# Patient Record
Sex: MALE | Race: WHITE | Employment: OTHER | URBAN - METROPOLITAN AREA
[De-identification: names, ages, dates, MRNs, and addresses within clinical notes are randomized per-mention and may not be internally consistent; named-entity substitution may affect disease eponyms.]

---

## 2017-12-29 ENCOUNTER — LAB ENCOUNTER (OUTPATIENT)
Dept: LAB | Age: 62
End: 2017-12-29
Attending: INTERNAL MEDICINE
Payer: COMMERCIAL

## 2017-12-29 DIAGNOSIS — I25.10 CVD (CARDIOVASCULAR DISEASE): Primary | ICD-10-CM

## 2017-12-29 PROCEDURE — 80061 LIPID PANEL: CPT

## 2017-12-29 PROCEDURE — 36415 COLL VENOUS BLD VENIPUNCTURE: CPT

## 2018-01-05 ENCOUNTER — CHARTING TRANS (OUTPATIENT)
Dept: OTHER | Age: 63
End: 2018-01-05

## 2018-03-16 ENCOUNTER — HOSPITAL ENCOUNTER (OUTPATIENT)
Dept: GENERAL RADIOLOGY | Facility: HOSPITAL | Age: 63
Discharge: HOME | End: 2018-03-16
Attending: FAMILY MEDICINE
Payer: COMMERCIAL

## 2018-03-16 DIAGNOSIS — J20.9 ACUTE BRONCHITIS, UNSPECIFIED: ICD-10-CM

## 2018-03-16 DIAGNOSIS — J20.9 ACUTE BRONCHITIS: ICD-10-CM

## 2018-03-16 PROCEDURE — 71046 X-RAY EXAM CHEST 2 VIEWS: CPT | Performed by: FAMILY MEDICINE

## 2018-04-30 ENCOUNTER — LAB ENCOUNTER (OUTPATIENT)
Dept: LAB | Age: 63
End: 2018-04-30
Attending: INTERNAL MEDICINE
Payer: COMMERCIAL

## 2018-04-30 DIAGNOSIS — I25.10 CORONARY ATHEROSCLEROSIS OF NATIVE CORONARY ARTERY: Primary | ICD-10-CM

## 2018-04-30 PROCEDURE — 80061 LIPID PANEL: CPT

## 2018-04-30 PROCEDURE — 36415 COLL VENOUS BLD VENIPUNCTURE: CPT

## 2018-10-13 ENCOUNTER — HOSPITAL ENCOUNTER (EMERGENCY)
Facility: HOSPITAL | Age: 63
Discharge: HOME | End: 2018-10-13
Attending: EMERGENCY MEDICINE
Payer: COMMERCIAL

## 2018-10-13 VITALS
HEIGHT: 71 IN | DIASTOLIC BLOOD PRESSURE: 80 MMHG | TEMPERATURE: 97 F | HEART RATE: 73 BPM | RESPIRATION RATE: 15 BRPM | BODY MASS INDEX: 29.12 KG/M2 | SYSTOLIC BLOOD PRESSURE: 134 MMHG | OXYGEN SATURATION: 94 % | WEIGHT: 208 LBS

## 2018-10-13 DIAGNOSIS — T78.40XA ALLERGIC REACTION, INITIAL ENCOUNTER: Primary | ICD-10-CM

## 2018-10-13 PROCEDURE — 96372 THER/PROPH/DIAG INJ SC/IM: CPT

## 2018-10-13 PROCEDURE — 99284 EMERGENCY DEPT VISIT MOD MDM: CPT

## 2018-10-13 RX ORDER — EPINEPHRINE 0.3 MG/.3ML
0.3 INJECTION SUBCUTANEOUS
Qty: 1 EACH | Refills: 0 | Status: SHIPPED | OUTPATIENT
Start: 2018-10-13 | End: 2018-11-12

## 2018-10-13 RX ORDER — DEXAMETHASONE SODIUM PHOSPHATE 4 MG/ML
10 VIAL (ML) INJECTION ONCE
Status: COMPLETED | OUTPATIENT
Start: 2018-10-13 | End: 2018-10-13

## 2018-10-13 RX ORDER — DIPHENHYDRAMINE HYDROCHLORIDE 50 MG/ML
25 INJECTION INTRAMUSCULAR; INTRAVENOUS ONCE
Status: COMPLETED | OUTPATIENT
Start: 2018-10-13 | End: 2018-10-13

## 2018-10-13 RX ORDER — ONDANSETRON 4 MG/1
4 TABLET, ORALLY DISINTEGRATING ORAL ONCE
Status: COMPLETED | OUTPATIENT
Start: 2018-10-13 | End: 2018-10-13

## 2018-10-13 RX ORDER — DIPHENHYDRAMINE HCL 25 MG
25 TABLET ORAL EVERY 6 HOURS PRN
Qty: 20 TABLET | Refills: 0 | Status: SHIPPED | OUTPATIENT
Start: 2018-10-13

## 2018-10-13 RX ORDER — PREDNISONE 50 MG/1
50 TABLET ORAL DAILY
Qty: 3 TABLET | Refills: 0 | Status: SHIPPED | OUTPATIENT
Start: 2018-10-13 | End: 2018-10-16

## 2018-10-14 NOTE — ED INITIAL ASSESSMENT (HPI)
Pt allergic to shrimp, had ceviche tonight. Wife tried to give benedryl but patient vomited it up. No wheezing, no SOB, no lip or tongue swelling. Pt states he feels dizzy.

## 2018-10-14 NOTE — ED PROVIDER NOTES
Patient Seen in: [facility] Emergency Department    History   Patient presents with:   Allergic Rxn Allergies (immune)    Stated Complaint:     HPI    66year old male with a history of shrimp allergy complains that he accidentally ingested shrimp th Current:/84   Pulse 76   Temp 97.2 °F (36.2 °C) (Temporal)   Resp 16   Ht 180.3 cm (5' 11\")   Wt 94.3 kg   SpO2 96%   BMI 29.01 kg/m²         Physical Exam   Constitutional: He is oriented to person, place, and time. He is cooperative.   Non-to disintegrating tab 4 mg (4 mg Oral Given 10/13/18 2221)         Procedures: None    Re-Evaluation: 1150p-symptoms resolved, no anaphylaxis developed     10/13/18  2137 10/13/18  2223   BP: 144/73 138/84   Pulse: 82 76   Resp: 18 16   Temp: 97.2 °F (36.2 °C precautions, return instructions, and follow up information were provided. Condition stable upon discharge.  We also recommended that the patient schedule follow up care with a primary care provider as soon as possible to obtain basic health screening inclu

## 2019-09-07 ENCOUNTER — HOSPITAL ENCOUNTER (OUTPATIENT)
Dept: GENERAL RADIOLOGY | Facility: HOSPITAL | Age: 64
Discharge: HOME | End: 2019-09-07
Attending: NURSE PRACTITIONER
Payer: COMMERCIAL

## 2019-09-07 DIAGNOSIS — M54.12 BRACHIAL NEURITIS: ICD-10-CM

## 2019-09-07 PROCEDURE — 72052 X-RAY EXAM NECK SPINE 6/>VWS: CPT | Performed by: NURSE PRACTITIONER

## 2019-09-11 ENCOUNTER — HOSPITAL ENCOUNTER (OUTPATIENT)
Dept: CT IMAGING | Facility: HOSPITAL | Age: 64
Discharge: HOME | End: 2019-09-11
Attending: NURSE PRACTITIONER
Payer: COMMERCIAL

## 2019-09-11 DIAGNOSIS — R51.9 CHRONIC HEADACHES: ICD-10-CM

## 2019-09-11 DIAGNOSIS — G89.29 CHRONIC HEADACHES: ICD-10-CM

## 2019-09-11 PROCEDURE — 70450 CT HEAD/BRAIN W/O DYE: CPT | Performed by: NURSE PRACTITIONER

## 2020-02-22 ENCOUNTER — LAB ENCOUNTER (OUTPATIENT)
Dept: LAB | Facility: HOSPITAL | Age: 65
End: 2020-02-22
Attending: INTERNAL MEDICINE
Payer: COMMERCIAL

## 2020-02-22 DIAGNOSIS — I25.10 CORONARY ATHEROSCLEROSIS OF NATIVE CORONARY ARTERY: Primary | ICD-10-CM

## 2020-02-22 LAB
ANION GAP SERPL CALC-SCNC: 4 MMOL/L (ref 0–18)
BUN BLD-MCNC: 24 MG/DL (ref 7–18)
BUN/CREAT SERPL: 19.4 (ref 10–20)
CALCIUM BLD-MCNC: 9.4 MG/DL (ref 8.5–10.1)
CHLORIDE SERPL-SCNC: 109 MMOL/L (ref 98–112)
CHOLEST SMN-MCNC: 156 MG/DL (ref ?–200)
CO2 SERPL-SCNC: 27 MMOL/L (ref 21–32)
CREAT BLD-MCNC: 1.24 MG/DL (ref 0.7–1.3)
GLUCOSE BLD-MCNC: 299 MG/DL (ref 70–99)
HDLC SERPL-MCNC: 45 MG/DL (ref 40–59)
LDLC SERPL CALC-MCNC: 98 MG/DL (ref ?–100)
NONHDLC SERPL-MCNC: 111 MG/DL (ref ?–130)
OSMOLALITY SERPL CALC.SUM OF ELEC: 305 MOSM/KG (ref 275–295)
PATIENT FASTING Y/N/NP: YES
PATIENT FASTING Y/N/NP: YES
POTASSIUM SERPL-SCNC: 4.3 MMOL/L (ref 3.5–5.1)
SODIUM SERPL-SCNC: 140 MMOL/L (ref 136–145)
TRIGL SERPL-MCNC: 64 MG/DL (ref 30–149)
VLDLC SERPL CALC-MCNC: 13 MG/DL (ref 0–30)

## 2020-02-22 PROCEDURE — 80048 BASIC METABOLIC PNL TOTAL CA: CPT

## 2020-02-22 PROCEDURE — 36415 COLL VENOUS BLD VENIPUNCTURE: CPT

## 2020-02-22 PROCEDURE — 80061 LIPID PANEL: CPT

## 2020-03-16 ENCOUNTER — APPOINTMENT (OUTPATIENT)
Dept: GENERAL RADIOLOGY | Facility: HOSPITAL | Age: 65
End: 2020-03-16
Attending: PHYSICIAN ASSISTANT
Payer: COMMERCIAL

## 2020-03-16 ENCOUNTER — HOSPITAL ENCOUNTER (EMERGENCY)
Facility: HOSPITAL | Age: 65
Discharge: HOME | End: 2020-03-16
Attending: PHYSICIAN ASSISTANT
Payer: COMMERCIAL

## 2020-03-16 VITALS
OXYGEN SATURATION: 97 % | HEART RATE: 57 BPM | BODY MASS INDEX: 29.4 KG/M2 | DIASTOLIC BLOOD PRESSURE: 65 MMHG | TEMPERATURE: 98 F | WEIGHT: 210 LBS | HEIGHT: 71 IN | RESPIRATION RATE: 18 BRPM | SYSTOLIC BLOOD PRESSURE: 140 MMHG

## 2020-03-16 DIAGNOSIS — S51.811A FOREARM LACERATION, RIGHT, INITIAL ENCOUNTER: Primary | ICD-10-CM

## 2020-03-16 PROCEDURE — 99284 EMERGENCY DEPT VISIT MOD MDM: CPT

## 2020-03-16 PROCEDURE — 90471 IMMUNIZATION ADMIN: CPT

## 2020-03-16 PROCEDURE — 12001 RPR S/N/AX/GEN/TRNK 2.5CM/<: CPT

## 2020-03-16 PROCEDURE — 73090 X-RAY EXAM OF FOREARM: CPT | Performed by: PHYSICIAN ASSISTANT

## 2020-03-16 RX ORDER — CLINDAMYCIN HYDROCHLORIDE 300 MG/1
300 CAPSULE ORAL 3 TIMES DAILY
Qty: 15 CAPSULE | Refills: 0 | Status: SHIPPED | OUTPATIENT
Start: 2020-03-16 | End: 2020-03-21

## 2020-03-16 NOTE — ED NOTES
Pt states he was working with ceramic tile when he sustained a laceration to the R forearm. Bleeding controlled at this time. Not UTD with Tetanus.

## 2020-03-16 NOTE — ED INITIAL ASSESSMENT (HPI)
Patient presents to ED with c/o of laceration to right forearm. Patient states that a box of tiles fell on his arm. Bleeding controlled. Tetanus not up to date. Denies other injury.

## 2020-03-16 NOTE — ED PROVIDER NOTES
Patient Seen in: [facility] AND [facility] Emergency Department    History   Patient presents with:  Laceration Abrasion    Stated Complaint:     HPI    HPI:     60year old male who presents with chief complaint of right forearm laceration.   Onset 1 hour prior (5' 11\")   Wt 95.3 kg   SpO2 97%   BMI 29.29 kg/m²         Physical Exam      Constitutional: The patient is cooperative. Appears well-developed and well-nourished. No acute distress. Head: Normocephalic/atraumatic. Neck: The neck is supple.   No mening without epinephrine. 5, 4-0 Nylon sutures placed. Skin well-approximated. Bleeding controlled. Laceration repair was simple. Sterile dressing applied. Patient tolerated procedure well without complication.   MDM     Radiology findings: Xr Forearm (2 V

## 2020-07-28 ENCOUNTER — HOSPITAL ENCOUNTER (EMERGENCY)
Facility: HOSPITAL | Age: 65
Discharge: HOME | End: 2020-07-29
Attending: EMERGENCY MEDICINE
Payer: COMMERCIAL

## 2020-07-28 ENCOUNTER — HOSPITAL ENCOUNTER (OUTPATIENT)
Dept: ULTRASOUND IMAGING | Facility: HOSPITAL | Age: 65
Discharge: HOME | End: 2020-07-28
Attending: FAMILY MEDICINE
Payer: COMMERCIAL

## 2020-07-28 DIAGNOSIS — R10.11 ABDOMINAL PAIN, RUQ: ICD-10-CM

## 2020-07-28 DIAGNOSIS — R10.11 ABDOMINAL PAIN, RIGHT UPPER QUADRANT: Primary | ICD-10-CM

## 2020-07-28 LAB
BASOPHILS # BLD AUTO: 0.07 X10(3) UL (ref 0–0.2)
BASOPHILS NFR BLD AUTO: 0.9 %
DEPRECATED RDW RBC AUTO: 39.1 FL (ref 35.1–46.3)
EOSINOPHIL # BLD AUTO: 0.23 X10(3) UL (ref 0–0.7)
EOSINOPHIL NFR BLD AUTO: 3.1 %
ERYTHROCYTE [DISTWIDTH] IN BLOOD BY AUTOMATED COUNT: 13 % (ref 11–15)
HCT VFR BLD AUTO: 37.8 % (ref 39–53)
HGB BLD-MCNC: 13 G/DL (ref 13–17.5)
IMM GRANULOCYTES # BLD AUTO: 0.02 X10(3) UL (ref 0–1)
IMM GRANULOCYTES NFR BLD: 0.3 %
LYMPHOCYTES # BLD AUTO: 1.87 X10(3) UL (ref 1–4)
LYMPHOCYTES NFR BLD AUTO: 24.8 %
MCH RBC QN AUTO: 28.3 PG (ref 26–34)
MCHC RBC AUTO-ENTMCNC: 34.4 G/DL (ref 31–37)
MCV RBC AUTO: 82.4 FL (ref 80–100)
MONOCYTES # BLD AUTO: 0.63 X10(3) UL (ref 0.1–1)
MONOCYTES NFR BLD AUTO: 8.4 %
NEUTROPHILS # BLD AUTO: 4.71 X10 (3) UL (ref 1.5–7.7)
NEUTROPHILS # BLD AUTO: 4.71 X10(3) UL (ref 1.5–7.7)
NEUTROPHILS NFR BLD AUTO: 62.5 %
PLATELET # BLD AUTO: 198 10(3)UL (ref 150–450)
RBC # BLD AUTO: 4.59 X10(6)UL (ref 4.3–5.7)
WBC # BLD AUTO: 7.5 X10(3) UL (ref 4–11)

## 2020-07-28 PROCEDURE — 96374 THER/PROPH/DIAG INJ IV PUSH: CPT

## 2020-07-28 PROCEDURE — 96375 TX/PRO/DX INJ NEW DRUG ADDON: CPT

## 2020-07-28 PROCEDURE — 99284 EMERGENCY DEPT VISIT MOD MDM: CPT

## 2020-07-28 PROCEDURE — 80076 HEPATIC FUNCTION PANEL: CPT | Performed by: EMERGENCY MEDICINE

## 2020-07-28 PROCEDURE — 76705 ECHO EXAM OF ABDOMEN: CPT | Performed by: FAMILY MEDICINE

## 2020-07-28 PROCEDURE — S0028 INJECTION, FAMOTIDINE, 20 MG: HCPCS | Performed by: EMERGENCY MEDICINE

## 2020-07-28 PROCEDURE — 85025 COMPLETE CBC W/AUTO DIFF WBC: CPT | Performed by: EMERGENCY MEDICINE

## 2020-07-28 PROCEDURE — 83690 ASSAY OF LIPASE: CPT | Performed by: EMERGENCY MEDICINE

## 2020-07-28 PROCEDURE — 80048 BASIC METABOLIC PNL TOTAL CA: CPT | Performed by: EMERGENCY MEDICINE

## 2020-07-28 RX ORDER — FAMOTIDINE 10 MG/ML
20 INJECTION, SOLUTION INTRAVENOUS ONCE
Status: COMPLETED | OUTPATIENT
Start: 2020-07-28 | End: 2020-07-28

## 2020-07-28 RX ORDER — MORPHINE SULFATE 4 MG/ML
4 INJECTION, SOLUTION INTRAMUSCULAR; INTRAVENOUS ONCE
Status: COMPLETED | OUTPATIENT
Start: 2020-07-28 | End: 2020-07-28

## 2020-07-29 ENCOUNTER — APPOINTMENT (OUTPATIENT)
Dept: CT IMAGING | Facility: HOSPITAL | Age: 65
End: 2020-07-29
Attending: EMERGENCY MEDICINE
Payer: COMMERCIAL

## 2020-07-29 VITALS
BODY MASS INDEX: 29.12 KG/M2 | DIASTOLIC BLOOD PRESSURE: 82 MMHG | HEART RATE: 51 BPM | HEIGHT: 71 IN | OXYGEN SATURATION: 98 % | SYSTOLIC BLOOD PRESSURE: 174 MMHG | RESPIRATION RATE: 20 BRPM | WEIGHT: 208 LBS | TEMPERATURE: 99 F

## 2020-07-29 LAB
ALBUMIN SERPL-MCNC: 3.5 G/DL (ref 3.4–5)
ALP LIVER SERPL-CCNC: 47 U/L (ref 45–117)
ALT SERPL-CCNC: 28 U/L (ref 16–61)
ANION GAP SERPL CALC-SCNC: 6 MMOL/L (ref 0–18)
AST SERPL-CCNC: 11 U/L (ref 15–37)
BILIRUB DIRECT SERPL-MCNC: <0.1 MG/DL (ref 0–0.2)
BILIRUB SERPL-MCNC: 0.3 MG/DL (ref 0.1–2)
BUN BLD-MCNC: 26 MG/DL (ref 7–18)
BUN/CREAT SERPL: 17.8 (ref 10–20)
CALCIUM BLD-MCNC: 9.6 MG/DL (ref 8.5–10.1)
CHLORIDE SERPL-SCNC: 105 MMOL/L (ref 98–112)
CO2 SERPL-SCNC: 27 MMOL/L (ref 21–32)
CREAT BLD-MCNC: 1.46 MG/DL (ref 0.7–1.3)
GLUCOSE BLD-MCNC: 353 MG/DL (ref 70–99)
LIPASE SERPL-CCNC: 217 U/L (ref 73–393)
M PROTEIN MFR SERPL ELPH: 7 G/DL (ref 6.4–8.2)
OSMOLALITY SERPL CALC.SUM OF ELEC: 305 MOSM/KG (ref 275–295)
POTASSIUM SERPL-SCNC: 4.3 MMOL/L (ref 3.5–5.1)
SODIUM SERPL-SCNC: 138 MMOL/L (ref 136–145)

## 2020-07-29 PROCEDURE — 74177 CT ABD & PELVIS W/CONTRAST: CPT | Performed by: EMERGENCY MEDICINE

## 2020-07-29 RX ORDER — HYDROCODONE BITARTRATE AND ACETAMINOPHEN 5; 325 MG/1; MG/1
1 TABLET ORAL EVERY 6 HOURS PRN
Qty: 10 TABLET | Refills: 0 | Status: SHIPPED | OUTPATIENT
Start: 2020-07-29

## 2020-07-29 RX ORDER — FAMOTIDINE 20 MG
20 TABLET ORAL 2 TIMES DAILY
Qty: 60 TABLET | Refills: 0 | Status: SHIPPED | OUTPATIENT
Start: 2020-07-29 | End: 2020-08-03

## 2020-07-29 NOTE — ED INITIAL ASSESSMENT (HPI)
Pt c/o RUQ abdominal pain since last Thursday. US of gallbladder done today. Pt unaware of the results. Pt states pain medications are not helping and was instructed PCP to come to ED.

## 2020-07-29 NOTE — ED NOTES
Pt states has been having RUQ abd pain, rt shoulder blade pain and belching since last week. States had an US yesterday. States was having diarrhea on Sunday but no BM since then. Denies n/v, fevers, chest pain or SOB. No tenderness on palpation.  States it

## 2020-07-29 NOTE — ED PROVIDER NOTES
Patient Seen in: [facility] Emergency Department    History   Patient presents with:  Abdomen/Flank Pain      HPI    Patient presents to the ED complaining of right upper quadrant abdominal pain starting 5 days ago.   He states that pain was initia duration of the exam.  Handwashing was performed prior to and after the exam.  Stethoscope and any equipment used during my examination was cleaned with super sani-cloth germicidal wipes following the exam.     Physical Exam   Constitutional: He is oriente -----------         ------                     CBC W/ DIFFERENTIAL[[id number]]          Abnormal            Final result                 Please view results for these tests on the individual orders.          Imaging Results Available and 94.3 kg    Height: 180.3 cm (5' 11\")      *I personally reviewed and interpreted all ED vitals.     Pulse Ox: 98%, Room air, Normal     Monitor Interpretation:   normal sinus rhythm    Differential Diagnosis/ Diagnostic Considerations: Nonspecific abdomina

## 2021-03-04 ENCOUNTER — HOSPITAL ENCOUNTER (OUTPATIENT)
Dept: CT IMAGING | Age: 66
Discharge: HOME | End: 2021-03-04
Attending: OTOLARYNGOLOGY

## 2021-03-04 DIAGNOSIS — J32.9 SINUSITIS: ICD-10-CM

## 2021-03-04 PROCEDURE — 70480 CT ORBIT/EAR/FOSSA W/O DYE: CPT

## 2021-04-16 RX ORDER — IRBESARTAN AND HYDROCHLOROTHIAZIDE 150; 12.5 MG/1; MG/1
1 TABLET, FILM COATED ORAL DAILY
COMMUNITY

## 2021-04-16 RX ORDER — INSULIN ASPART 100 [IU]/ML
INJECTION, SUSPENSION SUBCUTANEOUS 3 TIMES DAILY
COMMUNITY
Start: 2013-05-07

## 2021-04-16 RX ORDER — INSULIN GLARGINE 100 [IU]/ML
INJECTION, SOLUTION SUBCUTANEOUS 2 TIMES DAILY
COMMUNITY

## 2021-04-16 RX ORDER — MELOXICAM 15 MG/1
15 TABLET ORAL DAILY
COMMUNITY

## 2021-04-16 RX ORDER — ESCITALOPRAM OXALATE 10 MG/1
10 TABLET ORAL DAILY
COMMUNITY

## 2021-04-16 RX ORDER — LEVOTHYROXINE SODIUM 0.12 MG/1
125 TABLET ORAL DAILY
COMMUNITY

## 2021-04-16 RX ORDER — OMEPRAZOLE 20 MG/1
20 CAPSULE, DELAYED RELEASE ORAL PRN
COMMUNITY

## 2021-04-16 RX ORDER — FENOFIBRATE 145 MG/1
145 TABLET, COATED ORAL DAILY
COMMUNITY
Start: 2021-02-03

## 2021-04-16 RX ORDER — AMITRIPTYLINE HYDROCHLORIDE 10 MG/1
10 TABLET, FILM COATED ORAL NIGHTLY
COMMUNITY

## 2021-04-16 RX ORDER — CLOTRIMAZOLE AND BETAMETHASONE DIPROPIONATE 10; .64 MG/G; MG/G
1 CREAM TOPICAL PRN
COMMUNITY

## 2021-04-16 RX ORDER — ATORVASTATIN CALCIUM 80 MG/1
80 TABLET, FILM COATED ORAL DAILY
COMMUNITY

## 2021-04-16 ASSESSMENT — ACTIVITIES OF DAILY LIVING (ADL)
HISTORY OF FALLING IN THE LAST YEAR (PRIOR TO ADMISSION): NO
ADL_SCORE: 12
SENSORY_SUPPORT_DEVICES: HEARING AIDS;EYEGLASSES
RECENT_DECLINE_ADL: NO
ADL_SHORT_OF_BREATH: NO
ADL_BEFORE_ADMISSION: INDEPENDENT
NEEDS_ASSIST: NO

## 2021-04-16 ASSESSMENT — COGNITIVE AND FUNCTIONAL STATUS - GENERAL: ARE YOU DEAF OR DO YOU HAVE SERIOUS DIFFICULTY  HEARING: YES

## 2021-04-17 ENCOUNTER — LAB SERVICES (OUTPATIENT)
Dept: LAB | Age: 66
End: 2021-04-17

## 2021-04-17 DIAGNOSIS — Z01.812 PRE-PROCEDURAL LABORATORY EXAMINATION: Primary | ICD-10-CM

## 2021-04-17 LAB
SARS-COV-2 RNA RESP QL NAA+PROBE: NOT DETECTED
SERVICE CMNT-IMP: NORMAL
SERVICE CMNT-IMP: NORMAL

## 2021-04-17 PROCEDURE — U0005 INFEC AGEN DETEC AMPLI PROBE: HCPCS | Performed by: OTOLARYNGOLOGY

## 2021-04-17 PROCEDURE — U0003 INFECTIOUS AGENT DETECTION BY NUCLEIC ACID (DNA OR RNA); SEVERE ACUTE RESPIRATORY SYNDROME CORONAVIRUS 2 (SARS-COV-2) (CORONAVIRUS DISEASE [COVID-19]), AMPLIFIED PROBE TECHNIQUE, MAKING USE OF HIGH THROUGHPUT TECHNOLOGIES AS DESCRIBED BY CMS-2020-01-R: HCPCS | Performed by: OTOLARYNGOLOGY

## 2021-04-19 ENCOUNTER — ANESTHESIA EVENT (OUTPATIENT)
Dept: SURGERY | Age: 66
End: 2021-04-19

## 2021-04-19 ENCOUNTER — HOSPITAL ENCOUNTER (OUTPATIENT)
Age: 66
Discharge: HOME | End: 2021-04-19
Attending: OTOLARYNGOLOGY | Admitting: OTOLARYNGOLOGY

## 2021-04-19 ENCOUNTER — ANESTHESIA (OUTPATIENT)
Dept: SURGERY | Age: 66
End: 2021-04-19

## 2021-04-19 LAB
GLUCOSE BLDC GLUCOMTR-MCNC: 162 MG/DL (ref 70–99)
GLUCOSE BLDC GLUCOMTR-MCNC: 184 MG/DL (ref 70–99)
GLUCOSE BLDC GLUCOMTR-MCNC: 239 MG/DL (ref 70–99)

## 2021-04-19 PROCEDURE — L8690 AUD OSSEO DEV, INT/EXT COMP: HCPCS | Performed by: OTOLARYNGOLOGY

## 2021-04-19 PROCEDURE — 10002800 HB RX 250 W HCPCS: Performed by: ANESTHESIOLOGY

## 2021-04-19 PROCEDURE — 10002805 HB CONTRAST AGENT: Performed by: OTOLARYNGOLOGY

## 2021-04-19 PROCEDURE — 10002801 HB RX 250 W/O HCPCS: Performed by: ANESTHESIOLOGY

## 2021-04-19 PROCEDURE — 13000002 HB ANESTHESIA  GENERAL  S/U + 1ST 15 MIN: Performed by: OTOLARYNGOLOGY

## 2021-04-19 PROCEDURE — 10006027 HB SUPPLY 278: Performed by: OTOLARYNGOLOGY

## 2021-04-19 PROCEDURE — 10002801 HB RX 250 W/O HCPCS: Performed by: OTOLARYNGOLOGY

## 2021-04-19 PROCEDURE — 10002807 HB RX 258: Performed by: ANESTHESIOLOGY

## 2021-04-19 PROCEDURE — 10004452 HB PACU ADDL 30 MINUTES: Performed by: OTOLARYNGOLOGY

## 2021-04-19 PROCEDURE — 13000001 HB PHASE II RECOVERY EA 30 MINUTES: Performed by: OTOLARYNGOLOGY

## 2021-04-19 PROCEDURE — 82962 GLUCOSE BLOOD TEST: CPT

## 2021-04-19 PROCEDURE — 13000003 HB ANESTHESIA  GENERAL EA ADD MINUTE: Performed by: OTOLARYNGOLOGY

## 2021-04-19 PROCEDURE — 13000036 HB COMPLEX  CASE S/U + 1ST 15 MIN: Performed by: OTOLARYNGOLOGY

## 2021-04-19 PROCEDURE — 13000037 HB COMPLEX CASE EACH ADD MINUTE: Performed by: OTOLARYNGOLOGY

## 2021-04-19 PROCEDURE — 10006023 HB SUPPLY 272: Performed by: OTOLARYNGOLOGY

## 2021-04-19 PROCEDURE — 10004451 HB PACU RECOVERY 1ST 30 MINUTES: Performed by: OTOLARYNGOLOGY

## 2021-04-19 PROCEDURE — 10002803 HB RX 637: Performed by: OTOLARYNGOLOGY

## 2021-04-19 DEVICE — IMPLANTABLE DEVICE: Type: IMPLANTABLE DEVICE | Site: HEAD | Status: FUNCTIONAL

## 2021-04-19 DEVICE — IMPLANTABLE DEVICE: Type: IMPLANTABLE DEVICE | Site: EAR | Status: FUNCTIONAL

## 2021-04-19 RX ORDER — SODIUM CHLORIDE, SODIUM LACTATE, POTASSIUM CHLORIDE, CALCIUM CHLORIDE 600; 310; 30; 20 MG/100ML; MG/100ML; MG/100ML; MG/100ML
INJECTION, SOLUTION INTRAVENOUS CONTINUOUS PRN
Status: DISCONTINUED | OUTPATIENT
Start: 2021-04-19 | End: 2021-04-20 | Stop reason: HOSPADM

## 2021-04-19 RX ORDER — LIDOCAINE HYDROCHLORIDE 10 MG/ML
INJECTION, SOLUTION INFILTRATION; PERINEURAL PRN
Status: DISCONTINUED | OUTPATIENT
Start: 2021-04-19 | End: 2021-04-19

## 2021-04-19 RX ORDER — LIDOCAINE HYDROCHLORIDE AND EPINEPHRINE 10; 10 MG/ML; UG/ML
INJECTION, SOLUTION INFILTRATION; PERINEURAL PRN
Status: DISCONTINUED | OUTPATIENT
Start: 2021-04-19 | End: 2021-04-19 | Stop reason: HOSPADM

## 2021-04-19 RX ORDER — EPHEDRINE SULFATE/0.9% NACL/PF 50 MG/10ML
SYRINGE (ML) INTRAVENOUS PRN
Status: DISCONTINUED | OUTPATIENT
Start: 2021-04-19 | End: 2021-04-19

## 2021-04-19 RX ORDER — MAGNESIUM HYDROXIDE 1200 MG/15ML
LIQUID ORAL PRN
Status: DISCONTINUED | OUTPATIENT
Start: 2021-04-19 | End: 2021-04-19 | Stop reason: HOSPADM

## 2021-04-19 RX ORDER — SODIUM CHLORIDE, SODIUM LACTATE, POTASSIUM CHLORIDE, CALCIUM CHLORIDE 600; 310; 30; 20 MG/100ML; MG/100ML; MG/100ML; MG/100ML
INJECTION, SOLUTION INTRAVENOUS CONTINUOUS PRN
Status: DISCONTINUED | OUTPATIENT
Start: 2021-04-19 | End: 2021-04-19

## 2021-04-19 RX ORDER — CLINDAMYCIN PHOSPHATE 900 MG/50ML
INJECTION INTRAVENOUS PRN
Status: DISCONTINUED | OUTPATIENT
Start: 2021-04-19 | End: 2021-04-19

## 2021-04-19 RX ORDER — DEXTROSE AND SODIUM CHLORIDE 5; .45 G/100ML; G/100ML
INJECTION, SOLUTION INTRAVENOUS CONTINUOUS PRN
Status: DISCONTINUED | OUTPATIENT
Start: 2021-04-19 | End: 2021-04-20 | Stop reason: HOSPADM

## 2021-04-19 RX ORDER — ONDANSETRON 2 MG/ML
INJECTION INTRAMUSCULAR; INTRAVENOUS PRN
Status: DISCONTINUED | OUTPATIENT
Start: 2021-04-19 | End: 2021-04-19

## 2021-04-19 RX ORDER — SODIUM CHLORIDE, SODIUM LACTATE, POTASSIUM CHLORIDE, CALCIUM CHLORIDE 600; 310; 30; 20 MG/100ML; MG/100ML; MG/100ML; MG/100ML
INJECTION, SOLUTION INTRAVENOUS CONTINUOUS
Status: DISCONTINUED | OUTPATIENT
Start: 2021-04-19 | End: 2021-04-20 | Stop reason: HOSPADM

## 2021-04-19 RX ORDER — HYDROCODONE BITARTRATE AND ACETAMINOPHEN 5; 325 MG/1; MG/1
1 TABLET ORAL EVERY 4 HOURS PRN
Status: DISCONTINUED | OUTPATIENT
Start: 2021-04-19 | End: 2021-04-20 | Stop reason: HOSPADM

## 2021-04-19 RX ORDER — MIDAZOLAM HYDROCHLORIDE 1 MG/ML
2 INJECTION, SOLUTION INTRAMUSCULAR; INTRAVENOUS
Status: COMPLETED | OUTPATIENT
Start: 2021-04-19 | End: 2021-04-19

## 2021-04-19 RX ORDER — PROPOFOL 10 MG/ML
INJECTION, EMULSION INTRAVENOUS PRN
Status: DISCONTINUED | OUTPATIENT
Start: 2021-04-19 | End: 2021-04-19

## 2021-04-19 RX ORDER — ONDANSETRON 2 MG/ML
4 INJECTION INTRAMUSCULAR; INTRAVENOUS ONCE
Status: DISCONTINUED | OUTPATIENT
Start: 2021-04-19 | End: 2021-04-20 | Stop reason: HOSPADM

## 2021-04-19 RX ORDER — DEXTROSE MONOHYDRATE 25 G/50ML
25 INJECTION, SOLUTION INTRAVENOUS PRN
Status: DISCONTINUED | OUTPATIENT
Start: 2021-04-19 | End: 2021-04-20 | Stop reason: HOSPADM

## 2021-04-19 RX ADMIN — PROPOFOL 160 MG: 10 INJECTION, EMULSION INTRAVENOUS at 12:30

## 2021-04-19 RX ADMIN — MIDAZOLAM HYDROCHLORIDE 2 MG: 1 INJECTION, SOLUTION INTRAMUSCULAR; INTRAVENOUS at 12:21

## 2021-04-19 RX ADMIN — CLINDAMYCIN IN 5 PERCENT DEXTROSE 900 MG: 18 INJECTION, SOLUTION INTRAVENOUS at 12:36

## 2021-04-19 RX ADMIN — LIDOCAINE HYDROCHLORIDE 50 MG: 10 INJECTION, SOLUTION INFILTRATION; PERINEURAL at 12:30

## 2021-04-19 RX ADMIN — FENTANYL CITRATE 25 MCG: 50 INJECTION INTRAMUSCULAR; INTRAVENOUS at 14:08

## 2021-04-19 RX ADMIN — SODIUM CHLORIDE, POTASSIUM CHLORIDE, SODIUM LACTATE AND CALCIUM CHLORIDE: 600; 310; 30; 20 INJECTION, SOLUTION INTRAVENOUS at 12:25

## 2021-04-19 RX ADMIN — INSULIN HUMAN 5 UNITS: 100 INJECTION, SOLUTION PARENTERAL at 11:33

## 2021-04-19 RX ADMIN — HYDROCODONE BITARTRATE AND ACETAMINOPHEN 1 TABLET: 5; 325 TABLET ORAL at 15:31

## 2021-04-19 RX ADMIN — SODIUM CHLORIDE, POTASSIUM CHLORIDE, SODIUM LACTATE AND CALCIUM CHLORIDE: 600; 310; 30; 20 INJECTION, SOLUTION INTRAVENOUS at 11:34

## 2021-04-19 RX ADMIN — ONDANSETRON 4 MG: 2 INJECTION INTRAMUSCULAR; INTRAVENOUS at 13:12

## 2021-04-19 RX ADMIN — EPHEDRINE SULFATE 10 MG: 5 INJECTION INTRAVENOUS at 12:52

## 2021-04-19 SDOH — SOCIAL STABILITY: SOCIAL INSECURITY: RISK FACTORS: BMI> 30 (OBESITY)

## 2021-04-19 ASSESSMENT — PAIN SCALES - GENERAL
PAINLEVEL_OUTOF10: 3
PAINLEVEL_OUTOF10: 0
PAINLEVEL_OUTOF10: 0
PAINLEVEL_OUTOF10: 4
PAINLEVEL_OUTOF10: 3
PAINLEVEL_OUTOF10: 3
PAINLEVEL_OUTOF10: 5
PAINLEVEL_OUTOF10: 4
PAINLEVEL_OUTOF10: 0
PAINLEVEL_OUTOF10: 6

## 2021-04-19 ASSESSMENT — PAIN SCALES - WONG BAKER
WONGBAKER_NUMERICALRESPONSE: 0

## 2021-05-05 ENCOUNTER — HOSPITAL ENCOUNTER (EMERGENCY)
Facility: HOSPITAL | Age: 66
Discharge: HOME | End: 2021-05-05
Attending: EMERGENCY MEDICINE
Payer: COMMERCIAL

## 2021-05-05 VITALS
WEIGHT: 216 LBS | RESPIRATION RATE: 16 BRPM | SYSTOLIC BLOOD PRESSURE: 143 MMHG | BODY MASS INDEX: 30 KG/M2 | DIASTOLIC BLOOD PRESSURE: 72 MMHG | TEMPERATURE: 97 F | HEART RATE: 64 BPM | OXYGEN SATURATION: 97 %

## 2021-05-05 DIAGNOSIS — U07.1 COVID-19: Primary | ICD-10-CM

## 2021-05-05 PROCEDURE — 99284 EMERGENCY DEPT VISIT MOD MDM: CPT

## 2021-05-05 NOTE — ED PROVIDER NOTES
Patient Seen in: [facility] AND [facility] Emergency Department      History   Patient presents with:  Covid    Stated Complaint: covid+    HPI/Subjective:   HPI    80-year-old male presents for evaluation of fatigue.   Patient reports he tested positive for Covi distress. Breath sounds: Normal breath sounds. Abdominal:      General: Bowel sounds are normal.      Palpations: Abdomen is soft. Tenderness: There is no abdominal tenderness. Musculoskeletal:         General: Normal range of motion.       Ce personal items, clean and disinfect “high touch” surfaces, and frequent handwashing) according to CDC guidelines.                  Disposition and Plan     Clinical Impression:  [id number]  (primary encounter diagnosis)     Disposition:  Discharge  5/5/2021

## 2021-05-05 NOTE — ED QUICK NOTES
Pt c/o fatigue, \"sleeping all day\", difficulty eating d/t altered taste, nausea w/o vomiting while eating, and numbness/tingling to bilat hands, worse to R per pt. Denies any s/s to BLE. No facial droop, no slurring of speech, no localized weakness.    Pt

## 2021-05-25 VITALS
WEIGHT: 218.03 LBS | OXYGEN SATURATION: 99 % | RESPIRATION RATE: 17 BRPM | BODY MASS INDEX: 30.52 KG/M2 | SYSTOLIC BLOOD PRESSURE: 142 MMHG | TEMPERATURE: 97.2 F | HEART RATE: 62 BPM | DIASTOLIC BLOOD PRESSURE: 70 MMHG | HEIGHT: 71 IN

## 2021-07-22 ENCOUNTER — LAB REQUISITION (OUTPATIENT)
Dept: LAB | Age: 66
End: 2021-07-22

## 2021-07-22 DIAGNOSIS — L02.11 CUTANEOUS ABSCESS OF NECK: ICD-10-CM

## 2021-07-22 PROCEDURE — 87205 SMEAR GRAM STAIN: CPT | Performed by: CLINICAL MEDICAL LABORATORY

## 2021-07-22 PROCEDURE — 87186 SC STD MICRODIL/AGAR DIL: CPT | Performed by: CLINICAL MEDICAL LABORATORY

## 2021-07-22 PROCEDURE — 87077 CULTURE AEROBIC IDENTIFY: CPT | Performed by: CLINICAL MEDICAL LABORATORY

## 2021-07-22 PROCEDURE — 87070 CULTURE OTHR SPECIMN AEROBIC: CPT | Performed by: CLINICAL MEDICAL LABORATORY

## 2021-07-28 LAB
BACTERIA SPEC AEROBE CULT: ABNORMAL
BACTERIA SPEC AEROBE CULT: ABNORMAL
GRAM STN SPEC: ABNORMAL

## 2022-04-01 ENCOUNTER — DOCUMENTATION (OUTPATIENT)
Dept: SURGERY | Age: 67
End: 2022-04-01

## 2022-07-07 NOTE — ED AVS SNAPSHOT
----- Message from Zania Bush sent at 7/7/2022  2:43 PM CDT -----  Contact: pt [phone]  SB  is requesting a hosp follow up within 7 days from today, pls call patient to schedule.     Jen Connor   MRN: [id number]    Department:  [facility] Emergency Department   Date of Visit:  3/16/2020           Disclosure     Insurance plans vary and the physician(s) referred by the ER may not be covered by your plan.  Please contact you CARE PHYSICIAN AT ONCE OR RETURN IMMEDIATELY TO THE EMERGENCY DEPARTMENT. If you have been prescribed any medication(s), please fill your prescription right away and begin taking the medication(s) as directed.   If you believe that any of the medications
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
## 2022-12-20 ENCOUNTER — CLINICAL DOCUMENTATION (OUTPATIENT)
Dept: SURGERY | Age: 67
End: 2022-12-20

## 2023-04-18 ENCOUNTER — OFFICE VISIT (OUTPATIENT)
Dept: INTERNAL MEDICINE CLINIC | Facility: CLINIC | Age: 68
End: 2023-04-18

## 2023-04-18 VITALS
HEIGHT: 71 IN | HEART RATE: 75 BPM | RESPIRATION RATE: 18 BRPM | WEIGHT: 214 LBS | BODY MASS INDEX: 29.96 KG/M2 | DIASTOLIC BLOOD PRESSURE: 68 MMHG | TEMPERATURE: 98 F | SYSTOLIC BLOOD PRESSURE: 132 MMHG | OXYGEN SATURATION: 98 %

## 2023-04-18 DIAGNOSIS — E11.9 TYPE 2 DIABETES MELLITUS WITHOUT COMPLICATION, WITH LONG-TERM CURRENT USE OF INSULIN (HCC): ICD-10-CM

## 2023-04-18 DIAGNOSIS — Z00.00 PREVENTATIVE HEALTH CARE: ICD-10-CM

## 2023-04-18 DIAGNOSIS — G89.29 CHRONIC NONINTRACTABLE HEADACHE, UNSPECIFIED HEADACHE TYPE: ICD-10-CM

## 2023-04-18 DIAGNOSIS — I25.10 CORONARY ARTERY DISEASE INVOLVING NATIVE CORONARY ARTERY OF NATIVE HEART WITHOUT ANGINA PECTORIS: ICD-10-CM

## 2023-04-18 DIAGNOSIS — Z79.4 TYPE 2 DIABETES MELLITUS WITHOUT COMPLICATION, WITH LONG-TERM CURRENT USE OF INSULIN (HCC): ICD-10-CM

## 2023-04-18 DIAGNOSIS — R00.2 PALPITATIONS: ICD-10-CM

## 2023-04-18 DIAGNOSIS — R51.9 CHRONIC NONINTRACTABLE HEADACHE, UNSPECIFIED HEADACHE TYPE: ICD-10-CM

## 2023-04-18 DIAGNOSIS — F41.9 ANXIETY: ICD-10-CM

## 2023-04-18 DIAGNOSIS — Z76.89 ENCOUNTER TO ESTABLISH CARE: Primary | ICD-10-CM

## 2023-04-18 DIAGNOSIS — R42 DIZZY SPELLS: ICD-10-CM

## 2023-04-18 LAB
ALBUMIN SERPL-MCNC: 3.4 G/DL (ref 3.4–5)
ALBUMIN/GLOB SERPL: 1.1 {RATIO} (ref 1–2)
ALP LIVER SERPL-CCNC: 46 U/L
ALT SERPL-CCNC: 53 U/L
ANION GAP SERPL CALC-SCNC: 8 MMOL/L (ref 0–18)
AST SERPL-CCNC: 30 U/L (ref 15–37)
BASOPHILS # BLD AUTO: 0.06 X10(3) UL (ref 0–0.2)
BASOPHILS NFR BLD AUTO: 0.7 %
BILIRUB SERPL-MCNC: 0.2 MG/DL (ref 0.1–2)
BUN BLD-MCNC: 30 MG/DL (ref 7–18)
BUN/CREAT SERPL: 24.4 (ref 10–20)
CALCIUM BLD-MCNC: 9 MG/DL (ref 8.5–10.1)
CHLORIDE SERPL-SCNC: 107 MMOL/L (ref 98–112)
CO2 SERPL-SCNC: 23 MMOL/L (ref 21–32)
CREAT BLD-MCNC: 1.23 MG/DL
DEPRECATED RDW RBC AUTO: 40.2 FL (ref 35.1–46.3)
EOSINOPHIL # BLD AUTO: 0.28 X10(3) UL (ref 0–0.7)
EOSINOPHIL NFR BLD AUTO: 3.3 %
ERYTHROCYTE [DISTWIDTH] IN BLOOD BY AUTOMATED COUNT: 13 % (ref 11–15)
FASTING STATUS PATIENT QL REPORTED: YES
GFR SERPLBLD BASED ON 1.73 SQ M-ARVRAT: 65 ML/MIN/1.73M2 (ref 60–?)
GLOBULIN PLAS-MCNC: 3 G/DL (ref 2.8–4.4)
GLUCOSE BLD-MCNC: 347 MG/DL (ref 70–99)
HCT VFR BLD AUTO: 36.7 %
HGB BLD-MCNC: 12.2 G/DL
IMM GRANULOCYTES # BLD AUTO: 0.03 X10(3) UL (ref 0–1)
IMM GRANULOCYTES NFR BLD: 0.4 %
LYMPHOCYTES # BLD AUTO: 1.71 X10(3) UL (ref 1–4)
LYMPHOCYTES NFR BLD AUTO: 20 %
MCH RBC QN AUTO: 28.6 PG (ref 26–34)
MCHC RBC AUTO-ENTMCNC: 33.2 G/DL (ref 31–37)
MCV RBC AUTO: 85.9 FL
MONOCYTES # BLD AUTO: 0.65 X10(3) UL (ref 0.1–1)
MONOCYTES NFR BLD AUTO: 7.6 %
NEUTROPHILS # BLD AUTO: 5.82 X10 (3) UL (ref 1.5–7.7)
NEUTROPHILS # BLD AUTO: 5.82 X10(3) UL (ref 1.5–7.7)
NEUTROPHILS NFR BLD AUTO: 68 %
OSMOLALITY SERPL CALC.SUM OF ELEC: 306 MOSM/KG (ref 275–295)
PLATELET # BLD AUTO: 182 10(3)UL (ref 150–450)
POTASSIUM SERPL-SCNC: 5.3 MMOL/L (ref 3.5–5.1)
PROT SERPL-MCNC: 6.4 G/DL (ref 6.4–8.2)
RBC # BLD AUTO: 4.27 X10(6)UL
SODIUM SERPL-SCNC: 138 MMOL/L (ref 136–145)
WBC # BLD AUTO: 8.6 X10(3) UL (ref 4–11)

## 2023-04-18 PROCEDURE — 99204 OFFICE O/P NEW MOD 45 MIN: CPT | Performed by: INTERNAL MEDICINE

## 2023-04-18 PROCEDURE — 90677 PCV20 VACCINE IM: CPT | Performed by: INTERNAL MEDICINE

## 2023-04-18 PROCEDURE — G0009 ADMIN PNEUMOCOCCAL VACCINE: HCPCS | Performed by: INTERNAL MEDICINE

## 2023-04-18 PROCEDURE — 36415 COLL VENOUS BLD VENIPUNCTURE: CPT | Performed by: INTERNAL MEDICINE

## 2023-04-18 RX ORDER — FUROSEMIDE 40 MG/1
40 TABLET ORAL 2 TIMES DAILY
COMMUNITY

## 2023-04-18 RX ORDER — LOSARTAN POTASSIUM 25 MG/1
1 TABLET ORAL DAILY
COMMUNITY

## 2023-04-18 RX ORDER — ROSUVASTATIN CALCIUM 20 MG/1
20 TABLET, COATED ORAL NIGHTLY
COMMUNITY

## 2023-04-18 RX ORDER — LIRAGLUTIDE 6 MG/ML
1.2 INJECTION SUBCUTANEOUS DAILY
COMMUNITY

## 2023-04-18 RX ORDER — ISOSORBIDE MONONITRATE 30 MG/1
30 TABLET, EXTENDED RELEASE ORAL DAILY
COMMUNITY

## 2023-04-18 RX ORDER — ASPIRIN 81 MG/1
81 TABLET ORAL DAILY
COMMUNITY

## 2023-04-18 RX ORDER — CHOLECALCIFEROL (VITAMIN D3) 1250 MCG
1 CAPSULE ORAL WEEKLY
COMMUNITY

## 2023-04-18 RX ORDER — LEVOTHYROXINE SODIUM 0.12 MG/1
125 TABLET ORAL
COMMUNITY

## 2023-04-18 RX ORDER — AMLODIPINE BESYLATE 10 MG/1
10 TABLET ORAL DAILY
COMMUNITY

## 2023-04-18 RX ORDER — INSULIN GLARGINE 100 [IU]/ML
60 INJECTION, SOLUTION SUBCUTANEOUS NIGHTLY
COMMUNITY

## 2023-04-18 RX ORDER — EZETIMIBE 10 MG/1
10 TABLET ORAL NIGHTLY
COMMUNITY

## 2023-04-19 LAB
EST. AVERAGE GLUCOSE BLD GHB EST-MCNC: 232 MG/DL (ref 68–126)
HBA1C MFR BLD: 9.7 % (ref ?–5.7)

## 2023-04-20 ENCOUNTER — HOSPITAL ENCOUNTER (OUTPATIENT)
Dept: CV DIAGNOSTICS | Facility: HOSPITAL | Age: 68
Discharge: HOME | End: 2023-04-20
Attending: INTERNAL MEDICINE
Payer: MEDICARE

## 2023-04-20 DIAGNOSIS — R42 DIZZY SPELLS: ICD-10-CM

## 2023-04-20 DIAGNOSIS — R00.2 PALPITATIONS: ICD-10-CM

## 2023-04-20 PROCEDURE — 93225 XTRNL ECG REC<48 HRS REC: CPT | Performed by: INTERNAL MEDICINE

## 2023-04-28 ENCOUNTER — HOSPITAL ENCOUNTER (OUTPATIENT)
Dept: CT IMAGING | Facility: HOSPITAL | Age: 68
Discharge: HOME | End: 2023-04-28
Attending: INTERNAL MEDICINE
Payer: MEDICARE

## 2023-04-28 DIAGNOSIS — R51.9 CHRONIC NONINTRACTABLE HEADACHE, UNSPECIFIED HEADACHE TYPE: ICD-10-CM

## 2023-04-28 DIAGNOSIS — G89.29 CHRONIC NONINTRACTABLE HEADACHE, UNSPECIFIED HEADACHE TYPE: ICD-10-CM

## 2023-04-28 DIAGNOSIS — R42 DIZZY SPELLS: ICD-10-CM

## 2023-04-28 PROCEDURE — 70450 CT HEAD/BRAIN W/O DYE: CPT | Performed by: INTERNAL MEDICINE

## 2023-05-04 ENCOUNTER — OFFICE VISIT (OUTPATIENT)
Dept: INTERNAL MEDICINE CLINIC | Facility: CLINIC | Age: 68
End: 2023-05-04

## 2023-05-04 VITALS
HEART RATE: 76 BPM | BODY MASS INDEX: 29.96 KG/M2 | TEMPERATURE: 98 F | DIASTOLIC BLOOD PRESSURE: 78 MMHG | HEIGHT: 71 IN | RESPIRATION RATE: 18 BRPM | WEIGHT: 214 LBS | OXYGEN SATURATION: 98 % | SYSTOLIC BLOOD PRESSURE: 138 MMHG

## 2023-05-04 DIAGNOSIS — E11.65 TYPE 2 DIABETES MELLITUS WITH HYPERGLYCEMIA, WITH LONG-TERM CURRENT USE OF INSULIN (HCC): Primary | ICD-10-CM

## 2023-05-04 DIAGNOSIS — E87.5 HYPERKALEMIA: ICD-10-CM

## 2023-05-04 DIAGNOSIS — Z79.4 TYPE 2 DIABETES MELLITUS WITH HYPERGLYCEMIA, WITH LONG-TERM CURRENT USE OF INSULIN (HCC): Primary | ICD-10-CM

## 2023-05-04 LAB — POTASSIUM SERPL-SCNC: 4.4 MMOL/L (ref 3.5–5.1)

## 2023-05-04 PROCEDURE — 36415 COLL VENOUS BLD VENIPUNCTURE: CPT | Performed by: INTERNAL MEDICINE

## 2023-05-04 PROCEDURE — 99214 OFFICE O/P EST MOD 30 MIN: CPT | Performed by: INTERNAL MEDICINE

## 2023-05-09 ENCOUNTER — HOSPITAL ENCOUNTER (OUTPATIENT)
Dept: ULTRASOUND IMAGING | Facility: HOSPITAL | Age: 68
Discharge: HOME | End: 2023-05-09
Attending: INTERNAL MEDICINE
Payer: MEDICARE

## 2023-05-09 DIAGNOSIS — R42 DIZZY SPELLS: ICD-10-CM

## 2023-05-09 PROCEDURE — 93880 EXTRACRANIAL BILAT STUDY: CPT | Performed by: INTERNAL MEDICINE

## 2023-05-11 RX ORDER — ISOSORBIDE MONONITRATE 30 MG/1
30 TABLET, EXTENDED RELEASE ORAL DAILY
Qty: 90 TABLET | Refills: 3 | Status: SHIPPED | OUTPATIENT
Start: 2023-05-11

## 2023-05-11 RX ORDER — LEVOTHYROXINE SODIUM 0.12 MG/1
125 TABLET ORAL
Qty: 90 TABLET | Refills: 3 | Status: SHIPPED | OUTPATIENT
Start: 2023-05-11

## 2023-05-11 RX ORDER — AMLODIPINE BESYLATE 10 MG/1
10 TABLET ORAL DAILY
Qty: 90 TABLET | Refills: 3 | Status: SHIPPED | OUTPATIENT
Start: 2023-05-11

## 2023-05-11 RX ORDER — LOSARTAN POTASSIUM 25 MG/1
25 TABLET ORAL DAILY
Qty: 90 TABLET | Refills: 3 | Status: SHIPPED | OUTPATIENT
Start: 2023-05-11

## 2023-05-11 RX ORDER — ROSUVASTATIN CALCIUM 20 MG/1
20 TABLET, COATED ORAL NIGHTLY
Qty: 90 TABLET | Refills: 3 | Status: SHIPPED | OUTPATIENT
Start: 2023-05-11

## 2023-05-11 RX ORDER — ASPIRIN 81 MG/1
81 TABLET ORAL DAILY
Qty: 90 TABLET | Refills: 3 | Status: SHIPPED | OUTPATIENT
Start: 2023-05-11

## 2023-05-11 RX ORDER — INSULIN GLARGINE 100 [IU]/ML
60 INJECTION, SOLUTION SUBCUTANEOUS NIGHTLY
Qty: 10 ML | Refills: 5 | Status: SHIPPED | OUTPATIENT
Start: 2023-05-11

## 2023-05-11 RX ORDER — EZETIMIBE 10 MG/1
10 TABLET ORAL NIGHTLY
Qty: 90 TABLET | Refills: 3 | Status: SHIPPED | OUTPATIENT
Start: 2023-05-11

## 2023-05-11 RX ORDER — FUROSEMIDE 40 MG/1
40 TABLET ORAL 2 TIMES DAILY
Qty: 90 TABLET | Refills: 3 | Status: SHIPPED | OUTPATIENT
Start: 2023-05-11

## 2023-05-11 NOTE — TELEPHONE ENCOUNTER
Please review. Protocol Failed or has No Protocol. All requested medications listed as external. Please advise. Thanks. Requested Prescriptions   Pending Prescriptions Disp Refills    metFORMIN HCl 1000 MG Oral Tab 180 tablet 1     Sig: Take 1 tablet (1,000 mg total) by mouth 2 (two) times daily with meals. Diabetes Medication Protocol Failed - 5/11/2023 11:26 AM        Failed - Last A1C < 7.5 and within past 6 months     Lab Results   Component Value Date    A1C 9.7 (H) 04/18/2023               Passed - In person appointment or virtual visit in the past 6 mos or appointment in next 3 mos     Recent Outpatient Visits              1 week ago Type 2 diabetes mellitus with hyperglycemia, with long-term current use of insulin [facility]    Mitchell Pascal MD    Office Visit    3 weeks ago Encounter to establish care    Mitchell Pascal MD    Office Visit    2 years ago Other constipation    Gastroenterology - Katelyn Sutton MD    Office Visit          Future Appointments         Provider Department Appt Notes    In 3 weeks Kath Malhotra MD [address] [facility],[address], [address] [facility] never had px    In 3 months MILY [facility] [address], [place] CLN screen, wife made appt, policy informed               Passed - EGFRCR or GFRNAA > 50     GFR Evaluation  EGFRCR: 65 , resulted on 4/18/2023            Passed - GFR in the past 12 months          insulin glargine 100 UNIT/ML Subcutaneous Solution 10 mL 5     Sig: Inject 60 Units into the skin nightly.        Diabetes Medication Protocol Failed - 5/11/2023 11:26 AM        Failed - Last A1C < 7.5 and within past 6 months     Lab Results   Component Value Date    A1C 9.7 (H) 04/18/2023               Passed - In person appointment or virtual visit in the past 6 mos or appointment in next 3 mos Recent Outpatient Visits              1 week ago Type 2 diabetes mellitus with hyperglycemia, with long-term current use of insulin (HCC)    [address]    Office Visit    3 weeks ago Encounter to establish care    [address]    Office Visit    2 years ago Other constipation    Gastroenterology - Maicol Briones MD    Office Visit          Future Appointments         Provider Department Appt Notes    In 3 weeks Marissa Jenkins MD [address], [address] [facility] never had px    In 3 months [facility], [place] [facility] screen, wife made appt, policy informed               Passed - EGFRCR or GFRNAA > 50     GFR Evaluation  EGFRCR: 65 , resulted on 4/18/2023            Passed - GFR in the past 12 months          levothyroxine 125 MCG Oral Tab 90 tablet 1     Sig: Take 1 tablet (125 mcg total) by mouth before breakfast.       Thyroid Medication Protocol Failed - 5/11/2023 11:26 AM        Failed - TSH in past 12 months        Failed - Last TSH value is normal     No results found for: TSH, THYROIDFUNC, TSHT4              Passed - In person appointment or virtual visit in the past 12 mos or appointment in next 3 mos     Recent Outpatient Visits              1 week ago Type 2 diabetes mellitus with hyperglycemia, with long-term current use of insulin ([facility]    [address]    Office Visit    3 weeks ago Encounter to establish care    [address]    Office Visit    2 years ago Other constipation    Gastroenterology - Maicol Briones MD    Office Visit          Future Appointments         Provider Department Appt Notes    In 3 weeks MD [facility], [address] [facility] never had px    In 3 months MILY Ayoub, [address], [facility], wife made appt, policy informed                 rosuvastatin 20 MG Oral Tab 90 tablet 1     Sig: Take 1 tablet (20 mg total) by mouth nightly. Cholesterol Medication Protocol Failed - 5/11/2023 11:26 AM        Failed - LDL in past 12 months        Failed - Last LDL < 130     Lab Results   Component Value Date    LDL 98 02/22/2020               Passed - ALT in past 12 months        Passed - Last ALT < 80     Lab Results   Component Value Date    ALT 53 04/18/2023             Passed - In person appointment or virtual visit in the past 12 mos or appointment in next 3 mos     Recent Outpatient Visits              1 week ago Type 2 diabetes mellitus with hyperglycemia, with long-term current use of insulin ([address]    Talisha Larson MD    Office Visit    3 weeks ago Encounter to establish care    Talisha Larson MD    Office Visit    2 years ago Other constipation    [facility]    Office Visit          Future Appointments         Provider Department Appt Notes    In 3 weeks MD Tabatha Cedillo, [address] [facility] never had px    In 3 months MILY [facility] [place] [address], [place] CLN screen, wife made appt, policy informed                 aspirin 81 MG Oral Tab EC 90 tablet 1     Sig: Take 1 tablet (81 mg total) by mouth daily.        Aspirin Protocol Passed - 5/11/2023 11:26 AM        Passed - In person appointment or virtual visit in the past 6 mos or appointment in next 3 mos     Recent Outpatient Visits              1 week ago Type 2 diabetes mellitus with hyperglycemia, with long-term current use of insulin ([address]    Kel Meg Kasandra Garcia MD    Office Visit    3 weeks ago Encounter to establish care    Kasandra Baker MD    Office Visit    2 years ago Other constipation    Gastroenterology - Jalyn Blas MD    Office Visit          Future Appointments         Provider Department Appt Notes    In 3 weeks Trupti Childs MD Deyanira Love, [address] never had px    In 3 months MILY Campos [facility], [address], [place] CLN screen, wife made appt, policy informed                 isosorbide mononitrate ER 30 MG Oral Tablet 24 Hr 90 tablet 1     Sig: Take 1 tablet (30 mg total) by mouth daily. There is no refill protocol information for this order       ezetimibe 10 MG Oral Tab 90 tablet 1     Sig: Take 1 tablet (10 mg total) by mouth nightly.        Cholesterol Medication Protocol Failed - 5/11/2023 11:26 AM        Failed - LDL in past 12 months        Failed - Last LDL < 130     Lab Results   Component Value Date    LDL 98 02/22/2020               Passed - ALT in past 12 months        Passed - Last ALT < 80     Lab Results   Component Value Date    ALT 53 04/18/2023             Passed - In person appointment or virtual visit in the past 12 mos or appointment in next 3 mos     Recent Outpatient Visits              1 week ago Type 2 diabetes mellitus with hyperglycemia, with long-term current use of insulin ([address]    Kasandra Baker MD    Office Visit    3 weeks ago Encounter to establish care    Kasandra Baker MD    Office Visit    2 years ago Other constipation    Gastroenterology - Jalyn Blas MD    Office Visit          Future Appointments         Provider Department Appt Notes    In 3 weeks Trupti Childs MD [address] [facility] never had px    In 3 months Adalid Zabala, APRN Luz Hutchinson, [address], [place] CLN screen, wife made appt, policy informed                 amLODIPine 10 MG Oral Tab 90 tablet 1     Sig: Take 1 tablet (10 mg total) by mouth daily.        Hypertensive Medications Protocol Passed - 5/11/2023 11:26 AM        Passed - In person appointment in the past 12 or next 3 months     Recent Outpatient Visits              1 week ago Type 2 diabetes mellitus with hyperglycemia, with long-term current use of insulin ([facility]    Luz Hutchinson, [address], Keisha Alvarez MD    Office Visit    3 weeks ago Encounter to [address], Keisha Alvarez MD    Office Visit    2 years ago Other constipation    Gastroenterology - Jeanna Ashley, Levi Raymundo MD    Office Visit          Future Appointments         Provider Department Appt Notes    In 3 weeks MD Luz Wallace, [address], [facility] never had px    In 3 months MILY [facility] [facility] [address], [place] CLN screen, wife made appt, policy informed               Passed - Last BP reading less than 140/90     BP Readings from Last 1 Encounters:  05/04/23 : 138/78                Passed - CMP or BMP in past 6 months     Recent Results (from the past 4392 hour(s))   COMP METABOLIC PANEL (14)    Collection Time: 04/18/23  2:26 PM   Result Value Ref Range    Glucose 347 (H) 70 - 99 mg/dL    Sodium 138 136 - 145 mmol/L    Potassium 5.3 (H) 3.5 - 5.1 mmol/L    Chloride 107 98 - 112 mmol/L    CO2 23.0 21.0 - 32.0 mmol/L    Anion Gap 8 0 - 18 mmol/L    BUN 30 (H) 7 - 18 mg/dL    Creatinine 1.23 0.70 - 1.30 mg/dL    BUN/CREA Ratio 24.4 (H) 10.0 - 20.0    Calcium, Total 9.0 8.5 - 10.1 mg/dL    Calculated Osmolality 306 (H) 275 - 295 mOsm/kg    eGFR-Cr 65 >=60 mL/min/1.73m2    ALT 53 16 - 61 U/L    AST 30 15 - 37 U/L    Alkaline Phosphatase 46 45 - 117 U/L    Bilirubin, Total 0.2 0.1 - 2.0 mg/dL    Total Protein 6.4 6.4 - 8.2 g/dL    Albumin 3.4 3.4 - 5.0 g/dL    Globulin  3.0 2.8 - 4.4 g/dL    A/G Ratio 1.1 1.0 - 2.0    Patient Fasting for CMP? Yes      *Note: Due to a large number of results and/or encounters for the requested time period, some results have not been displayed. A complete set of results can be found in Results Review. Passed - In person appointment or virtual visit in the past 6 months     Recent Outpatient Visits              1 week ago Type 2 diabetes mellitus with hyperglycemia, with long-term current use of insulin ([facility])    Keri Azul MD    Office Visit    3 weeks ago Encounter to establish care    Keri Azul MD    Office Visit    2 years ago Other constipation    Gastroenterology - [address], Tavo Barrios MD    Office Visit          Future Appointments         Provider Department Appt Notes    In 3 weeks MD [facility], [address] never had px    In 3 months [facility] [address], [place] screen, wife made appt, policy informed               Passed - EGFRCR or GFRNAA > 50     GFR Evaluation  EGFRCR: 65 , resulted on 4/18/2023              losartan 25 MG Oral Tab 90 tablet 1     Sig: Take 1 tablet (25 mg total) by mouth daily.        Hypertensive Medications Protocol Passed - 5/11/2023 11:26 AM        Passed - In person appointment in the past 12 or next 3 months     Recent Outpatient Visits              1 week ago Type 2 diabetes mellitus with hyperglycemia, with long-term current use of insulin [facility])    Keri Azul MD    Office Visit    3 weeks ago Encounter to establish care    [address] [facility], David Pinto MD    Office Visit    2 years ago Other constipation    Gastroenterology - Vira Lui MD    Office Visit          Future Appointments         Provider Department Appt Notes    In 3 weeks MD [facility], [address], [address] never had px    In 3 months Alonna Flight, APRN [address], [address], [place] CLN screen, wife made appt, policy informed               Passed - Last BP reading less than 140/90     BP Readings from Last 1 Encounters:  05/04/23 : 138/78                Passed - CMP or BMP in past 6 months     Recent Results (from the past 4392 hour(s))   COMP METABOLIC PANEL (14)    Collection Time: 04/18/23  2:26 PM   Result Value Ref Range    Glucose 347 (H) 70 - 99 mg/dL    Sodium 138 136 - 145 mmol/L    Potassium 5.3 (H) 3.5 - 5.1 mmol/L    Chloride 107 98 - 112 mmol/L    CO2 23.0 21.0 - 32.0 mmol/L    Anion Gap 8 0 - 18 mmol/L    BUN 30 (H) 7 - 18 mg/dL    Creatinine 1.23 0.70 - 1.30 mg/dL    BUN/CREA Ratio 24.4 (H) 10.0 - 20.0    Calcium, Total 9.0 8.5 - 10.1 mg/dL    Calculated Osmolality 306 (H) 275 - 295 mOsm/kg    eGFR-Cr 65 >=60 mL/min/1.73m2    ALT 53 16 - 61 U/L    AST 30 15 - 37 U/L    Alkaline Phosphatase 46 45 - 117 U/L    Bilirubin, Total 0.2 0.1 - 2.0 mg/dL    Total Protein 6.4 6.4 - 8.2 g/dL    Albumin 3.4 3.4 - 5.0 g/dL    Globulin  3.0 2.8 - 4.4 g/dL    A/G Ratio 1.1 1.0 - 2.0    Patient Fasting for CMP? Yes      *Note: Due to a large number of results and/or encounters for the requested time period, some results have not been displayed. A complete set of results can be found in Results Review.                  Passed - In person appointment or virtual visit in the past 6 months     Recent Outpatient Visits              1 week ago Type 2 diabetes mellitus with hyperglycemia, with long-term current use of insulin ([facility]    [address]    Office Visit    3 weeks ago Encounter to establish care    [address]    Office Visit    2 years ago Other constipation    Gastroenterology - Edward Loop, Juan Baptiste MD    Office Visit          Future Appointments         Provider Department Appt Notes    In 3 weeks Maye Felipe MD [address], [address] never had px    In 3 months [facility], [address], [facility], wife made appt, policy informed               Passed - EGFRCR or GFRNAA > 50     GFR Evaluation  EGFRCR: 65 , resulted on 4/18/2023              furosemide 40 MG Oral Tab 90 tablet 1     Sig: Take 1 tablet (40 mg total) by mouth 2 (two) times daily.        Hypertensive Medications Protocol Passed - 5/11/2023 11:26 AM        Passed - In person appointment in the past 12 or next 3 months     Recent Outpatient Visits              1 week ago Type 2 diabetes mellitus with hyperglycemia, with long-term current use of insulin ([address]    [address] MD    Office Visit    3 weeks ago Encounter to establish care    [address] Makayla Dickinson MD    Office Visit    2 years ago Other constipation    Gastroenterology - Edward Rodriguez, Juan Baptiste MD    Office Visit          Future Appointments         Provider Department Appt Notes    In 3 weeks Maye Felipe MD [address], [address] never had px    In 3 months MILY Villatoro [address], [address], [place] CLN screen, wife made appt, policy informed               Passed - Last BP reading less than 140/90     BP Readings from Last 1 Encounters:  05/04/23 : 138/78                Passed - CMP or BMP in past 6 months     Recent Results (from the past 4392 hour(s))   COMP METABOLIC PANEL (14)    Collection Time: 04/18/23  2:26 PM   Result Value Ref Range    Glucose 347 (H) 70 - 99 mg/dL    Sodium 138 136 - 145 mmol/L    Potassium 5.3 (H) 3.5 - 5.1 mmol/L    Chloride 107 98 - 112 mmol/L    CO2 23.0 21.0 - 32.0 mmol/L    Anion Gap 8 0 - 18 mmol/L    BUN 30 (H) 7 - 18 mg/dL    Creatinine 1.23 0.70 - 1.30 mg/dL    BUN/CREA Ratio 24.4 (H) 10.0 - 20.0    Calcium, Total 9.0 8.5 - 10.1 mg/dL    Calculated Osmolality 306 (H) 275 - 295 mOsm/kg    eGFR-Cr 65 >=60 mL/min/1.73m2    ALT 53 16 - 61 U/L    AST 30 15 - 37 U/L    Alkaline Phosphatase 46 45 - 117 U/L    Bilirubin, Total 0.2 0.1 - 2.0 mg/dL    Total Protein 6.4 6.4 - 8.2 g/dL    Albumin 3.4 3.4 - 5.0 g/dL    Globulin  3.0 2.8 - 4.4 g/dL    A/G Ratio 1.1 1.0 - 2.0    Patient Fasting for CMP? Yes      *Note: Due to a large number of results and/or encounters for the requested time period, some results have not been displayed. A complete set of results can be found in Results Review.                  Passed - In person appointment or virtual visit in the past 6 months     Recent Outpatient Visits              1 week ago Type 2 diabetes mellitus with hyperglycemia, with long-term current use of insulin ([facility] [address]    Breanna Lott MD    Office Visit    3 weeks ago Encounter to establish care    Breanna Lott MD    Office Visit    2 years ago Other constipation    Gastroenterology - [address] Rob Stone MD    Office Visit          Future Appointments         Provider Department Appt Notes    In 3 weeks MD Alfonso Damon, [address] never had px    In 3 months MILY [place] [address], [place] CLN screen, wife made appt, policy informed               Passed - [facility] or GFRNAA > 50     GFR Evaluation  EGFRCR: 65 , resulted on 4/18/2023                 Recent Outpatient Visits              1 week ago Type 2 diabetes mellitus with hyperglycemia, with long-term current use of insulin ([facility])    [address], [address], Jovani Verdin MD    Office Visit    3 weeks ago Encounter to establish care    [address] [facility], Jovani Verdin MD    Office Visit    2 years ago Other constipation    Gastroenterology - Ashish Landrum MD    Office Visit            Future Appointments         Provider Department Appt Notes    In 3 weeks Uma Kennedy MD [address], [address] [facility] never had px    In 3 months Lisa Postal, APRN [address], [place] CLN screen, wife made appt, policy informed

## 2023-05-11 NOTE — TELEPHONE ENCOUNTER
Spouse stated that patient has new insurance and needs a refill on all his medications. States pharmacy stated that patient needs prior authorization for the humalog. Chucky transferred patient to phone room to update insurance information.

## 2023-08-23 ENCOUNTER — TELEPHONE (OUTPATIENT)
Dept: INTERNAL MEDICINE CLINIC | Facility: CLINIC | Age: 68
End: 2023-08-23

## 2023-12-26 ENCOUNTER — HOSPITAL ENCOUNTER (EMERGENCY)
Facility: HOSPITAL | Age: 68
Discharge: HOME | End: 2023-12-27
Attending: EMERGENCY MEDICINE
Payer: MEDICAID

## 2023-12-26 DIAGNOSIS — L03.811 CELLULITIS OF HEAD EXCEPT FACE: Primary | ICD-10-CM

## 2023-12-26 PROCEDURE — 10060 I&D ABSCESS SIMPLE/SINGLE: CPT

## 2023-12-26 PROCEDURE — 99284 EMERGENCY DEPT VISIT MOD MDM: CPT

## 2023-12-26 PROCEDURE — 36415 COLL VENOUS BLD VENIPUNCTURE: CPT

## 2023-12-26 RX ORDER — LIDOCAINE HYDROCHLORIDE 10 MG/ML
20 INJECTION, SOLUTION EPIDURAL; INFILTRATION; INTRACAUDAL; PERINEURAL ONCE
Status: COMPLETED | OUTPATIENT
Start: 2023-12-26 | End: 2023-12-26

## 2023-12-26 RX ORDER — CIPROFLOXACIN 500 MG/1
500 TABLET, FILM COATED ORAL 2 TIMES DAILY
Qty: 20 TABLET | Refills: 0 | Status: SHIPPED | OUTPATIENT
Start: 2023-12-26 | End: 2024-01-05

## 2023-12-26 RX ORDER — CLINDAMYCIN HYDROCHLORIDE 300 MG/1
300 CAPSULE ORAL 3 TIMES DAILY
Qty: 30 CAPSULE | Refills: 0 | Status: SHIPPED | OUTPATIENT
Start: 2023-12-26 | End: 2024-01-05

## 2023-12-26 RX ORDER — HYDROCODONE BITARTRATE AND ACETAMINOPHEN 5; 325 MG/1; MG/1
1 TABLET ORAL EVERY 6 HOURS PRN
Qty: 15 TABLET | Refills: 0 | Status: SHIPPED | OUTPATIENT
Start: 2023-12-26

## 2023-12-26 RX ORDER — HYDROCODONE BITARTRATE AND ACETAMINOPHEN 5; 325 MG/1; MG/1
1 TABLET ORAL ONCE
Status: COMPLETED | OUTPATIENT
Start: 2023-12-26 | End: 2023-12-26

## 2023-12-27 VITALS
DIASTOLIC BLOOD PRESSURE: 82 MMHG | SYSTOLIC BLOOD PRESSURE: 156 MMHG | WEIGHT: 212 LBS | OXYGEN SATURATION: 98 % | HEART RATE: 62 BPM | BODY MASS INDEX: 29.68 KG/M2 | HEIGHT: 71 IN | TEMPERATURE: 98 F | RESPIRATION RATE: 16 BRPM

## 2023-12-27 PROCEDURE — 87070 CULTURE OTHR SPECIMN AEROBIC: CPT | Performed by: EMERGENCY MEDICINE

## 2023-12-27 PROCEDURE — 87077 CULTURE AEROBIC IDENTIFY: CPT | Performed by: EMERGENCY MEDICINE

## 2023-12-27 PROCEDURE — 87205 SMEAR GRAM STAIN: CPT | Performed by: EMERGENCY MEDICINE

## 2023-12-30 ENCOUNTER — HOSPITAL ENCOUNTER (INPATIENT)
Facility: HOSPITAL | Age: 68
LOS: 6 days | Discharge: HOME | End: 2024-01-05
Attending: EMERGENCY MEDICINE | Admitting: INTERNAL MEDICINE

## 2023-12-30 ENCOUNTER — APPOINTMENT (OUTPATIENT)
Dept: CT IMAGING | Facility: HOSPITAL | Age: 68
End: 2023-12-30
Attending: EMERGENCY MEDICINE

## 2023-12-30 DIAGNOSIS — Z96.21 COCHLEAR IMPLANT IN PLACE: ICD-10-CM

## 2023-12-30 DIAGNOSIS — L03.811 CELLULITIS OF HEAD OR SCALP: Primary | ICD-10-CM

## 2023-12-30 PROBLEM — E87.1 HYPONATREMIA: Status: ACTIVE | Noted: 2023-12-30

## 2023-12-30 PROBLEM — E87.5 HYPERKALEMIA: Status: ACTIVE | Noted: 2023-12-30

## 2023-12-30 PROBLEM — R73.9 HYPERGLYCEMIA: Status: ACTIVE | Noted: 2023-12-30

## 2023-12-30 PROBLEM — R79.89 AZOTEMIA: Status: ACTIVE | Noted: 2023-12-30

## 2023-12-30 LAB
ANION GAP SERPL CALC-SCNC: 2 MMOL/L (ref 0–18)
BASOPHILS # BLD AUTO: 0.06 X10(3) UL (ref 0–0.2)
BASOPHILS NFR BLD AUTO: 0.6 %
BILIRUB UR QL: NEGATIVE
BUN BLD-MCNC: 32 MG/DL (ref 9–23)
BUN/CREAT SERPL: 21.8 (ref 10–20)
CALCIUM BLD-MCNC: 9.3 MG/DL (ref 8.7–10.4)
CHLORIDE SERPL-SCNC: 104 MMOL/L (ref 98–112)
CLARITY UR: CLEAR
CO2 SERPL-SCNC: 26 MMOL/L (ref 21–32)
COLOR UR: YELLOW
CREAT BLD-MCNC: 1.47 MG/DL
DEPRECATED RDW RBC AUTO: 39.9 FL (ref 35.1–46.3)
EGFRCR SERPLBLD CKD-EPI 2021: 52 ML/MIN/1.73M2 (ref 60–?)
EOSINOPHIL # BLD AUTO: 0.35 X10(3) UL (ref 0–0.7)
EOSINOPHIL NFR BLD AUTO: 3.5 %
ERYTHROCYTE [DISTWIDTH] IN BLOOD BY AUTOMATED COUNT: 13.4 % (ref 11–15)
GLUCOSE BLD-MCNC: 350 MG/DL (ref 70–99)
GLUCOSE BLDC GLUCOMTR-MCNC: 263 MG/DL (ref 70–99)
GLUCOSE BLDC GLUCOMTR-MCNC: 312 MG/DL (ref 70–99)
GLUCOSE UR-MCNC: 300 MG/DL
HCT VFR BLD AUTO: 40.7 %
HGB BLD-MCNC: 13.8 G/DL
HGB UR QL STRIP.AUTO: NEGATIVE
IMM GRANULOCYTES # BLD AUTO: 0.04 X10(3) UL (ref 0–1)
IMM GRANULOCYTES NFR BLD: 0.4 %
KETONES UR-MCNC: NEGATIVE MG/DL
LACTATE SERPL-SCNC: 1.9 MMOL/L (ref 0.5–2)
LEUKOCYTE ESTERASE UR QL STRIP.AUTO: NEGATIVE
LYMPHOCYTES # BLD AUTO: 1.67 X10(3) UL (ref 1–4)
LYMPHOCYTES NFR BLD AUTO: 16.9 %
MCH RBC QN AUTO: 28 PG (ref 26–34)
MCHC RBC AUTO-ENTMCNC: 33.9 G/DL (ref 31–37)
MCV RBC AUTO: 82.6 FL
MONOCYTES # BLD AUTO: 0.78 X10(3) UL (ref 0.1–1)
MONOCYTES NFR BLD AUTO: 7.9 %
NEUTROPHILS # BLD AUTO: 7 X10 (3) UL (ref 1.5–7.7)
NEUTROPHILS # BLD AUTO: 7 X10(3) UL (ref 1.5–7.7)
NEUTROPHILS NFR BLD AUTO: 70.7 %
NITRITE UR QL STRIP.AUTO: NEGATIVE
OSMOLALITY SERPL CALC.SUM OF ELEC: 295 MOSM/KG (ref 275–295)
PH UR: 6 [PH] (ref 5–8)
PLATELET # BLD AUTO: 191 10(3)UL (ref 150–450)
POTASSIUM SERPL-SCNC: 5.2 MMOL/L (ref 3.5–5.1)
PROT UR-MCNC: 300 MG/DL
RBC # BLD AUTO: 4.93 X10(6)UL
SODIUM SERPL-SCNC: 132 MMOL/L (ref 136–145)
SP GR UR STRIP: 1.02 (ref 1–1.03)
UROBILINOGEN UR STRIP-ACNC: NORMAL
WBC # BLD AUTO: 9.9 X10(3) UL (ref 4–11)

## 2023-12-30 PROCEDURE — 70450 CT HEAD/BRAIN W/O DYE: CPT | Performed by: EMERGENCY MEDICINE

## 2023-12-30 RX ORDER — NICOTINE POLACRILEX 4 MG
30 LOZENGE BUCCAL
Status: DISCONTINUED | OUTPATIENT
Start: 2023-12-30 | End: 2024-01-05

## 2023-12-30 RX ORDER — DEXTROSE MONOHYDRATE 25 G/50ML
50 INJECTION, SOLUTION INTRAVENOUS
Status: DISCONTINUED | OUTPATIENT
Start: 2023-12-30 | End: 2024-01-05

## 2023-12-30 RX ORDER — VANCOMYCIN HYDROCHLORIDE
15 EVERY 24 HOURS
Status: DISCONTINUED | OUTPATIENT
Start: 2023-12-31 | End: 2024-01-02

## 2023-12-30 RX ORDER — ISOSORBIDE MONONITRATE 30 MG/1
30 TABLET, EXTENDED RELEASE ORAL DAILY
Status: DISCONTINUED | OUTPATIENT
Start: 2023-12-30 | End: 2024-01-05

## 2023-12-30 RX ORDER — HEPARIN SODIUM 5000 [USP'U]/ML
5000 INJECTION, SOLUTION INTRAVENOUS; SUBCUTANEOUS EVERY 8 HOURS SCHEDULED
Status: DISCONTINUED | OUTPATIENT
Start: 2023-12-30 | End: 2024-01-05

## 2023-12-30 RX ORDER — LOSARTAN POTASSIUM 25 MG/1
25 TABLET ORAL DAILY
Status: DISCONTINUED | OUTPATIENT
Start: 2023-12-30 | End: 2024-01-05

## 2023-12-30 RX ORDER — ASPIRIN 81 MG/1
81 TABLET ORAL DAILY
Status: DISCONTINUED | OUTPATIENT
Start: 2023-12-30 | End: 2024-01-05

## 2023-12-30 RX ORDER — HYDROCODONE BITARTRATE AND ACETAMINOPHEN 5; 325 MG/1; MG/1
1 TABLET ORAL EVERY 6 HOURS PRN
Status: DISCONTINUED | OUTPATIENT
Start: 2023-12-30 | End: 2024-01-03

## 2023-12-30 RX ORDER — EZETIMIBE 10 MG/1
10 TABLET ORAL NIGHTLY
Status: DISCONTINUED | OUTPATIENT
Start: 2023-12-30 | End: 2024-01-05

## 2023-12-30 RX ORDER — NICOTINE POLACRILEX 4 MG
15 LOZENGE BUCCAL
Status: DISCONTINUED | OUTPATIENT
Start: 2023-12-30 | End: 2024-01-05

## 2023-12-30 RX ORDER — AMLODIPINE BESYLATE 10 MG/1
10 TABLET ORAL DAILY
Status: DISCONTINUED | OUTPATIENT
Start: 2023-12-30 | End: 2024-01-05

## 2023-12-30 RX ORDER — ROSUVASTATIN CALCIUM 20 MG/1
20 TABLET, COATED ORAL NIGHTLY
Status: DISCONTINUED | OUTPATIENT
Start: 2023-12-30 | End: 2024-01-05

## 2023-12-30 RX ORDER — FUROSEMIDE 40 MG/1
40 TABLET ORAL 2 TIMES DAILY
Status: DISCONTINUED | OUTPATIENT
Start: 2023-12-30 | End: 2024-01-05

## 2023-12-30 RX ORDER — VANCOMYCIN HYDROCHLORIDE
15 ONCE
Status: COMPLETED | OUTPATIENT
Start: 2023-12-30 | End: 2023-12-30

## 2023-12-30 RX ORDER — LEVOTHYROXINE SODIUM 0.12 MG/1
125 TABLET ORAL
Status: DISCONTINUED | OUTPATIENT
Start: 2023-12-31 | End: 2024-01-05

## 2023-12-30 NOTE — ED INITIAL ASSESSMENT (HPI)
Patient complains of pain, swelling, and drainage to site of left cochlear implant, associated with headache. Was seen here Thursday, was irrigated, and placed on abx. Here for worsening symptoms.

## 2023-12-31 LAB
BASOPHILS # BLD AUTO: 0.05 X10(3) UL (ref 0–0.2)
BASOPHILS NFR BLD AUTO: 0.7 %
DEPRECATED RDW RBC AUTO: 38.6 FL (ref 35.1–46.3)
EOSINOPHIL # BLD AUTO: 0.37 X10(3) UL (ref 0–0.7)
EOSINOPHIL NFR BLD AUTO: 4.8 %
ERYTHROCYTE [DISTWIDTH] IN BLOOD BY AUTOMATED COUNT: 13.1 % (ref 11–15)
EST. AVERAGE GLUCOSE BLD GHB EST-MCNC: 232 MG/DL (ref 68–126)
GLUCOSE BLDC GLUCOMTR-MCNC: 139 MG/DL (ref 70–99)
GLUCOSE BLDC GLUCOMTR-MCNC: 175 MG/DL (ref 70–99)
GLUCOSE BLDC GLUCOMTR-MCNC: 313 MG/DL (ref 70–99)
GLUCOSE BLDC GLUCOMTR-MCNC: 329 MG/DL (ref 70–99)
HBA1C MFR BLD: 9.7 % (ref ?–5.7)
HCT VFR BLD AUTO: 34.4 %
HGB BLD-MCNC: 11.8 G/DL
IMM GRANULOCYTES # BLD AUTO: 0.02 X10(3) UL (ref 0–1)
IMM GRANULOCYTES NFR BLD: 0.3 %
LYMPHOCYTES # BLD AUTO: 1.61 X10(3) UL (ref 1–4)
LYMPHOCYTES NFR BLD AUTO: 20.9 %
MCH RBC QN AUTO: 28 PG (ref 26–34)
MCHC RBC AUTO-ENTMCNC: 34.3 G/DL (ref 31–37)
MCV RBC AUTO: 81.5 FL
MONOCYTES # BLD AUTO: 0.76 X10(3) UL (ref 0.1–1)
MONOCYTES NFR BLD AUTO: 9.9 %
NEUTROPHILS # BLD AUTO: 4.88 X10 (3) UL (ref 1.5–7.7)
NEUTROPHILS # BLD AUTO: 4.88 X10(3) UL (ref 1.5–7.7)
NEUTROPHILS NFR BLD AUTO: 63.4 %
PLATELET # BLD AUTO: 176 10(3)UL (ref 150–450)
RBC # BLD AUTO: 4.22 X10(6)UL
WBC # BLD AUTO: 7.7 X10(3) UL (ref 4–11)

## 2023-12-31 PROCEDURE — 11042 DBRDMT SUBQ TIS 1ST 20SQCM/<: CPT | Performed by: STUDENT IN AN ORGANIZED HEALTH CARE EDUCATION/TRAINING PROGRAM

## 2023-12-31 PROCEDURE — 99223 1ST HOSP IP/OBS HIGH 75: CPT | Performed by: STUDENT IN AN ORGANIZED HEALTH CARE EDUCATION/TRAINING PROGRAM

## 2023-12-31 PROCEDURE — 99222 1ST HOSP IP/OBS MODERATE 55: CPT | Performed by: INTERNAL MEDICINE

## 2023-12-31 RX ORDER — LIDOCAINE HYDROCHLORIDE AND EPINEPHRINE 10; 10 MG/ML; UG/ML
10 INJECTION, SOLUTION INFILTRATION; PERINEURAL ONCE
Qty: 20 ML | Refills: 0 | Status: COMPLETED | OUTPATIENT
Start: 2023-12-31 | End: 2023-12-31

## 2023-12-31 RX ORDER — LIDOCAINE HYDROCHLORIDE AND EPINEPHRINE 10; 10 MG/ML; UG/ML
10 INJECTION, SOLUTION INFILTRATION; PERINEURAL ONCE
Status: DISCONTINUED | OUTPATIENT
Start: 2023-12-31 | End: 2023-12-31

## 2023-12-31 NOTE — PROGRESS NOTES
[facility] Note:  Renal Adjustment for cefepime (MAXIPIME)    Samy Hoskins is a 68 year old patient who has been prescribed cefepime (MAXIPIME) 1000 mg every 12 hrs.  The estimated creatinine clearance is 52.6 mL/min (A) (based on SCr of 1.47 mg/dL (H)). The dose has been adjusted to cefepime (MAXIPIME) 1000 mg every 8 hrs per hospital renal dose adjustment protocol for treatment of cellulitis.  Pharmacy will follow and adjust dose as warranted for additional renal function changes.    Thank you,    Zayra Sung, PharmD  12/30/2023  10:45 PM

## 2023-12-31 NOTE — ED PROVIDER NOTES
Patient Seen in: [facility] Emergency Department      History     Chief Complaint   Patient presents with    Abscess     Stated Complaint: Abscess    Subjective:   HPI    Patient presents emergency department with increasing pain, drainage and swelling around the site of a cochlear implant on his left scalp.  He was seen 2 days ago, had some debridement performed and he was started on 2 different antibiotics but he states that he feels worse today than he did then with increasing pain, some mild dizziness and nausea.  There is no neck pain or neck stiffness.  There is no other aggravating relieving factors.  He has history of diabetes and is unsure of how his sugars been running.    Objective:   Past Medical History:   Diagnosis Date    Atherosclerosis of coronary artery     Deaf, right     Diabetes (HCC)     Essential hypertension     Hyperlipidemia     Thyroid disease               Past Surgical History:   Procedure Laterality Date    CORONARY STENT PLACEMENT      ROTATOR CUFF REPAIR Left                 Social History     Socioeconomic History    Marital status:    Tobacco Use    Smoking status: Former     Passive exposure: Never    Smokeless tobacco: Never   Vaping Use    Vaping Use: Never used   Substance and Sexual Activity    Alcohol use: Not Currently    Drug use: Never     Social Determinants of Health     Food Insecurity: No Food Insecurity (12/30/2023)    Food Insecurity     Food Insecurity: Never true   Transportation Needs: No Transportation Needs (12/30/2023)    Transportation Needs     Lack of Transportation: No   Housing Stability: Low Risk  (12/30/2023)    Housing Stability     Housing Instability: No              Review of Systems    Positive for stated complaint: Abscess  Other systems are as noted in HPI.  Constitutional and vital signs reviewed.      All other systems reviewed and negative except as noted above.    Physical Exam     ED Triage Vitals [12/30/23 1313]   /71    Pulse 63   Resp 18   Temp 97.8 °F (36.6 °C)   Temp src Temporal   SpO2 98 %   O2 Device None (Room air)       Current:/74   Pulse 58   Temp 97.8 °F (36.6 °C) (Temporal)   Resp 18   Wt 96.2 kg   SpO2 97%   BMI 29.58 kg/m²         Physical Exam  Vitals and nursing note reviewed.   Constitutional:       General: He is not in acute distress.     Appearance: He is well-developed.   HENT:      Head: Normocephalic.      Nose: Nose normal.      Mouth/Throat:      Mouth: Mucous membranes are moist.   Eyes:      Conjunctiva/sclera: Conjunctivae normal.   Cardiovascular:      Rate and Rhythm: Normal rate and regular rhythm.      Heart sounds: No murmur heard.  Pulmonary:      Effort: Pulmonary effort is normal. No respiratory distress.      Breath sounds: Normal breath sounds.   Abdominal:      General: There is no distension.      Palpations: Abdomen is soft.      Tenderness: There is no abdominal tenderness.   Musculoskeletal:         General: No tenderness. Normal range of motion.      Cervical back: Normal range of motion and neck supple.   Skin:     Comments: On the left parietal scalp there is granulation tissue as well as cellulitic changes which are tender to the touch surrounding the cochlear implant post.  There is no fluctuance.  There is mild drainage.   Neurological:      Mental Status: He is alert and oriented to person, place, and time.               ED Course     Labs Reviewed   BASIC METABOLIC PANEL (8) - Abnormal; Notable for the following components:       Result Value    Glucose 350 (*)     Sodium 132 (*)     Potassium 5.2 (*)     BUN 32 (*)     Creatinine 1.47 (*)     BUN/CREA Ratio 21.8 (*)     eGFR-Cr 52 (*)     All other components within normal limits   URINALYSIS, ROUTINE - Abnormal; Notable for the following components:    Glucose Urine 300 (*)     Protein Urine 300 (*)     All other components within normal limits   POCT GLUCOSE - Abnormal; Notable for the following components:    POC  Glucose  312 (*)     All other components within normal limits   POCT GLUCOSE - Abnormal; Notable for the following components:    POC Glucose  263 (*)     All other components within normal limits   LACTIC ACID, PLASMA - Normal   CBC WITH DIFFERENTIAL WITH PLATELET    Narrative:     The following orders were created for panel order CBC With Differential With Platelet.  Procedure                               Abnormality         Status                     ---------                               -----------         ------                     CBC W/ DIFFERENTIAL[[id number]]                              Final result                 Please view results for these tests on the individual orders.   AEROBIC BACTERIAL CULTURE   BLOOD CULTURE   BLOOD CULTURE   CBC W/ DIFFERENTIAL                      [facility]        Admission disposition: 12/30/2023  7:07 PM                       Medical Decision Making  Differential diagnosis considered for abscess, cellulitis, foreign body reaction.    Problems Addressed:  Cellulitis of head or scalp: acute illness or injury  Cochlear implant in place: acute illness or injury    Amount and/or Complexity of Data Reviewed  Labs: ordered. Decision-making details documented in ED Course.     Details: Laboratory studies show elevated blood sugar.  Radiology: ordered and independent interpretation performed. Decision-making details documented in ED Course.     Details: CT scan shows cellulitis but no obvious abscess  Discussion of management or test interpretation with external provider(s): This case was discussed with the covering physician for the patient and requiring admission for IV antibiotics as well as tighter blood sugar control.  I spoke with infectious disease who recommended Vanco and cefepime.  I also spoke with ENT, Dr. Nguyen who reviewed the CT scan and stated that this is not actually a true cochlear implant that is more of a bolt screwed into the skull to assist with hearing.  He  states that eventually this will likely need to be removed but he would see the patient in consultation.    Risk  Prescription drug management.  Drug therapy requiring intensive monitoring for toxicity.  Decision regarding hospitalization.        Disposition and Plan     Clinical Impression:  1. Cellulitis of head or scalp    2. Cochlear implant in place         Disposition:  Admit  12/30/2023  7:07 pm    Follow-up:  No follow-up provider specified.  We recommend that you schedule follow up care with a primary care provider within the next three months to obtain basic health screening including reassessment of your blood pressure.      Medications Prescribed:  Current Discharge Medication List                            Hospital Problems       Present on Admission  Date Reviewed: 5/4/2023            ICD-10-CM Noted POA    * (Principal) Cellulitis of head or scalp L03.811 12/30/2023 Unknown    Azotemia R79.89 12/30/2023 Yes    Cochlear implant in place Z96.21 12/30/2023 Unknown    Hyperglycemia R73.9 12/30/2023 Yes    Hyperkalemia E87.5 12/30/2023 Yes    Hyponatremia E87.1 12/30/2023 Yes

## 2023-12-31 NOTE — ED QUICK NOTES
Transport at bedside. Patient aware of plan of care, verbalizes understanding. Brought to floor with belongings. Family at bedside.

## 2023-12-31 NOTE — CONSULTS
[facility]    Report of Consultation    Samy Hoskins Patient Status:  Inpatient    1955 MRN [id number]   Location [facility] 5SW/SE Attending Leatha Marie MD   Hosp Day # 1 PCP Kaleb Marie MD     Date of Admission:  2023  Date of Consult:  2023  Reason for Consultation:   Scalp infection    History of Present Illness:   Patient is a 68 year old male who was admitted to the hospital for    A left sided scalp infection.  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  He had it placed about 2 to 3 years ago by Dr. Chris at [facility].  He has been dealing with this infection for 3 weeks now.  His glucose is not controlled it is in the 300s.  He was seen several days ago in the emergency room where an I&D was performed of this wound site cultures were taken but they did not grow anything.  He failed outpatient antibiotics and is now admitted for IV therapy.  He reports exquisite tenderness around the implant site of his left scalp.  CT scan did not demonstrate any abscess just soft tissue thickening.    Past Medical History  Past Medical History:   Diagnosis Date    Atherosclerosis of coronary artery     Deaf, right     Diabetes (HCC)     Essential hypertension     Hyperlipidemia     Thyroid disease        Past Surgical History  Past Surgical History:   Procedure Laterality Date    CORONARY STENT PLACEMENT      ROTATOR CUFF REPAIR Left        Family History  History reviewed. No pertinent family history.    Social History  Pediatric History   Patient Parents    Not on file     Other Topics Concern    Not on file   Social History Narrative    Not on file           Current Medications:  Current Facility-Administered Medications   Medication Dose Route Frequency    ceFEPIme (Maxipime) 1 g in sodium chloride 0.9% 100 mL IVPB-MBP  1 g Intravenous Q8H    insulin aspart (NovoLOG) 100 Units/mL FlexPen 18 Units  18 Units Subcutaneous TID CC    insulin detemir  (Levemir) 100 UNIT/ML FlexPen/FlexTouch 55 Units  55 Units Subcutaneous Nightly    vancomycin (Vancocin) 1.5 g in sodium chloride 0.9% 250mL IVPB premix  15 mg/kg Intravenous Q24H    glucose (Dex4) 15 GM/59ML oral liquid 15 g  15 g Oral Q15 Min PRN    Or    glucose (Glutose) 40% oral gel 15 g  15 g Oral Q15 Min PRN    Or    glucose-vitamin C (Dex-4) chewable tab 4 tablet  4 tablet Oral Q15 Min PRN    Or    dextrose 50% injection 50 mL  50 mL Intravenous Q15 Min PRN    Or    glucose (Dex4) 15 GM/59ML oral liquid 30 g  30 g Oral Q15 Min PRN    Or    glucose (Glutose) 40% oral gel 30 g  30 g Oral Q15 Min PRN    Or    glucose-vitamin C (Dex-4) chewable tab 8 tablet  8 tablet Oral Q15 Min PRN    insulin aspart (NovoLOG) 100 Units/mL FlexPen 1-5 Units  1-5 Units Subcutaneous TID CC    amLODIPine (Norvasc) tab 10 mg  10 mg Oral Daily    aspirin DR tab 81 mg  81 mg Oral Daily    ezetimibe (Zetia) tab 10 mg  10 mg Oral Nightly    furosemide (Lasix) tab 40 mg  40 mg Oral BID    HYDROcodone-acetaminophen (Norco) 5-325 MG per tab 1 tablet  1 tablet Oral Q6H PRN    isosorbide mononitrate ER (Imdur) 24 hr tab 30 mg  30 mg Oral Daily    levothyroxine (Synthroid) tab 125 mcg  125 mcg Oral Before breakfast    losartan (Cozaar) tab 25 mg  25 mg Oral Daily    rosuvastatin (Crestor) tab 20 mg  20 mg Oral Nightly    heparin (Porcine) 5000 UNIT/ML injection 5,000 Units  5,000 Units Subcutaneous Q8H REJI    influenza vaccine high dose quad (Fluzone QIV HD) 0.7 mL IM injection (ages >/= 65 years) 0.7 mL  0.7 mL Intramuscular Prior to discharge     Medications Prior to Admission   Medication Sig    ciprofloxacin 500 MG Oral Tab Take 1 tablet (500 mg total) by mouth 2 (two) times daily for 10 days.    clindamycin 300 MG Oral Cap Take 1 capsule (300 mg total) by mouth 3 (three) times daily for 10 days.    HYDROcodone-acetaminophen 5-325 MG Oral Tab Take 1 tablet by mouth every 6 (six) hours as needed for Pain.    metFORMIN HCl 1000 MG Oral Tab  Take 1 tablet (1,000 mg total) by mouth 2 (two) times daily with meals.    levothyroxine 125 MCG Oral Tab Take 1 tablet (125 mcg total) by mouth before breakfast.    rosuvastatin 20 MG Oral Tab Take 1 tablet (20 mg total) by mouth nightly.    aspirin 81 MG Oral Tab EC Take 1 tablet (81 mg total) by mouth daily.    isosorbide mononitrate ER 30 MG Oral Tablet 24 Hr Take 1 tablet (30 mg total) by mouth daily.    ezetimibe 10 MG Oral Tab Take 1 tablet (10 mg total) by mouth nightly.    amLODIPine 10 MG Oral Tab Take 1 tablet (10 mg total) by mouth daily.    losartan 25 MG Oral Tab Take 1 tablet (25 mg total) by mouth daily.    furosemide 40 MG Oral Tab Take 1 tablet (40 mg total) by mouth 2 (two) times daily.    Cholecalciferol (VITAMIN D3) 1.25 MG (14030 UT) Oral Cap Take 1 capsule by mouth once a week.    insulin glargine 100 UNIT/ML Subcutaneous Solution Inject 60 Units into the skin nightly.    insulin lispro (HUMALOG) 100 UNIT/ML Subcutaneous Solution Inject 20 Units into the skin in the morning, at noon, and at bedtime.       Allergies  Allergies   Allergen Reactions    Penicillins SWELLING    Shrimp OTHER (SEE COMMENTS)     Throat tightness       Review of Systems:   Pertinent items are noted in HPI.    Physical Exam:   Blood pressure 123/70, pulse 57, temperature 98.1 °F (36.7 °C), temperature source Oral, resp. rate 18, weight 212 lb 1.3 oz (96.2 kg), SpO2 96%.         Constitutional Normal Overall appearance -resting comfortably in bed   Psychiatric Normal Orientation - Oriented to time, place, person & situation. Appropriate mood and affect.   Neck Exam Normal Inspection - Normal. Palpation - Normal. Parotid gland - Normal. Thyroid gland - Normal.   Eyes Normal Conjunctiva - Right: Normal, Left: Normal. Pupil - Right: Normal, Left: Normal.     Neurological Normal Memory - Normal. Cranial nerves - Cranial nerves II through XII grossly intact.   Head/Face Normal Facial features - Normal. Eyebrows - Normal.    Posterior to the left ear is the bone-anchored hearing aid bolt.  Surrounding this is indurated and erythematous and tender skin.  At the superior anterior margin there was crusting and a small amount of mucoid purulence with an opening under the skin possibly from the previous incision site.  Mild overlying cellulitis.  Small rim of granulation tissue around the Bolz.  I debrided this crusting and mucus and sent this for culture.  I placed Betadine soaked iodoform gauze under the skin circumferentially around the bolt.  There is no abscess or fluctuance of the area.        Nose/Nasopharynx mouth throat Normal External nose - Normal. Lips/teeth/gums - Normal. Tonsils - Normal. Oropharynx - Normal.   Ears Normal Inspection - Right: Normal, Left: Normal. Canal - Right: Normal, Left: Normal. TM - Right: Normal, Left: Normal.   Skin Normal Inspection - Normal.        Lymph Detail Normal Submental. Submandibular. Anterior cervical. Posterior cervical. Supraclavicular.     Procedure  Debridement of left scalp wound  Consent obtained.  Local anesthetic infiltrated around the bolt.  Wound debrided with forceps and scissors.  Sent for culture.  Quarter inch iodoform gauze soaked in Betadine placed circumferentially around the both under the skin.  Patient tolerated this well.    Results:     Laboratory Data:  Lab Results   Component Value Date    WBC 7.7 12/31/2023    HGB 11.8 (L) 12/31/2023    HCT 34.4 (L) 12/31/2023    .0 12/31/2023    CREATSERUM 1.47 (H) 12/30/2023    BUN 32 (H) 12/30/2023     (L) 12/30/2023    K 5.2 (H) 12/30/2023     12/30/2023    CO2 26.0 12/30/2023     (H) 12/30/2023    CA 9.3 12/30/2023    ALB 3.4 04/18/2023    ALKPHO 46 04/18/2023    TP 6.4 04/18/2023    AST 30 04/18/2023    ALT 53 04/18/2023     07/28/2020         Imaging:  CT BRAIN OR HEAD ([id number])    Result Date: 12/30/2023  PROCEDURE: CT BRAIN OR HEAD (CPT=70450)  COMPARISON: [facility], CT BRAIN  OR HEAD (CPT=70450), 4/28/2023, 2:19 PM.  [facility], CT BRAIN OR HEAD (CPT=70450), 9/11/2019, 4:32 PM.  INDICATIONS: Abscess adjacent to the left cochlear implant.  TECHNIQUE: CT images were obtained without contrast material. Automated exposure control for dose reduction was used. Dose information was transmitted to the ACR (American College of Radiology) NRDR (National Radiology Data Registry), which includes the Dose Index Registry.   FINDINGS:  CSF SPACES: The ventricles, cisterns, and sulci are dilated, but remain commensurate in caliber, consistent with parenchymal volume loss of a degree that is appropriate for age. No hydrocephalus, subarachnoid hemorrhage, or effacement of the basal cisterns is appreciated. There is no extra-axial fluid collection. CEREBRUM: No acute intraparenchymal hemorrhage, edema, or cortical sulcal effacement is apparent. There is no space-occupying lesion, mass effect, or shift of midline structures. The gray-white matter junction is preserved and bilaterally symmetric in appearance.  Minimal periventricular and subcortical white matter hypodensities are present, consistent with chronic microvascular ischemic disease. CEREBELLUM: No edema, hemorrhage, mass, or acute infarction is seen. There is cerebellar folial expansion consistent with atrophy.  BRAINSTEM: No edema, hemorrhage, mass, or acute infarction is seen. There is commensurate atrophy.  CALVARIUM: Postoperative changes of bilateral mastoidectomy are noted.  A metallic structure is imbedded in the left parietal region, compatible with reported history of implant. There is no apparent depressed fracture, mass, or other significant visible  lesion.  SINUSES: Limited views demonstrate scattered mucosal thickening of the ethmoid air cells and involving the sphenoethmoidal recesses.  ORBITS: Limited views are grossly unremarkable.   OTHER: Scalp soft tissue swelling and scalp infiltration are seen  adjacent to the left parietal metallic implant. However, within the parameters of this noncontrast exam, no discrete loculated, drainable fluid collection is seen.   Atherosclerotic vascular calcifications are perceived in the cavernous carotid and distal vertebral arteries. Fluid is seen surrounding the left-sided ossicles. Probable cerumen is present in the right external auditory canal. A left temporalis intramuscular lipoma measures up to 0.7 x 1.1 x 1.8 cm. Extensive dental amalgam is seen on the  view.           CONCLUSION:  1. No acute intracranial process by noncontrast CT technique.  2. Infiltration of the left parieto-occipital scalp soft tissues without clear evidence of loculated, drainable fluid collection.  3. Senescent changes of parenchymal volume loss with sequela of chronic microvascular ischemic disease.  4. There is large vessel atherosclerosis of the anterior and posterior circulations.  5. Postoperative changes of mastoidectomy are demonstrated. There may be effusion of the left middle ear, and correlation for potential otitis is recommended.  6. Lesser incidental findings as above.      Dictated by (CST): Gee Marcano MD on 12/30/2023 at 5:51 PM     Finalized by (CST): Gee Marcano MD on 12/30/2023 at 5:56 PM              Impression/Recommendations:   68-year-old with an infection of a left bone-anchored hearing aid in the setting of elevated glucose.  Has been ongoing for the last 3 weeks and has failed outpatient treatment.    CT scan did not demonstrate any abscess just soft tissue thickening.    Posterior to the left ear is the bone-anchored hearing aid bolt.  Surrounding this is indurated and erythematous and tender skin.  At the superior anterior margin there was crusting and a small amount of mucoid purulence with an opening under the skin possibly from the previous incision site.  Mild overlying cellulitis.  Small rim of granulation tissue around the Bolz.  I debrided this  crusting and mucus and sent this for culture.  I placed Betadine soaked iodoform gauze under the skin circumferentially around the bolt.  There is no abscess or fluctuance of the area.    Recommendations  -This is a bone-anchored hearing aid which is a bolt in the skull that sends vibrations to the inner ear and not a cochlear implant.  It is currently infected in the setting of elevated glucose.  He cannot hear from his right ear and so he depends on this device to hear so we will do our best to salvage this although it may need to eventually be removed by the implanting surgeon which is Dr Novak of [facility].  I debrided the area and sent the material for aerobic and anaerobic cultures and sensitivities.  I placed a Betadine soaked iodoform gauze circumferentially around the bolts under the skin.  Will change this once or twice a day.  Should continue IV antibiotics and tight glucose control.  Will continue to follow.  -Please call with any questions    All questions and concerns were addressed. We appreciate the opportunity to participate in the care of this patient. Please do not hesitate to call our office ([phone]) with any issues.  A total of 75 minutes were spent in the care of this patient More than 50% of this time was spent face to face    This note was partially prepared using Dragon Medical voice recognition dictation software. As a result, errors may occur. When identified, these errors have been corrected. While every attempt is made to correct errors during dictation, discrepancies may still exist   Julio Cesar Nguyen MD  12/31/2023

## 2023-12-31 NOTE — PLAN OF CARE
Samy complained of pain during shift, medications given as charted. Call light placed within reach at all times with bed locked in lowest position and bed alarm set. Patient called appropriately and was rounded on frequently.    Problem: Patient Centered Care  Goal: Patient preferences are identified and integrated in the patient's plan of care  Description: Interventions:  - What would you like us to know as we care for you? From home with wife. 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  - Provide timely, complete, and accurate information to patient/family  - Incorporate patient and family knowledge, values, beliefs, and cultural backgrounds into the planning and delivery of care  - Encourage patient/family to participate in care and decision-making at the level they choose  - Honor patient and family perspectives and choices  Outcome: Progressing     Problem: Diabetes/Glucose Control  Goal: Glucose maintained within prescribed range  Description: INTERVENTIONS:  - Monitor Blood Glucose as ordered  - Assess for signs and symptoms of hyperglycemia and hypoglycemia  - Administer ordered medications to maintain glucose within target range  - Assess barriers to adequate nutritional intake and initiate nutrition consult as needed  - Instruct patient on self management of diabetes  Outcome: Progressing     Problem: Patient/Family Goals  Goal: Patient/Family Long Term Goal  Description: Patient's Long Term Goal: To go home    Interventions:  - Medications as scheduled  - Tests per MD  - Frequent rounding  - See additional Care Plan goals for specific interventions  Outcome: Progressing  Goal: Patient/Family Short Term Goal  Description: Patient's Short Term Goal: Pain control    Interventions:   - PRN pain medications  - ENT consult  - See additional Care Plan goals for specific interventions  Outcome: Progressing     Problem: SKIN/TISSUE INTEGRITY - ADULT  Goal: Skin integrity remains intact  Description: INTERVENTIONS  - Assess and  document risk factors for pressure ulcer development  - Assess and document skin integrity  - Monitor for areas of redness and/or skin breakdown  - Initiate interventions, skin care algorithm/standards of care as needed  Outcome: Progressing  Goal: Incision(s), wounds(s) or drain site(s) healing without S/S of infection  Description: INTERVENTIONS:  - Assess and document risk factors for pressure ulcer development  - Assess and document skin integrity  - Assess and document dressing/incision, wound bed, drain sites and surrounding tissue  - Implement wound care per orders  - Initiate isolation precautions as appropriate  - Initiate Pressure Ulcer prevention bundle as indicated  Outcome: Progressing

## 2023-12-31 NOTE — CONSULTS
[facility] ID CONSULT NOTE    Samy Hoskins Patient Status:  Inpatient    1955 MRN [id number]   Location [facility] 5SW/SE Attending Leatha Marie MD   Hosp Day # 1 PCP Kaleb Marie MD       Reason for Consultation:  Scalp infection in the setting of hardware    Antibiotics: IV vanc, cefepime    ASSESSMENT:    # L scalp wound infection in the setting of embedded hardware  - has bone-anchored hearing aid placed few years ago  - CT neg for abscess  - ENT following. S/p I&D, wound cx pending    PLAN:    -  continue IV vanc anc cefepime for now.   -  F/u OR cx results  -  appreciate ENT recs.  -  Follow fever curve, wbc  -  Reviewed labs, micro, imaging reports, available old records    D/w pt and family that wound healing will be challenging given hardware in place. If infection does not resolve, will need to have hearing aid removed.    Thank you for allowing us to participate in the care of this patient. Please do not hesitate to call if you have any questions.   We will continue to follow with you and will make further recommendations based on his progress.    History of Present Illness:  Samy Hoskins is a a(n) 68 year old male with HTN, HLD, DMII, deafness s/p L ear hearing aid implant 2-3years ago who presented to ED on 23 with L sided scalp pain, headache, dizziness and nausea. Symptoms have been ongoing for several months but has gotten worse. Was prescribed Bactrim by his PCP and was recently seen in ED on . Was prescribed cipro and clinda but symptoms did not improve. Denies any fever at home.     History:  Past Medical History:   Diagnosis Date    Atherosclerosis of coronary artery     Deaf, right     Diabetes (HCC)     Essential hypertension     Hyperlipidemia     Thyroid disease      Past Surgical History:   Procedure Laterality Date    CORONARY STENT PLACEMENT      ROTATOR CUFF REPAIR Left      History reviewed. No pertinent family history.   reports  that he has quit smoking. He has never been exposed to tobacco smoke. He has never used smokeless tobacco. He reports that he does not currently use alcohol. He reports that he does not use drugs.    Allergies:  Allergies   Allergen Reactions    Penicillins SWELLING    Shrimp OTHER (SEE COMMENTS)     Throat tightness       Medications:    Current Facility-Administered Medications:     ceFEPIme (Maxipime) 1 g in sodium chloride 0.9% 100 mL IVPB-MBP, 1 g, Intravenous, Q8H    insulin aspart (NovoLOG) 100 Units/mL FlexPen 18 Units, 18 Units, Subcutaneous, TID CC    insulin detemir (Levemir) 100 UNIT/ML FlexPen/FlexTouch 55 Units, 55 Units, Subcutaneous, Nightly    vancomycin (Vancocin) 1.5 g in sodium chloride 0.9% 250mL IVPB premix, 15 mg/kg, Intravenous, Q24H    glucose (Dex4) 15 GM/59ML oral liquid 15 g, 15 g, Oral, Q15 Min PRN **OR** glucose (Glutose) 40% oral gel 15 g, 15 g, Oral, Q15 Min PRN **OR** glucose-vitamin C (Dex-4) chewable tab 4 tablet, 4 tablet, Oral, Q15 Min PRN **OR** dextrose 50% injection 50 mL, 50 mL, Intravenous, Q15 Min PRN **OR** glucose (Dex4) 15 GM/59ML oral liquid 30 g, 30 g, Oral, Q15 Min PRN **OR** glucose (Glutose) 40% oral gel 30 g, 30 g, Oral, Q15 Min PRN **OR** glucose-vitamin C (Dex-4) chewable tab 8 tablet, 8 tablet, Oral, Q15 Min PRN    insulin aspart (NovoLOG) 100 Units/mL FlexPen 1-5 Units, 1-5 Units, Subcutaneous, TID CC    amLODIPine (Norvasc) tab 10 mg, 10 mg, Oral, Daily    aspirin DR tab 81 mg, 81 mg, Oral, Daily    ezetimibe (Zetia) tab 10 mg, 10 mg, Oral, Nightly    furosemide (Lasix) tab 40 mg, 40 mg, Oral, BID    HYDROcodone-acetaminophen (Norco) 5-325 MG per tab 1 tablet, 1 tablet, Oral, Q6H PRN    isosorbide mononitrate ER (Imdur) 24 hr tab 30 mg, 30 mg, Oral, Daily    levothyroxine (Synthroid) tab 125 mcg, 125 mcg, Oral, Before breakfast    losartan (Cozaar) tab 25 mg, 25 mg, Oral, Daily    rosuvastatin (Crestor) tab 20 mg, 20 mg, Oral, Nightly    heparin (Porcine) 5000  UNIT/ML injection 5,000 Units, 5,000 Units, Subcutaneous, Q8H REJI    influenza vaccine high dose quad (Fluzone QIV HD) 0.7 mL IM injection (ages >/= 65 years) 0.7 mL, 0.7 mL, Intramuscular, Prior to discharge    Review of Systems:  CONSTITUTIONAL:  No weight loss, weakness or fatigue.  HEENT:  Eyes:  No visual loss, blurred vision, double vision or yellow sclerae. Ears, Nose, Throat:  L scalp area pain around hearing aid site.   SKIN:  No rash or itching.  CARDIOVASCULAR:  No chest pain, chest pressure or chest discomfort  RESPIRATORY:  No shortness of breath, cough or sputum.  GASTROINTESTINAL:  No anorexia, nausea, vomiting or diarrhea. No abdominal pain or blood.  GENITOURINARY:  No Burning on urination.   NEUROLOGICAL: +headache, dizziness  MUSCULOSKELETAL:  No muscle, back pain, joint pain or stiffness.    Physical Exam:  Vital signs: Blood pressure 126/72, pulse 58, temperature 98 °F (36.7 °C), temperature source Oral, resp. rate 18, weight 212 lb 1.3 oz (96.2 kg), SpO2 97%.    General: Alert, oriented, NAD  HEENT: Moist mucous membranes. EOMI. L scalp area with hearing aid in place. Area is tender on palpation.  Neck: No lymphadenopathy.  Supple.  Cardiovascular: RRR  Respiratory: Clear to auscultation bilaterally.  No wheezes. No rhonchi.  Abdomen: Soft, nontender, nondistended.   Musculoskeletal: No edema noted  Integument: No lesions. No erythema.    Laboratory Data:  Recent Labs   Lab 12/31/23  0557   RBC 4.22   HGB 11.8*   HCT 34.4*   MCV 81.5   MCH 28.0   MCHC 34.3   RDW 13.1   NEPRELIM 4.88   WBC 7.7   .0     Recent Labs   Lab 12/30/23  1623   *   BUN 32*   CREATSERUM 1.47*   CA 9.3   *   K 5.2*      CO2 26.0       Microbiology: Reviewed in EMR    Radiology: Reviewed      MD Shashank [facility]  [phone]  12/31/2023

## 2023-12-31 NOTE — H&P
[facility]    History and Physical    Samy Hoskins Patient Status:  Inpatient    1955 MRN [id number]   Location [facility] 5SW/SE Attending Leatha Marie MD   Hosp Day # 1 PCP Kaleb Marie MD     Date:  2023  Date of Admission:  2023    History provided by:patient  HPI:     Chief Complaint   [facility]    Abs[facility]     [facility], drainage and swelling around the site of a cochlear implant on his left scalp.  He was seen 2 days ago, had some debridement performed and he was started on 2 different antibiotics but he states that he feels worse today than he did then with increasing pain, some mild dizziness and nausea.  Went to the patient this is ongoing for the last 2 to 3 weeks  History     Past Medical History:   Diagnosis Date    Atherosclerosis of coronary artery     Deaf, right     Diabetes (HCC)     Essential hypertension     Hyperlipidemia     Thyroid disease      Past Surgical History:   Procedure Laterality Date    CORONARY STENT PLACEMENT      ROTATOR CUFF REPAIR Left      History reviewed. No pertinent family history.  Social History:  Social History     Socioeconomic History    Marital status:    Tobacco Use    Smoking status: Former     Passive exposure: Never    Smokeless tobacco: Never   Vaping Use    Vaping Use: Never used   Substance and Sexual Activity    Alcohol use: Not Currently    Drug use: Never     Social Determinants of Health     Food Insecurity: No Food Insecurity (2023)    Food Insecurity     Food Insecurity: Never true   Transportation Needs: No Transportation Needs (2023)    Transportation Needs     Lack of Transportation: No   Housing Stability: Low Risk  (2023)    Housing Stability     Housing Instability: No     Allergies/Medications:   Allergies:   Allergies   Allergen Reactions    Penicillins SWELLING    Shrimp OTHER (SEE COMMENTS)     Throat  tightness     Medications Prior to Admission   Medication Sig    ciprofloxacin 500 MG Oral Tab Take 1 tablet (500 mg total) by mouth 2 (two) times daily for 10 days.    clindamycin 300 MG Oral Cap Take 1 capsule (300 mg total) by mouth 3 (three) times daily for 10 days.    HYDROcodone-acetaminophen 5-325 MG Oral Tab Take 1 tablet by mouth every 6 (six) hours as needed for Pain.    metFORMIN HCl 1000 MG Oral Tab Take 1 tablet (1,000 mg total) by mouth 2 (two) times daily with meals.    levothyroxine 125 MCG Oral Tab Take 1 tablet (125 mcg total) by mouth before breakfast.    rosuvastatin 20 MG Oral Tab Take 1 tablet (20 mg total) by mouth nightly.    aspirin 81 MG Oral Tab EC Take 1 tablet (81 mg total) by mouth daily.    isosorbide mononitrate ER 30 MG Oral Tablet 24 Hr Take 1 tablet (30 mg total) by mouth daily.    ezetimibe 10 MG Oral Tab Take 1 tablet (10 mg total) by mouth nightly.    amLODIPine 10 MG Oral Tab Take 1 tablet (10 mg total) by mouth daily.    losartan 25 MG Oral Tab Take 1 tablet (25 mg total) by mouth daily.    furosemide 40 MG Oral Tab Take 1 tablet (40 mg total) by mouth 2 (two) times daily.    Cholecalciferol (VITAMIN D3) 1.25 MG (63239 UT) Oral Cap Take 1 capsule by mouth once a week.    insulin glargine 100 UNIT/ML Subcutaneous Solution Inject 60 Units into the skin nightly.    insulin lispro (HUMALOG) 100 UNIT/ML Subcutaneous Solution Inject 20 Units into the skin in the morning, at noon, and at bedtime.       Review of Systems:     Constitutional: Negative.    HENT: Negative.     Eyes: Negative.    Respiratory: Negative.     Cardiovascular: Negative.    Gastrointestinal: Negative.    Genitourinary: Negative.    Musculoskeletal: Negative.    Neurological:         Tenderness and pain on the left scalp   Hematological: Negative.    Psychiatric/Behavioral: Negative.         Physical Exam:   Vital Signs:  Blood pressure 123/70, pulse 57, temperature 98.1 °F (36.7 °C), temperature source Oral,  resp. rate 18, weight 212 lb 1.3 oz (96.2 kg), SpO2 96%.  Physical Exam  Vitals and nursing note reviewed.   Constitutional:       Appearance: Normal appearance.   HENT:      Head: Normocephalic and atraumatic.   Cardiovascular:      Rate and Rhythm: Normal rate and regular rhythm.      Pulses: Normal pulses.      Heart sounds: Normal heart sounds.   Pulmonary:      Effort: Pulmonary effort is normal.      Breath sounds: Normal breath sounds.   Abdominal:      Palpations: Abdomen is soft.   Musculoskeletal:         General: Normal range of motion.      Cervical back: Normal range of motion and neck supple.      Comments: Tenderness on the left side of the scalp , above the ear   Skin:     General: Skin is warm.   Neurological:      Mental Status: He is alert. Mental status is at baseline.   Psychiatric:         Mood and Affect: Mood normal.           Results:     Lab Results   Component Value Date    WBC 7.7 12/31/2023    HGB 11.8 (L) 12/31/2023    HCT 34.4 (L) 12/31/2023    .0 12/31/2023    CREATSERUM 1.47 (H) 12/30/2023    BUN 32 (H) 12/30/2023     (L) 12/30/2023    K 5.2 (H) 12/30/2023     12/30/2023    CO2 26.0 12/30/2023     (H) 12/30/2023    CA 9.3 12/30/2023    ALB 3.4 04/18/2023    ALKPHO 46 04/18/2023    BILT 0.2 04/18/2023    TP 6.4 04/18/2023    AST 30 04/18/2023    ALT 53 04/18/2023     07/28/2020     CT BRAIN OR HEAD (15939)    Result Date: 12/30/2023  CONCLUSION:  1. No acute intracranial process by noncontrast CT technique.  2. Infiltration of the left parieto-occipital scalp soft tissues without clear evidence of loculated, drainable fluid collection.  3. Senescent changes of parenchymal volume loss with sequela of chronic microvascular ischemic disease.  4. There is large vessel atherosclerosis of the anterior and posterior circulations.  5. Postoperative changes of mastoidectomy are demonstrated. There may be effusion of the left middle ear, and correlation for  potential otitis is recommended.  6. Lesser incidental findings as above.      Dictated by (CST): Gee Marcano MD on 12/30/2023 at 5:51 PM     Finalized by (CST): Gee Marcano MD on 12/30/2023 at 5:56 PM               Assessment/Plan:     Cellulitis of head or scalp  Seen by ENT, continue with IV antibiotic, pain medication as  Needed  Hypertension monitor blood pressure, continue with current medication    Diabetes type-on insulin continue with Accu-Chek sliding scale insulin, hypoglycemia protocol    Hypercholesterolemia continue with home medication    Cochlear implant in place he will need to follow-up with his ENT surgeon    GI and DVT prophylax    Patient will require inpatient services that will reasonably be expected to span two midnight's based on the clinical documentation in H+P.   Based on patients current state of illness, I anticipate that, after discharge, patient will require TBD.        LLOYD GARRETT MD  12/31/2023

## 2023-12-31 NOTE — PLAN OF CARE
Patient is a new admit from the ED, AxO x 4 currently on RA. IV antibiotic given overnight. PRN Norco given for pain around ear. No acute changes.    Problem: Patient Centered Care  Goal: Patient preferences are identified and integrated in the patient's plan of care  Description: Interventions:  - What would you like us to know as we care for you? From home with wife.  - Provide timely, complete, and accurate information to patient/family  - Incorporate patient and family knowledge, values, beliefs, and cultural backgrounds into the planning and delivery of care  - Encourage patient/family to participate in care and decision-making at the level they choose  - Honor patient and family perspectives and choices  Outcome: Progressing     Problem: Diabetes/Glucose Control  Goal: Glucose maintained within prescribed range  Description: INTERVENTIONS:  - Monitor Blood Glucose as ordered  - Assess for signs and symptoms of hyperglycemia and hypoglycemia  - Administer ordered medications to maintain glucose within target range  - Assess barriers to adequate nutritional intake and initiate nutrition consult as needed  - Instruct patient on self management of diabetes  Outcome: Progressing     Problem: Patient/Family Goals  Goal: Patient/Family Long Term Goal  Description: Patient's Long Term Goal: To go home    Interventions:  - Medications as scheduled  - Tests per MD  - Frequent rounding  - See additional Care Plan goals for specific interventions  Outcome: Progressing  Goal: Patient/Family Short Term Goal  Description: Patient's Short Term Goal: Pain control    Interventions:   - PRN pain medications  - ENT consult  - See additional Care Plan goals for specific interventions  Outcome: Progressing     Problem: SKIN/TISSUE INTEGRITY - ADULT  Goal: Skin integrity remains intact  Description: INTERVENTIONS  - Assess and document risk factors for pressure ulcer development  - Assess and document skin integrity  - Monitor for  areas of redness and/or skin breakdown  - Initiate interventions, skin care algorithm/standards of care as needed  Outcome: Progressing  Goal: Incision(s), wounds(s) or drain site(s) healing without S/S of infection  Description: INTERVENTIONS:  - Assess and document risk factors for pressure ulcer development  - Assess and document skin integrity  - Assess and document dressing/incision, wound bed, drain sites and surrounding tissue  - Implement wound care per orders  - Initiate isolation precautions as appropriate  - Initiate Pressure Ulcer prevention bundle as indicated  Outcome: Progressing

## 2023-12-31 NOTE — ED QUICK NOTES
Orders for admission, patient is aware of plan and ready to go upstairs. Any questions, please call ED RN Lucero at extension [phone].     Patient Covid vaccination status: Fully vaccinated     COVID Test Ordered in ED: None    COVID Suspicion at Admission: N/A    Running Infusions:  None    Mental Status/LOC at time of transport: AOx3    Other pertinent information:   CIWA score: N/A   NIH score:  N/A

## 2024-01-01 LAB
GLUCOSE BLDC GLUCOMTR-MCNC: 164 MG/DL (ref 70–99)
GLUCOSE BLDC GLUCOMTR-MCNC: 300 MG/DL (ref 70–99)
GLUCOSE BLDC GLUCOMTR-MCNC: 305 MG/DL (ref 70–99)

## 2024-01-01 PROCEDURE — 99232 SBSQ HOSP IP/OBS MODERATE 35: CPT | Performed by: STUDENT IN AN ORGANIZED HEALTH CARE EDUCATION/TRAINING PROGRAM

## 2024-01-01 PROCEDURE — 99232 SBSQ HOSP IP/OBS MODERATE 35: CPT | Performed by: INTERNAL MEDICINE

## 2024-01-01 RX ORDER — HYDROCODONE BITARTRATE AND ACETAMINOPHEN 5; 325 MG/1; MG/1
1 TABLET ORAL ONCE
Status: COMPLETED | OUTPATIENT
Start: 2024-01-01 | End: 2024-01-01

## 2024-01-01 NOTE — PROGRESS NOTES
[facility]    Progress Note    Samy Hoskins Patient Status:  Inpatient    1955 MRN [id number]   Location [facility] 5SW/SE Attending Leatha Marie MD   Hosp Day # 2 PCP Kaleb Marie MD     Chief Complaint: Scalp infection    Subjective:     All other systems reviewed and are negative.      Objective:   Blood pressure 117/56, pulse 59, temperature 97.7 °F (36.5 °C), temperature source Oral, resp. rate 18, weight 212 lb 1.3 oz (96.2 kg), SpO2 98%.  Physical Exam  HENT:      Head:      Comments: Left postauricular implant with surrounding tenderness, no skin changes or necrosis or fluctuance. Packing removed and replaced. Granulation tissue remains at posterior inferior aspect.         Results:   Lab Results   Component Value Date    WBC 7.7 2023    HGB 11.8 (L) 2023    HCT 34.4 (L) 2023    .0 2023    CREATSERUM 1.47 (H) 2023    BUN 32 (H) 2023     (L) 2023    K 5.2 (H) 2023     2023    CO2 26.0 2023     (H) 2023    CA 9.3 2023    ALB 3.4 2023    ALKPHO 46 2023    BILT 0.2 2023    TP 6.4 2023    AST 30 2023    ALT 53 2023     2020       CT BRAIN OR HEAD ([id number])    Result Date: 2023  CONCLUSION:  1. No acute intracranial process by noncontrast CT technique.  2. Infiltration of the left parieto-occipital scalp soft tissues without clear evidence of loculated, drainable fluid collection.  3. Senescent changes of parenchymal volume loss with sequela of chronic microvascular ischemic disease.  4. There is large vessel atherosclerosis of the anterior and posterior circulations.  5. Postoperative changes of mastoidectomy are demonstrated. There may be effusion of the left middle ear, and correlation for potential otitis is recommended.  6. Lesser incidental findings as above.      Dictated by (CST): Gee Marcano MD on 2023 at 5:51 PM      Finalized by (CST): Gee Marcano MD on 12/30/2023 at 5:56 PM               Assessment & Plan:     Cellulitis of head or scalp  68-year-old with an infection of a left bone-anchored hearing aid in the setting of elevated glucose.  Has been ongoing for the last 3 weeks and has failed outpatient treatment.     CT scan did not demonstrate any abscess just soft tissue thickening.     Improving though . Packing removed and replaced.      Recommendations  -This is a bone-anchored hearing aid which is a bolt in the skull that sends vibrations to the inner ear and not a cochlear implant.  It is currently infected in the setting of elevated glucose.  He cannot hear from his right ear and so he depends on this device to hear so we will do our best to salvage this although it may need to eventually be removed by the implanting surgeon which is Dr Novak of [facility].    -Is improving though . Continue IV abx and packing changes. Packing changed this AM.   -May ask wound care to be involved when they return tomorrow for assistance in packing changes  -Aggressive glucose control  -Please call with any questions              Julio Cesar Nguyen MD  1/1/2024

## 2024-01-01 NOTE — PROGRESS NOTES
ED Culture Callback Results Review    Pharmacist reviewed culture results from ED visit . Final wound culture positive for bacteroidies. Patient was prescribed Ciprofloxacin (Cipro) and Clindamycin (Cleocin) on discharge. Current therapy is appropriate based on reported susceptibilities as discussed with Dr. Cameron Prabhakar. No further intervention required at this time.         Leana Massey, PharmD  Emergency Medicine Pharmacist Specialist  01/01/24; 2:05 PM

## 2024-01-01 NOTE — PLAN OF CARE
Patient vital signs stable. PRN Norco given for pain. IV antibiotic given overnight. No acute changes.    Problem: Patient Centered Care  Goal: Patient preferences are identified and integrated in the patient's plan of care  Description: Interventions:  - What would you like us to know as we care for you? From home with wife. 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  - Provide timely, complete, and accurate information to patient/family  - Incorporate patient and family knowledge, values, beliefs, and cultural backgrounds into the planning and delivery of care  - Encourage patient/family to participate in care and decision-making at the level they choose  - Honor patient and family perspectives and choices  Outcome: Progressing     Problem: Diabetes/Glucose Control  Goal: Glucose maintained within prescribed range  Description: INTERVENTIONS:  - Monitor Blood Glucose as ordered  - Assess for signs and symptoms of hyperglycemia and hypoglycemia  - Administer ordered medications to maintain glucose within target range  - Assess barriers to adequate nutritional intake and initiate nutrition consult as needed  - Instruct patient on self management of diabetes  Outcome: Progressing     Problem: Patient/Family Goals  Goal: Patient/Family Long Term Goal  Description: Patient's Long Term Goal: To go home    Interventions:  - Medications as scheduled  - Tests per MD  - Frequent rounding  - See additional Care Plan goals for specific interventions  Outcome: Progressing  Goal: Patient/Family Short Term Goal  Description: Patient's Short Term Goal: Pain control    Interventions:   - PRN pain medications  - ENT consult  - See additional Care Plan goals for specific interventions  Outcome: Progressing     Problem: SKIN/TISSUE INTEGRITY - ADULT  Goal: Skin integrity remains intact  Description: INTERVENTIONS  - Assess and document risk factors for pressure ulcer development  - Assess and document skin integrity  - Monitor for areas of redness  and/or skin breakdown  - Initiate interventions, skin care algorithm/standards of care as needed  Outcome: Progressing  Goal: Incision(s), wounds(s) or drain site(s) healing without S/S of infection  Description: INTERVENTIONS:  - Assess and document risk factors for pressure ulcer development  - Assess and document skin integrity  - Assess and document dressing/incision, wound bed, drain sites and surrounding tissue  - Implement wound care per orders  - Initiate isolation precautions as appropriate  - Initiate Pressure Ulcer prevention bundle as indicated  Outcome: Progressing

## 2024-01-01 NOTE — PROGRESS NOTES
[facility]    Progress Note    Samy Hoskins Patient Status:  Inpatient    1955 MRN [id number]   Location [facility] 5SW/SE Attending Leatha Marie MD   Hosp Day # 2 PCP Kaleb Marie MD     Chief Complaint: still has pain    Subjective:     Constitutional: Negative.    HENT: Negative.     Respiratory: Negative.     Cardiovascular: Negative.    Gastrointestinal: Negative.    Genitourinary: Negative.    Musculoskeletal: Negative.    Hematological: Negative.    Psychiatric/Behavioral: Negative.         Objective:   Blood pressure 117/56, pulse 59, temperature 97.7 °F (36.5 °C), temperature source Oral, resp. rate 18, weight 212 lb 1.3 oz (96.2 kg), SpO2 98%.  Physical Exam  Vitals and nursing note reviewed.   Constitutional:       Appearance: Normal appearance.   HENT:      Head: Normocephalic and atraumatic.   Cardiovascular:      Rate and Rhythm: Normal rate and regular rhythm.      Pulses: Normal pulses.      Heart sounds: Normal heart sounds.   Pulmonary:      Effort: Pulmonary effort is normal.      Breath sounds: Normal breath sounds.   Musculoskeletal:      Cervical back: Normal range of motion and neck supple.      Comments: Left cochlear implant   Skin:     General: Skin is warm.   Neurological:      Mental Status: He is alert.         Results:   Lab Results   Component Value Date    WBC 7.7 2023    HGB 11.8 (L) 2023    HCT 34.4 (L) 2023    .0 2023    CREATSERUM 1.47 (H) 2023    BUN 32 (H) 2023     (L) 2023    K 5.2 (H) 2023     2023    CO2 26.0 2023     (H) 2023    CA 9.3 2023    ALB 3.4 2023    ALKPHO 46 2023    BILT 0.2 2023    TP 6.4 2023    AST 30 2023    ALT 53 2023     2020       CT BRAIN OR HEAD (90269)    Result Date: 2023  CONCLUSION:  1. No acute intracranial process by noncontrast CT technique.  2. Infiltration of  the left parieto-occipital scalp soft tissues without clear evidence of loculated, drainable fluid collection.  3. Senescent changes of parenchymal volume loss with sequela of chronic microvascular ischemic disease.  4. There is large vessel atherosclerosis of the anterior and posterior circulations.  5. Postoperative changes of mastoidectomy are demonstrated. There may be effusion of the left middle ear, and correlation for potential otitis is recommended.  6. Lesser incidental findings as above.      Dictated by (CST): Gee Marcano MD on 12/30/2023 at 5:51 PM     Finalized by (CST): Gee Marcano MD on 12/30/2023 at 5:56 PM               Assessment & Plan:     Cellulitis of head or scalp  Seen by ENTs/p I @d, continue with IV antibiotic per id , pain medication as need.    Hypertension monitor blood pressure, continue with current medication     Diabetes type-on insulin continue with Accu-Chek sliding scale insulin, hypoglycemia protocol     Hypercholesterolemia continue with home medication     Cochlear implant in place he will need to follow-up with his ENT surgeon     GI and DVT prophylax    Patient will require inpatient services that will reasonably be expected to span two midnight's based on the clinical documentation in H+P.   Based on patients current state of illness, I anticipate that, after discharge, patient will require TBD.      LLOYD GARRETT MD  1/1/2024

## 2024-01-01 NOTE — PLAN OF CARE
Samy complained of pain during shift, medications given as charted. Cochlear implant was reassessed, cleaned and packed by ENT. Patient had call light within reach at all times with bed locked in lowest position. Patient rested comfortably in the bed and chair during shift and called appropriately.    Problem: Patient Centered Care  Goal: Patient preferences are identified and integrated in the patient's plan of care  Description: Interventions:  - What would you like us to know as we care for you? From home with wife. 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  - Provide timely, complete, and accurate information to patient/family  - Incorporate patient and family knowledge, values, beliefs, and cultural backgrounds into the planning and delivery of care  - Encourage patient/family to participate in care and decision-making at the level they choose  - Honor patient and family perspectives and choices  Outcome: Progressing     Problem: Diabetes/Glucose Control  Goal: Glucose maintained within prescribed range  Description: INTERVENTIONS:  - Monitor Blood Glucose as ordered  - Assess for signs and symptoms of hyperglycemia and hypoglycemia  - Administer ordered medications to maintain glucose within target range  - Assess barriers to adequate nutritional intake and initiate nutrition consult as needed  - Instruct patient on self management of diabetes  Outcome: Progressing     Problem: Patient/Family Goals  Goal: Patient/Family Long Term Goal  Description: Patient's Long Term Goal: To go home    Interventions:  - Medications as scheduled  - Tests per MD  - Frequent rounding  - See additional Care Plan goals for specific interventions  Outcome: Progressing  Goal: Patient/Family Short Term Goal  Description: Patient's Short Term Goal: Pain control    Interventions:   - PRN pain medications  - ENT consult  - See additional Care Plan goals for specific interventions  Outcome: Progressing     Problem: SKIN/TISSUE INTEGRITY -  ADULT  Goal: Skin integrity remains intact  Description: INTERVENTIONS  - Assess and document risk factors for pressure ulcer development  - Assess and document skin integrity  - Monitor for areas of redness and/or skin breakdown  - Initiate interventions, skin care algorithm/standards of care as needed  Outcome: Progressing  Goal: Incision(s), wounds(s) or drain site(s) healing without S/S of infection  Description: INTERVENTIONS:  - Assess and document risk factors for pressure ulcer development  - Assess and document skin integrity  - Assess and document dressing/incision, wound bed, drain sites and surrounding tissue  - Implement wound care per orders  - Initiate isolation precautions as appropriate  - Initiate Pressure Ulcer prevention bundle as indicated  Outcome: Progressing

## 2024-01-02 LAB
ANION GAP SERPL CALC-SCNC: 2 MMOL/L (ref 0–18)
BASOPHILS # BLD AUTO: 0.06 X10(3) UL (ref 0–0.2)
BASOPHILS NFR BLD AUTO: 0.7 %
BUN BLD-MCNC: 26 MG/DL (ref 9–23)
BUN/CREAT SERPL: 25.7 (ref 10–20)
CALCIUM BLD-MCNC: 9.6 MG/DL (ref 8.7–10.4)
CHLORIDE SERPL-SCNC: 107 MMOL/L (ref 98–112)
CO2 SERPL-SCNC: 29 MMOL/L (ref 21–32)
CREAT BLD-MCNC: 1.01 MG/DL
DEPRECATED RDW RBC AUTO: 38.5 FL (ref 35.1–46.3)
EGFRCR SERPLBLD CKD-EPI 2021: 82 ML/MIN/1.73M2 (ref 60–?)
EOSINOPHIL # BLD AUTO: 0.42 X10(3) UL (ref 0–0.7)
EOSINOPHIL NFR BLD AUTO: 5.1 %
ERYTHROCYTE [DISTWIDTH] IN BLOOD BY AUTOMATED COUNT: 13 % (ref 11–15)
GLUCOSE BLD-MCNC: 129 MG/DL (ref 70–99)
GLUCOSE BLDC GLUCOMTR-MCNC: 140 MG/DL (ref 70–99)
GLUCOSE BLDC GLUCOMTR-MCNC: 173 MG/DL (ref 70–99)
GLUCOSE BLDC GLUCOMTR-MCNC: 185 MG/DL (ref 70–99)
GLUCOSE BLDC GLUCOMTR-MCNC: 256 MG/DL (ref 70–99)
GLUCOSE BLDC GLUCOMTR-MCNC: 271 MG/DL (ref 70–99)
HCT VFR BLD AUTO: 38.5 %
HGB BLD-MCNC: 13 G/DL
IMM GRANULOCYTES # BLD AUTO: 0.02 X10(3) UL (ref 0–1)
IMM GRANULOCYTES NFR BLD: 0.2 %
LYMPHOCYTES # BLD AUTO: 1.87 X10(3) UL (ref 1–4)
LYMPHOCYTES NFR BLD AUTO: 22.7 %
MCH RBC QN AUTO: 27.7 PG (ref 26–34)
MCHC RBC AUTO-ENTMCNC: 33.8 G/DL (ref 31–37)
MCV RBC AUTO: 82.1 FL
MONOCYTES # BLD AUTO: 0.83 X10(3) UL (ref 0.1–1)
MONOCYTES NFR BLD AUTO: 10.1 %
NEUTROPHILS # BLD AUTO: 5.05 X10 (3) UL (ref 1.5–7.7)
NEUTROPHILS # BLD AUTO: 5.05 X10(3) UL (ref 1.5–7.7)
NEUTROPHILS NFR BLD AUTO: 61.2 %
OSMOLALITY SERPL CALC.SUM OF ELEC: 292 MOSM/KG (ref 275–295)
PLATELET # BLD AUTO: 202 10(3)UL (ref 150–450)
POTASSIUM SERPL-SCNC: 4 MMOL/L (ref 3.5–5.1)
RBC # BLD AUTO: 4.69 X10(6)UL
SODIUM SERPL-SCNC: 138 MMOL/L (ref 136–145)
WBC # BLD AUTO: 8.3 X10(3) UL (ref 4–11)

## 2024-01-02 PROCEDURE — 99232 SBSQ HOSP IP/OBS MODERATE 35: CPT | Performed by: INTERNAL MEDICINE

## 2024-01-02 RX ORDER — MORPHINE SULFATE 2 MG/ML
2 INJECTION, SOLUTION INTRAMUSCULAR; INTRAVENOUS ONCE
Status: COMPLETED | OUTPATIENT
Start: 2024-01-02 | End: 2024-01-02

## 2024-01-02 RX ORDER — BISACODYL 10 MG
10 SUPPOSITORY, RECTAL RECTAL ONCE AS NEEDED
Status: ACTIVE | OUTPATIENT
Start: 2024-01-02 | End: 2024-01-02

## 2024-01-02 RX ORDER — ONDANSETRON 2 MG/ML
4 INJECTION INTRAMUSCULAR; INTRAVENOUS EVERY 6 HOURS PRN
Status: DISCONTINUED | OUTPATIENT
Start: 2024-01-02 | End: 2024-01-05

## 2024-01-02 RX ORDER — POLYETHYLENE GLYCOL 3350 17 G/17G
17 POWDER, FOR SOLUTION ORAL DAILY PRN
Status: DISCONTINUED | OUTPATIENT
Start: 2024-01-02 | End: 2024-01-05

## 2024-01-02 RX ORDER — METRONIDAZOLE 500 MG/1
500 TABLET ORAL EVERY 8 HOURS SCHEDULED
Status: DISCONTINUED | OUTPATIENT
Start: 2024-01-02 | End: 2024-01-03

## 2024-01-02 RX ORDER — SENNOSIDES 8.6 MG
8.6 TABLET ORAL DAILY PRN
Status: DISCONTINUED | OUTPATIENT
Start: 2024-01-02 | End: 2024-01-05

## 2024-01-02 RX ORDER — AMOXICILLIN 500 MG/1
500 CAPSULE ORAL ONCE
Status: COMPLETED | OUTPATIENT
Start: 2024-01-02 | End: 2024-01-02

## 2024-01-02 NOTE — PLAN OF CARE
Patient is alert and oriented. L head/ear wound packed and dressing placed by wound RN. ID following. Tolerating diet and voiding freely. Pain controlled with PO and IV medications used with dressing change. Current plan to continue antibiotics, may require surgical management. Resting in bed, call light within reach, frequent rounds in place. Family at bedside.     Problem: Patient Centered Care  Goal: Patient preferences are identified and integrated in the patient's plan of care  Description: Interventions:  - What would you like us to know as we care for you? From home with wife. 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  - Provide timely, complete, and accurate information to patient/family  - Incorporate patient and family knowledge, values, beliefs, and cultural backgrounds into the planning and delivery of care  - Encourage patient/family to participate in care and decision-making at the level they choose  - Honor patient and family perspectives and choices  Outcome: Progressing     Problem: Diabetes/Glucose Control  Goal: Glucose maintained within prescribed range  Description: INTERVENTIONS:  - Monitor Blood Glucose as ordered  - Assess for signs and symptoms of hyperglycemia and hypoglycemia  - Administer ordered medications to maintain glucose within target range  - Assess barriers to adequate nutritional intake and initiate nutrition consult as needed  - Instruct patient on self management of diabetes  Outcome: Progressing     Problem: Patient/Family Goals  Goal: Patient/Family Long Term Goal  Description: Patient's Long Term Goal: To go home    Interventions:  - Medications as scheduled  - Tests per MD  - Frequent rounding  - See additional Care Plan goals for specific interventions  Outcome: Progressing  Goal: Patient/Family Short Term Goal  Description: Patient's Short Term Goal: Pain control    Interventions:   - PRN pain medications  - ENT consult  - See additional Care Plan goals for specific  interventions  Outcome: Progressing     Problem: SKIN/TISSUE INTEGRITY - ADULT  Goal: Skin integrity remains intact  Description: INTERVENTIONS  - Assess and document risk factors for pressure ulcer development  - Assess and document skin integrity  - Monitor for areas of redness and/or skin breakdown  - Initiate interventions, skin care algorithm/standards of care as needed  Outcome: Progressing  Goal: Incision(s), wounds(s) or drain site(s) healing without S/S of infection  Description: INTERVENTIONS:  - Assess and document risk factors for pressure ulcer development  - Assess and document skin integrity  - Assess and document dressing/incision, wound bed, drain sites and surrounding tissue  - Implement wound care per orders  - Initiate isolation precautions as appropriate  - Initiate Pressure Ulcer prevention bundle as indicated  Outcome: Progressing

## 2024-01-02 NOTE — PROGRESS NOTES
01/02/24 1128   Wound 12/30/23 Ear Left   Date First Assessed: 12/30/23   Present on Original Admission: Yes  Primary Wound Type: Old surgical  Location: Ear  Wound Location Orientation: Left  Wound Description (Comments): infected inplanted hearing aid   Wound Image    Site Assessment Moist;Fragile;Painful;Yellow;Red   Closure Not approximated   Drainage Amount Scant   Drainage Description Purulent   Treatments Cleansed;Saline   Dressing Iodoform gauze (packing)   Dressing Changed New   Wound Length (cm) 1 cm   Wound Width (cm) 1 cm   Wound Surface Area (cm^2) 1 cm^2   Wound Depth (cm) 0.3 cm  (POSSIBLY DEEPER, PT NOT TOLERATIN)   Wound Volume (cm^3) 0.3 cm^3   Non-staged Wound Description Full thickness   State of Healing Non-healing;Undermining   Wound Follow Up   Follow up needed Yes       WOUND CARE NOTE    History:  Past Medical History:   Diagnosis Date    Atherosclerosis of coronary artery     Deaf, right     Diabetes (HCC)     Essential hypertension     Hyperlipidemia     Thyroid disease      Past Surgical History:   Procedure Laterality Date    CORONARY STENT PLACEMENT      ROTATOR CUFF REPAIR Left       Social History     Socioeconomic History    Marital status:    Tobacco Use    Smoking status: Former     Passive exposure: Never    Smokeless tobacco: Never   Vaping Use    Vaping Use: Never used   Substance and Sexual Activity    Alcohol use: Not Currently    Drug use: Never     Social Determinants of Health     Food Insecurity: No Food Insecurity (12/30/2023)    Food Insecurity     Food Insecurity: Never true   Transportation Needs: No Transportation Needs (12/30/2023)    Transportation Needs     Lack of Transportation: No   Housing Stability: Low Risk  (12/30/2023)    Housing Stability     Housing Instability: No     PLAN   Recommendations:  Dr. ANDERSON ENT, ID currently on consult  Dietary consult for recommendations for nutrition to optimize wound healing    Wound(s)  Location: LEFT  EAR/SKULL  Cleansing  Saline   Dressings 1/4\" IODOFORM PACKING  Secure with BORDERED FOAM  Frequency DAILY     OBJECTIVE   MD Consult new  Reason for Consult      ASSESSMENT   Reddy Score:  Reddy Scale Score: 20    Chart Reviewed: yes    Wound(s):  ENT requested wound RN to assess/change the dressing to the left hear implanted device which now has a deep, painful wound surrounding-possible bone involvement? Bedside Rn administered iv Morphine for wound care. Pt unable to tolerate much exploration/packing even after pain meds given. 1/4\" iodoform gauze was tucked into wound, covered w bordered foam. Pt may need to have device removed, debridement of tissues/bone. The original procedure performed at another hospital.        Allergies: Penicillins and Shrimp    Labs:   Lab Results   Component Value Date    WBC 8.3 01/02/2024    HGB 13.0 01/02/2024    HCT 38.5 (L) 01/02/2024    .0 01/02/2024    CREATSERUM 1.01 01/02/2024    BUN 26 (H) 01/02/2024     01/02/2024    K 4.0 01/02/2024     01/02/2024    CO2 29.0 01/02/2024     (H) 01/02/2024    CA 9.6 01/02/2024    ALB 3.4 04/18/2023    ALKPHO 46 04/18/2023    BILT 0.2 04/18/2023    TP 6.4 04/18/2023    AST 30 04/18/2023    ALT 53 04/18/2023     07/28/2020     No results found for: \"PREALBUMIN\"      Time Spent 30 Minutes.    Charlene Recio, RN, BSN, [facility]  [facility]  [phone]

## 2024-01-02 NOTE — PROGRESS NOTES
[facility]    Progress Note    Samy Hoskins Patient Status:  Inpatient    1955 MRN [id number]   Location [facility] 5SW/SE Attending Leatha Marie MD   Hosp Day # 3 PCP Kaleb Marie MD     Chief Complaint: Scalp infection    Subjective:     All other systems reviewed and are negative.      Objective:   Blood pressure 126/72, pulse 58, temperature 98 °F (36.7 °C), temperature source Oral, resp. rate 16, weight 212 lb 1.3 oz (96.2 kg), SpO2 97%.  Physical Exam  HENT:      Head:      Comments: Left posterior auricular scalp still pretty tender for about 3 cm around the bolt. No skin changes or necrosis. Packing removed, tissue bleeding in wound, some purulence at posterior superior aspect        Results:   Lab Results   Component Value Date    WBC 7.7 2023    HGB 11.8 (L) 2023    HCT 34.4 (L) 2023    .0 2023    CREATSERUM 1.47 (H) 2023    BUN 32 (H) 2023     (L) 2023    K 5.2 (H) 2023     2023    CO2 26.0 2023     (H) 2023    CA 9.3 2023    ALB 3.4 2023    ALKPHO 46 2023    BILT 0.2 2023    TP 6.4 2023    AST 30 2023    ALT 53 2023     2020       No results found.        Assessment & Plan:     Cellulitis of head or scalp  68-year-old with an infection of a left bone-anchored hearing aid in the setting of elevated glucose.  Has been ongoing for the last 3 weeks and has failed outpatient treatment.     CT scan did not demonstrate any abscess just soft tissue thickening.     Still pretty tender around bolt.      Recommendations  -This is a bone-anchored hearing aid which is a bolt in the skull that sends vibrations to the inner ear and not a cochlear implant.  It is currently infected in the setting of elevated glucose.  He cannot hear from his right ear and so he depends on this device to hear so we will do our best to salvage this although it  may need to eventually be removed. Implanting surgeon which is Dr Novak of [facility].      -Still pretty tender around bolt, a little more so than would like after IV abx. Continues with elevated glucose. Osteomyelitis component? Need for PICC/ IV abx or transfer for removal of bolt if not improving? Could also consider debridement in OR but would like to see glucose controlled   -Wound care consult. Was packing with iodoform soaked in betadine for autodebridement     -Please call with any questions              Julio Cesar Nguyen MD  1/1/2024

## 2024-01-02 NOTE — PROGRESS NOTES
[facility]    Progress Note    Samy Hoskins Patient Status:  Inpatient    1955 MRN [id number]   Location [facility] 5SW/SE Attending Leatha Marie MD   Hosp Day # 3 PCP Kaleb Marie MD     Chief Complaint:     Subjective:     Constitutional: Negative.    HENT: Negative.          Pain to implant area    Respiratory: Negative.     Cardiovascular: Negative.    Gastrointestinal: Negative.    Genitourinary: Negative.    Musculoskeletal: Negative.    Hematological: Negative.    Psychiatric/Behavioral: Negative.         Objective:   Blood pressure 126/72, pulse 58, temperature 98 °F (36.7 °C), temperature source Oral, resp. rate 16, weight 212 lb 1.3 oz (96.2 kg), SpO2 97%.  Physical Exam  Vitals and nursing note reviewed.   HENT:      Head: Normocephalic and atraumatic.      Comments: As per ENT:Left posterior auricular scalp still pretty tender for about 3 cm around the bolt. No skin changes or necrosis. Packing removed, tissue bleeding in wound, some purulence at posterior superior aspect       Nose: Nose normal.      Mouth/Throat:      Mouth: Mucous membranes are moist.   Cardiovascular:      Rate and Rhythm: Normal rate.   Pulmonary:      Effort: Pulmonary effort is normal.      Breath sounds: Normal breath sounds.   Abdominal:      General: Abdomen is flat. Bowel sounds are normal.      Palpations: Abdomen is soft.   Musculoskeletal:         General: Normal range of motion.      Cervical back: Normal range of motion and neck supple.   Skin:     General: Skin is warm and dry.   Neurological:      Mental Status: He is alert and oriented to person, place, and time.   Psychiatric:         Mood and Affect: Mood normal.         Results:   Lab Results   Component Value Date    WBC 8.3 2024    HGB 13.0 2024    HCT 38.5 (L) 2024    .0 2024    CREATSERUM 1.01 2024    BUN 26 (H) 2024     2024    K 4.0 2024     2024    CO2  29.0 01/02/2024     (H) 01/02/2024    CA 9.6 01/02/2024    ALB 3.4 04/18/2023    ALKPHO 46 04/18/2023    BILT 0.2 04/18/2023    TP 6.4 04/18/2023    AST 30 04/18/2023    ALT 53 04/18/2023     07/28/2020       No results found.        Assessment & Plan:       Pt seen in room, feels about the same     Spoke with ID and ENT and recommending pt transfer to [facility] where the surgeon is that implanted it, sw on board,   Working on insurance verification     Continue with iv ab     Cellulitis of head or scalp  Seen by ENTs/p I @d, continue with IV antibiotic per id , pain medication as need.     Hypertension monitor blood pressure, continue with current medication     Diabetes type-on insulin continue with Accu-Chek sliding scale insulin, hypoglycemia protocol  Bs elevated, will adjust treatment, spoke with pt and nurse on strict diabetic diet      Hypercholesterolemia continue with home medication     Cochlear implant in place he will need to follow-up with his ENT surgeon     GI and DVT prophylax             Kaleb Marie MD  1/2/2024

## 2024-01-02 NOTE — PROGRESS NOTES
INFECTIOUS DISEASE PROGRESS NOTE  [facility] ID PROGRESS NOTE    Samy Hoskins Patient Status:  Inpatient    1955 MRN [id number]   Location [facility] 5SW/SE Attending Leatha Marie MD   Hosp Day # 3 PCP Kaleb Marie MD     Subjective:  ROS reviewed. On room air. Still with headache. Feels dizzy.    ASSESSMENT:    Antibiotics: Cefepime, flagyl  vancomycin     # L scalp wound infection in the setting of exposed hardware  - has bone-anchored hearing aid placed few years ago  - CT neg for abscess  - ENT following. S/p I&D, wound cx NGTD  -  Drainage swab Bacteroides     PLAN:  -  Stop vancomycin. Continue on cefepime but add flagyl. Will give dose of amoxicillin to see if he can tolerate. Consider unasyn.  -  Consider transfer back to [facility] for evaluation for hardware removal.  -  Follow fever curve, wbc.  -  Reviewed labs, micro, imaging reports, available old records.  -  Case d/w patient, primary, RN.     History of Present Illness:  Samy Hoskins is a 68 year old male with HTN, HLD, DMII, deafness s/p L ear hearing aid implant 2-3years ago who presented to ED on 23 with L sided scalp pain, headache, dizziness and nausea. Symptoms have been ongoing for several months but has gotten worse. Was prescribed Bactrim by his PCP and was recently seen in ED on . Was prescribed cipro and clinda but symptoms did not improve. Denies any fever at home.     Physical Exam:  /72 (BP Location: Right arm)   Pulse 58   Temp 98 °F (36.7 °C) (Oral)   Resp 16   Wt 212 lb 1.3 oz (96.2 kg)   SpO2 97%   BMI 29.58 kg/m²     Gen:   Awake, in chair  HEENT:  EOMI, L scalp with exposed hardware, neck supple  CV/lungs:  Regular rate and rhythm, CTAB  Abdom:  Soft, no TTP  Skin/extrem:  No rashes, no c/c/e  Lines:  PIV+    Laboratory Data: Reviewed    Microbiology: Reviewed    Radiology: Reviewed      [facility]  [phone]  1/2/2024

## 2024-01-02 NOTE — DIABETES ED
[facility]    Diabetes Education  Note    Samy Hoskins Patient Status:  Inpatient   1955 MRN [id number]  Location [facility] 5SW/SE Attending Leatha Marie MD  Hosp Day # 3 PCP Kaleb Marie MD      Labs:    HgbA1C (%)   Date Value   2023 9.7 (H)         Reason for Visit:Elevated A1c value  Met with patient and family members in room. Patient reports he has received education for diabetes previously at [facility].  He currently wears a Dexcom CGM and acknowledges elevated glucose values. He reports previous A1c value > 11%.  Patient educated regarding diabetes diet basics. Instructed on Balanced Plate method of meal planning. Handout given and initial meal plan provided. Encouraged to attend outpatient diabetes center for follow up diabetes education. Questions answered. Receptive to information.  He reports he has not been followed by his Diabetes doctor recently due to insurance however he hopes to return to see PCP in the future.       Education Provided:  Basic Diet Guidelines  Beginning carb counting - initial meal plan provided  Importance of good BG control to prevent short and long term complications  Importance of close follow up with PCP and medical team    Patient verbalized understanding and was receptive to information provided.      Recommendations:  Attend outpatient diabetes education          Nathaly Merritt RN  Diabetes Educator  2024  1:27 PM

## 2024-01-02 NOTE — CM/SW NOTE
01/02/24 1700   CM/SW Referral Data   Referral Source    Reason for Referral Discharge planning   Informant Spouse/Significant Other   Medical Hx   Does patient have an established PCP? Yes  (Kaleb Marie)   Patient Info   Patient's Current Mental Status at Time of Assessment Alert;Oriented   Patient Communication Issues Language barrier  (Paraguayan)   Patient's Home Environment House   Patient lives with Spouse/Significant other   Patient Status Prior to Admission   Independent with ADLs and Mobility Yes   Discharge Needs   Anticipated D/C needs Other     Pt discussed during nursing rounds. Dx left scalp infection from infected HA hardware. Home w/spouse, independent w/o device at baseline. Possible transfer to [facility] for removal of current HA, I&D of infected bone, and HA replacement. Transfer Center RN Zeny notified of possible transfer. Pt is listed as self pay but spouse provided CM with ins card which was sent to registration so it could be added to medical record. Wednesday SW will need to confirm ins information has been added to face sheet. Monitor    Plan: TBD    / to remain available for support and/or discharge planning.     GIN Rick    [phone]

## 2024-01-03 LAB
GLUCOSE BLDC GLUCOMTR-MCNC: 189 MG/DL (ref 70–99)
GLUCOSE BLDC GLUCOMTR-MCNC: 214 MG/DL (ref 70–99)
GLUCOSE BLDC GLUCOMTR-MCNC: 244 MG/DL (ref 70–99)
GLUCOSE BLDC GLUCOMTR-MCNC: 273 MG/DL (ref 70–99)

## 2024-01-03 PROCEDURE — 99232 SBSQ HOSP IP/OBS MODERATE 35: CPT | Performed by: INTERNAL MEDICINE

## 2024-01-03 PROCEDURE — 99232 SBSQ HOSP IP/OBS MODERATE 35: CPT | Performed by: STUDENT IN AN ORGANIZED HEALTH CARE EDUCATION/TRAINING PROGRAM

## 2024-01-03 RX ORDER — MORPHINE SULFATE 2 MG/ML
2 INJECTION, SOLUTION INTRAMUSCULAR; INTRAVENOUS ONCE
Status: COMPLETED | OUTPATIENT
Start: 2024-01-03 | End: 2024-01-03

## 2024-01-03 RX ORDER — MORPHINE SULFATE 2 MG/ML
2 INJECTION, SOLUTION INTRAMUSCULAR; INTRAVENOUS EVERY 2 HOUR PRN
Status: DISCONTINUED | OUTPATIENT
Start: 2024-01-03 | End: 2024-01-05

## 2024-01-03 RX ORDER — MORPHINE SULFATE 2 MG/ML
1 INJECTION, SOLUTION INTRAMUSCULAR; INTRAVENOUS EVERY 2 HOUR PRN
Status: DISCONTINUED | OUTPATIENT
Start: 2024-01-03 | End: 2024-01-05

## 2024-01-03 RX ORDER — MORPHINE SULFATE 4 MG/ML
4 INJECTION, SOLUTION INTRAMUSCULAR; INTRAVENOUS EVERY 2 HOUR PRN
Status: DISCONTINUED | OUTPATIENT
Start: 2024-01-03 | End: 2024-01-05

## 2024-01-03 RX ORDER — HYDROCODONE BITARTRATE AND ACETAMINOPHEN 5; 325 MG/1; MG/1
1 TABLET ORAL EVERY 4 HOURS PRN
Status: DISCONTINUED | OUTPATIENT
Start: 2024-01-03 | End: 2024-01-05

## 2024-01-03 NOTE — PLAN OF CARE
A/O x4, PRN Norco given twice for pain overnight. Patient c/o constipation despite having first BM in 4 days, Miralax given this morning. New IV placed. IV antibiotics continued. Call light within reach, non-slip socks on, frequent rounding done.    Problem: Patient Centered Care  Goal: Patient preferences are identified and integrated in the patient's plan of care  Description: Interventions:  - What would you like us to know as we care for you? From home with wife. 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  - Provide timely, complete, and accurate information to patient/family  - Incorporate patient and family knowledge, values, beliefs, and cultural backgrounds into the planning and delivery of care  - Encourage patient/family to participate in care and decision-making at the level they choose  - Honor patient and family perspectives and choices  Outcome: Progressing     Problem: Diabetes/Glucose Control  Goal: Glucose maintained within prescribed range  Description: INTERVENTIONS:  - Monitor Blood Glucose as ordered  - Assess for signs and symptoms of hyperglycemia and hypoglycemia  - Administer ordered medications to maintain glucose within target range  - Assess barriers to adequate nutritional intake and initiate nutrition consult as needed  - Instruct patient on self management of diabetes  Outcome: Progressing     Problem: Patient/Family Goals  Goal: Patient/Family Long Term Goal  Description: Patient's Long Term Goal: To go home    Interventions:  - Medications as scheduled  - Tests per MD  - Frequent rounding  - See additional Care Plan goals for specific interventions  Outcome: Progressing  Goal: Patient/Family Short Term Goal  Description: Patient's Short Term Goal: Pain control    Interventions:   - PRN pain medications  - ENT consult  - See additional Care Plan goals for specific interventions  Outcome: Progressing     Problem: SKIN/TISSUE INTEGRITY - ADULT  Goal: Skin integrity remains intact  Description:  INTERVENTIONS  - Assess and document risk factors for pressure ulcer development  - Assess and document skin integrity  - Monitor for areas of redness and/or skin breakdown  - Initiate interventions, skin care algorithm/standards of care as needed  Outcome: Progressing  Goal: Incision(s), wounds(s) or drain site(s) healing without S/S of infection  Description: INTERVENTIONS:  - Assess and document risk factors for pressure ulcer development  - Assess and document skin integrity  - Assess and document dressing/incision, wound bed, drain sites and surrounding tissue  - Implement wound care per orders  - Initiate isolation precautions as appropriate  - Initiate Pressure Ulcer prevention bundle as indicated  Outcome: Progressing

## 2024-01-03 NOTE — PROGRESS NOTES
[facility]    Progress Note    Samy Hoskins Patient Status:  Inpatient    1955 MRN [id number]   Location [facility] 5SW/SE Attending Leatha Marie MD   Hosp Day # 4 PCP Kaleb Marie MD     Chief Complaint: Scalp infection    Subjective:     All other systems reviewed and are negative.    Beginning to note improvement, less pain    Objective:   Blood pressure 134/68, pulse 57, temperature 98 °F (36.7 °C), temperature source Oral, resp. rate 16, weight 212 lb 1.3 oz (96.2 kg), SpO2 97%.  Physical Exam  HENT:      Head:      Comments: Able to touch around the bolt area now with much less tenderness. Wound packing in place. No purulence or skin changes        Results:   Lab Results   Component Value Date    WBC 8.3 2024    HGB 13.0 2024    HCT 38.5 (L) 2024    .0 2024    CREATSERUM 1.01 2024    BUN 26 (H) 2024     2024    K 4.0 2024     2024    CO2 29.0 2024     (H) 2024    CA 9.6 2024    ALB 3.4 2023    ALKPHO 46 2023    BILT 0.2 2023    TP 6.4 2023    AST 30 2023    ALT 53 2023     2020       No results found.        Assessment & Plan:     Cellulitis of head or scalp  68-year-old with an infection of a left bone-anchored hearing aid in the setting of elevated glucose.  Has been ongoing for the last 3 weeks and has failed outpatient treatment.     CT scan did not demonstrate any abscess just soft tissue thickening.     Beginning to improve, able to touch around the area now    Recommendations  -This is a bone-anchored hearing aid which is a bolt in the skull that sends vibrations to the inner ear and not a cochlear implant.  It is currently infected in the setting of elevated glucose.  He cannot hear from his right ear and so he depends on this device to hear so we will do our best to salvage this although it may need to eventually be  removed. Implanting surgeon which is Dr Novak of [facility].      -Beginning to improve which is encouraging. Can possibly avoid transfer if continues to improve with outpatient follow up regarding device and oral abx.   -Appreciate wound care assistance    -Please call with any questions              Julio Cesar Nguyen MD  1/1/2024

## 2024-01-03 NOTE — CM/SW NOTE
Received a call from PHYLLIS Taylor.  Pt needs to go back to [facility].  He is self pay however, pt's wife now has insurance.  PHYLLIS got the card, just waiting for registration to enter it and check auth.

## 2024-01-03 NOTE — PLAN OF CARE
Patient alert and oriented. IV abx. PRN pain meds as ordered. Awaiting insurance card from patient's wife. Family at bedside and updated on POC. Fall precautions in place, call light within reach.     Problem: Patient Centered Care  Goal: Patient preferences are identified and integrated in the patient's plan of care  Description: Interventions:  - What would you like us to know as we care for you? From home with wife. 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  - Provide timely, complete, and accurate information to patient/family  - Incorporate patient and family knowledge, values, beliefs, and cultural backgrounds into the planning and delivery of care  - Encourage patient/family to participate in care and decision-making at the level they choose  - Honor patient and family perspectives and choices  Outcome: Progressing     Problem: Diabetes/Glucose Control  Goal: Glucose maintained within prescribed range  Description: INTERVENTIONS:  - Monitor Blood Glucose as ordered  - Assess for signs and symptoms of hyperglycemia and hypoglycemia  - Administer ordered medications to maintain glucose within target range  - Assess barriers to adequate nutritional intake and initiate nutrition consult as needed  - Instruct patient on self management of diabetes  Outcome: Progressing     Problem: Patient/Family Goals  Goal: Patient/Family Long Term Goal  Description: Patient's Long Term Goal: To go home    Interventions:  - Medications as scheduled  - Tests per MD  - Frequent rounding  - See additional Care Plan goals for specific interventions  Outcome: Progressing  Goal: Patient/Family Short Term Goal  Description: Patient's Short Term Goal: Pain control    Interventions:   - PRN pain medications  - ENT consult  - See additional Care Plan goals for specific interventions  Outcome: Progressing     Problem: SKIN/TISSUE INTEGRITY - ADULT  Goal: Skin integrity remains intact  Description: INTERVENTIONS  - Assess and document risk factors for  pressure ulcer development  - Assess and document skin integrity  - Monitor for areas of redness and/or skin breakdown  - Initiate interventions, skin care algorithm/standards of care as needed  Outcome: Progressing  Goal: Incision(s), wounds(s) or drain site(s) healing without S/S of infection  Description: INTERVENTIONS:  - Assess and document risk factors for pressure ulcer development  - Assess and document skin integrity  - Assess and document dressing/incision, wound bed, drain sites and surrounding tissue  - Implement wound care per orders  - Initiate isolation precautions as appropriate  - Initiate Pressure Ulcer prevention bundle as indicated  Outcome: Progressing

## 2024-01-03 NOTE — PROGRESS NOTES
[facility]    Progress Note    Samy Hoskins Patient Status:  Inpatient    1955 MRN [id number]   Location [facility] 5SW/SE Attending Leatha Marie MD   Hosp Day # 4 PCP Kaleb Marie MD     Chief Complaint:     Subjective:     Constitutional: Negative.    HENT: Negative.          Pain to implant area improved today    Respiratory: Negative.     Cardiovascular: Negative.    Gastrointestinal: Negative.    Genitourinary: Negative.    Musculoskeletal: Negative.    Hematological: Negative.    Psychiatric/Behavioral: Negative.         Objective:   Blood pressure 108/63, pulse 57, temperature 98 °F (36.7 °C), temperature source Oral, resp. rate 16, weight 212 lb 1.3 oz (96.2 kg), SpO2 96%.  Physical Exam  Vitals and nursing note reviewed.   HENT:      Head: Normocephalic and atraumatic.      Comments: Pain today improved      Nose: Nose normal.      Mouth/Throat:      Mouth: Mucous membranes are moist.   Cardiovascular:      Rate and Rhythm: Normal rate.   Pulmonary:      Effort: Pulmonary effort is normal.      Breath sounds: Normal breath sounds.   Abdominal:      General: Abdomen is flat. Bowel sounds are normal.      Palpations: Abdomen is soft.   Musculoskeletal:         General: Normal range of motion.      Cervical back: Normal range of motion and neck supple.   Skin:     General: Skin is warm and dry.   Neurological:      Mental Status: He is alert and oriented to person, place, and time.   Psychiatric:         Mood and Affect: Mood normal.         Results:   Lab Results   Component Value Date    WBC 8.3 2024    HGB 13.0 2024    HCT 38.5 (L) 2024    .0 2024    CREATSERUM 1.01 2024    BUN 26 (H) 2024     2024    K 4.0 2024     2024    CO2 29.0 2024     (H) 2024    CA 9.6 2024    ALB 3.4 2023    ALKPHO 46 2023    BILT 0.2 2023    TP 6.4 2023    AST 30 2023     ALT 53 04/18/2023     07/28/2020       No results found.        Assessment & Plan:       Pt seen in room, feels better today, the are around the implant less tender      Will follow with id and ent      Continue with iv ab      Cellulitis of head or scalp  Seen by ENTs/p I @d, continue with IV antibiotic per id , pain medication as need.     Hypertension monitor blood pressure, continue with current medication     Diabetes type-on insulin continue with Accu-Chek sliding scale insulin, hypoglycemia protocol  Bs elevated, will adjust treatment, spoke with pt and nurse on strict diabetic diet       Hypercholesterolemia continue with home medication     Cochlear implant in place he will need to follow-up with his ENT surgeon     GI and DVT prophylax        Kaleb Marie MD  1/3/2024

## 2024-01-03 NOTE — PROGRESS NOTES
INFECTIOUS DISEASE PROGRESS NOTE  [facility] ID PROGRESS NOTE    Samy Hoskins Patient Status:  Inpatient    1955 MRN [id number]   Location [facility] 5SW/SE Attending Leatha Marie MD   Hosp Day # 4 PCP Kaleb Marie MD     Subjective:  ROS reviewed. On room air. Pain under better control.    ASSESSMENT:    Antibiotics: Unasyn  Vancomycin, cefepime, flagyl     # L scalp wound infection in the setting of exposed hardware  - has bone-anchored hearing aid placed few years ago  - CT neg for abscess  - ENT following. S/p I&D, wound cx NGTD  -  Drainage swab Bacteroides     PLAN:  -  Stop cefepime and flagyl and start unasyn. Consider course of PO augmentin on DC.  -  Patient to follow-up with own ENT physician.  -  Follow fever curve, wbc.  -  Reviewed labs, micro, imaging reports, available old records.  -  Case d/w patient, RN.     History of Present Illness:  Samy Hoskins is a 68 year old male with HTN, HLD, DMII, deafness s/p L ear hearing aid implant 2-3years ago who presented to ED on 23 with L sided scalp pain, headache, dizziness and nausea. Symptoms have been ongoing for several months but has gotten worse. Was prescribed Bactrim by his PCP and was recently seen in ED on . Was prescribed cipro and clinda but symptoms did not improve. Denies any fever at home.     Physical Exam:  /68 (BP Location: Left arm)   Pulse 57   Temp 98 °F (36.7 °C) (Oral)   Resp 16   Wt 212 lb 1.3 oz (96.2 kg)   SpO2 97%   BMI 29.58 kg/m²     Gen:   Awake, in chair  HEENT:  EOMI, L scalp with exposed hardware, packing in place, neck supple  CV/lungs:  Regular rate and rhythm, CTAB  Abdom:  Soft, no TTP  Skin/extrem:  No rashes, no c/c/e  Lines:  PIV+    Laboratory Data: Reviewed    Microbiology: Reviewed    Radiology: Reviewed      [facility]  [phone]  1/3/2024

## 2024-01-04 LAB
GLUCOSE BLDC GLUCOMTR-MCNC: 218 MG/DL (ref 70–99)
GLUCOSE BLDC GLUCOMTR-MCNC: 235 MG/DL (ref 70–99)
GLUCOSE BLDC GLUCOMTR-MCNC: 240 MG/DL (ref 70–99)
GLUCOSE BLDC GLUCOMTR-MCNC: 271 MG/DL (ref 70–99)

## 2024-01-04 PROCEDURE — 99232 SBSQ HOSP IP/OBS MODERATE 35: CPT | Performed by: INTERNAL MEDICINE

## 2024-01-04 PROCEDURE — 99231 SBSQ HOSP IP/OBS SF/LOW 25: CPT | Performed by: STUDENT IN AN ORGANIZED HEALTH CARE EDUCATION/TRAINING PROGRAM

## 2024-01-04 RX ORDER — AMOXICILLIN AND CLAVULANATE POTASSIUM 875; 125 MG/1; MG/1
1 TABLET, FILM COATED ORAL 2 TIMES DAILY
Qty: 28 TABLET | Refills: 0 | Status: SHIPPED | OUTPATIENT
Start: 2024-01-04 | End: 2024-01-18

## 2024-01-04 NOTE — CM/SW NOTE
SW followed up with pt's wife about insurance. Wife informed SW that she awaiting for Jarrod. SW called Jarrod at [employer]- [phone], left vm for him to follow up with pt's wife.         PHYLLIS/SONYA to remain available for support and/or discharge planning.     Page Cast, MSW, LSW   x [phone]

## 2024-01-04 NOTE — PLAN OF CARE
A/O x4, Norco given for pain. MD notified for increased pain; IV morphine ordered and given once overnight. Continues on IV Unasyn. Call light within reach, non-slip socks, frequent rounding done.    Problem: Patient Centered Care  Goal: Patient preferences are identified and integrated in the patient's plan of care  Description: Interventions:  - What would you like us to know as we care for you? From home with wife. 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  - Provide timely, complete, and accurate information to patient/family  - Incorporate patient and family knowledge, values, beliefs, and cultural backgrounds into the planning and delivery of care  - Encourage patient/family to participate in care and decision-making at the level they choose  - Honor patient and family perspectives and choices  Outcome: Progressing     Problem: Diabetes/Glucose Control  Goal: Glucose maintained within prescribed range  Description: INTERVENTIONS:  - Monitor Blood Glucose as ordered  - Assess for signs and symptoms of hyperglycemia and hypoglycemia  - Administer ordered medications to maintain glucose within target range  - Assess barriers to adequate nutritional intake and initiate nutrition consult as needed  - Instruct patient on self management of diabetes  Outcome: Progressing     Problem: Patient/Family Goals  Goal: Patient/Family Long Term Goal  Description: Patient's Long Term Goal: To go home    Interventions:  - Medications as scheduled  - Tests per MD  - Frequent rounding  - See additional Care Plan goals for specific interventions  Outcome: Progressing  Goal: Patient/Family Short Term Goal  Description: Patient's Short Term Goal: Pain control    Interventions:   - PRN pain medications  - ENT consult  - See additional Care Plan goals for specific interventions  Outcome: Progressing     Problem: SKIN/TISSUE INTEGRITY - ADULT  Goal: Skin integrity remains intact  Description: INTERVENTIONS  - Assess and document risk factors for  pressure ulcer development  - Assess and document skin integrity  - Monitor for areas of redness and/or skin breakdown  - Initiate interventions, skin care algorithm/standards of care as needed  Outcome: Progressing  Goal: Incision(s), wounds(s) or drain site(s) healing without S/S of infection  Description: INTERVENTIONS:  - Assess and document risk factors for pressure ulcer development  - Assess and document skin integrity  - Assess and document dressing/incision, wound bed, drain sites and surrounding tissue  - Implement wound care per orders  - Initiate isolation precautions as appropriate  - Initiate Pressure Ulcer prevention bundle as indicated  Outcome: Progressing     Problem: SAFETY ADULT - FALL  Goal: Free from fall injury  Description: INTERVENTIONS:  - Assess pt frequently for physical needs  - Identify cognitive and physical deficits and behaviors that affect risk of falls.  - [place] fall precautions as indicated by assessment.  - Educate pt/family on patient safety including physical limitations  - Instruct pt to call for assistance with activity based on assessment  - Modify environment to reduce risk of injury  - Provide assistive devices as appropriate  - Consider OT/PT consult to assist with strengthening/mobility  - Encourage toileting schedule  Outcome: Progressing     Problem: DISCHARGE PLANNING  Goal: Discharge to home or other facility with appropriate resources  Description: INTERVENTIONS:  - Identify barriers to discharge w/pt and caregiver  - Include patient/family/discharge partner in discharge planning  - Arrange for needed discharge resources and transportation as appropriate  - Identify discharge learning needs (meds, wound care, etc)  - Arrange for interpreters to assist at discharge as needed  - Consider post-discharge preferences of patient/family/discharge partner  - Complete POLST form as appropriate  - Assess patient's ability to be responsible for managing their own health  -  Refer to Case Management Department for coordinating discharge planning if the patient needs post-hospital services based on physician/LIP order or complex needs related to functional status, cognitive ability or social support system  Outcome: Progressing     Problem: PAIN - ADULT  Goal: Verbalizes/displays adequate comfort level or patient's stated pain goal  Description: INTERVENTIONS:  - Encourage pt to monitor pain and request assistance  - Assess pain using appropriate pain scale  - Administer analgesics based on type and severity of pain and evaluate response  - Implement non-pharmacological measures as appropriate and evaluate response  - Consider cultural and social influences on pain and pain management  - Manage/alleviate anxiety  - Utilize distraction and/or relaxation techniques  - Monitor for opioid side effects  - Notify MD/LIP if interventions unsuccessful or patient reports new pain  - Anticipate increased pain with activity and pre-medicate as appropriate  Outcome: Not Progressing

## 2024-01-04 NOTE — PROGRESS NOTES
INFECTIOUS DISEASE PROGRESS NOTE  [facility] ID PROGRESS NOTE    Samy Hoskins Patient Status:  Inpatient    1955 MRN [id number]   Location [facility] 5SW/SE Attending Leatha Marie MD   Hosp Day # 5 PCP Kaleb Marie MD     Subjective:  ROS reviewed. On room air. Was dizzy last night. Did get morphine late yesterday afternoon.    ASSESSMENT:    Antibiotics: Unasyn  Vancomycin, cefepime, flagyl     # L scalp wound infection in the setting of exposed hardware  - has bone-anchored hearing aid placed few years ago  - CT neg for abscess  - ENT following. S/p I&D, wound cx NGTD  -  Drainage swab Bacteroides     PLAN:  -  Continue on unasyn. Consider course of PO augmentin on DC until follow-up with own ENT physician.  -  Follow fever curve, wbc.  -  Reviewed labs, micro, imaging reports, available old records.  -  Case d/w patient, RN.     History of Present Illness:  Samy Hoskins is a 68 year old male with HTN, HLD, DMII, deafness s/p L ear hearing aid implant 2-3 years ago who presented to ED on 23 with L sided scalp pain, headache, dizziness and nausea. Symptoms have been ongoing for several months but has gotten worse. Was prescribed Bactrim by his PCP and was recently seen in ED on . Was prescribed cipro and clinda but symptoms did not improve. Denies any fever at home.     Physical Exam:  /71 (BP Location: Left arm)   Pulse 62   Temp 97.9 °F (36.6 °C) (Oral)   Resp 18   Wt 212 lb 1.3 oz (96.2 kg)   SpO2 98%   BMI 29.58 kg/m²     Gen:   Awake, in chair  HEENT:  EOMI, L scalp with exposed hardware, packing in place, neck supple  CV/lungs:  RRR, CTAB  Abdom:  Soft, no TTP  Skin/extrem:  No rashes, no c/c/e  Lines:  PIV+    Laboratory Data: Reviewed    Microbiology: Reviewed    Radiology: Reviewed      [facility]  [phone]  2024

## 2024-01-04 NOTE — PROGRESS NOTES
[facility]    Progress Note    Samy Hoskins Patient Status:  Inpatient    1955 MRN [id number]   Location [facility] 5SW/SE Attending Leatha Marie MD   Hosp Day # 5 PCP Kaleb Marie MD     Chief Complaint: Scalp infection    Subjective:     All other systems reviewed and are negative.    Continuing to improve    Objective:   Blood pressure 143/71, pulse 62, temperature 97.9 °F (36.6 °C), temperature source Oral, resp. rate 18, weight 212 lb 1.3 oz (96.2 kg), SpO2 98%.  Physical Exam  HENT:      Head:      Comments: Continue to improve.  I can now touch all around the [place] without any pain or tenderness.  No purulence or skin changes        Results:   Lab Results   Component Value Date    WBC 8.3 2024    HGB 13.0 2024    HCT 38.5 (L) 2024    .0 2024    CREATSERUM 1.01 2024    BUN 26 (H) 2024     2024    K 4.0 2024     2024    CO2 29.0 2024     (H) 2024    CA 9.6 2024    ALB 3.4 2023    ALKPHO 46 2023    BILT 0.2 2023    TP 6.4 2023    AST 30 2023    ALT 53 2023     2020       No results found.        Assessment & Plan:     Cellulitis of head or scalp  68-year-old with an infection of a left bone-anchored hearing aid in the setting of elevated glucose.  Has been ongoing for the last 3 weeks and has failed outpatient treatment.     CT scan did not demonstrate any abscess just soft tissue thickening.     Recommendations  -This is a bone-anchored hearing aid which is a bolt in the skull that sends vibrations to the inner ear and not a cochlear implant.  It is currently infected in the setting of elevated glucose.  He cannot hear from his right ear and so he depends on this device to hear so we will do our best to salvage this although it may need to eventually be removed. Implanting surgeon which is Dr Novak of [facility].      -Continuing to  improve.  Can now touch all around the bolt without any pain.  Probably okay for discharge with oral antibiotics later today or tomorrow definitely.    -Please call with any questions              Julio Cesar Nguyen MD  1/1/2024

## 2024-01-04 NOTE — PROGRESS NOTES
Care assumed from prior nurse this afternoon, pt stable, increased pain noted and MD notified, 1x dose of morphine given per order and norco now prn q4h, report given to night shift RN this evening.

## 2024-01-04 NOTE — PROGRESS NOTES
[facility]    Progress Note    Samy Hosknis Patient Status:  Inpatient    1955 MRN [id number]   Location [facility] 5SW/SE Attending Leatha Marie MD   Hosp Day # 5 PCP Kaleb Marie MD     Chief Complaint:     Subjective:     Constitutional: Negative.    HENT: Negative.          Pain to implant area improved today    Respiratory: Negative.     Cardiovascular: Negative.    Gastrointestinal: Negative.    Genitourinary: Negative.    Musculoskeletal: Negative.    Hematological: Negative.    Psychiatric/Behavioral: Negative.         Objective:   Blood pressure 125/71, pulse 62, temperature 97.9 °F (36.6 °C), temperature source Oral, resp. rate 16, weight 212 lb 1.3 oz (96.2 kg), SpO2 98%.  Physical Exam  Vitals and nursing note reviewed.   HENT:      Head: Normocephalic and atraumatic.      Comments: Pain today improved      Nose: Nose normal.      Mouth/Throat:      Mouth: Mucous membranes are moist.   Cardiovascular:      Rate and Rhythm: Normal rate.   Pulmonary:      Effort: Pulmonary effort is normal.      Breath sounds: Normal breath sounds.   Abdominal:      General: Abdomen is flat. Bowel sounds are normal.      Palpations: Abdomen is soft.   Musculoskeletal:         General: Normal range of motion.      Cervical back: Normal range of motion and neck supple.   Skin:     General: Skin is warm and dry.   Neurological:      Mental Status: He is alert and oriented to person, place, and time.   Psychiatric:         Mood and Affect: Mood normal.         Results:   Lab Results   Component Value Date    WBC 8.3 2024    HGB 13.0 2024    HCT 38.5 (L) 2024    .0 2024    CREATSERUM 1.01 2024    BUN 26 (H) 2024     2024    K 4.0 2024     2024    CO2 29.0 2024     (H) 2024    CA 9.6 2024    ALB 3.4 2023    ALKPHO 46 2023    BILT 0.2 2023    TP 6.4 2023    AST 30 2023     ALT 53 04/18/2023     07/28/2020       No results found.        Assessment & Plan:     Continues to improve will follow ID and ENT recommendations       Pt seen in room, feels better today, the are around the implant less tender      Will follow with id and ent      Continue with iv ab      Cellulitis of head or scalp  Seen by ENTs/p I @d, continue with IV antibiotic per id , pain medication as need.     Hypertension monitor blood pressure, continue with current medication     Diabetes type-on insulin continue with Accu-Chek sliding scale insulin, hypoglycemia protocol  Bs elevated, will adjust treatment, spoke with pt and nurse on strict diabetic diet       Hypercholesterolemia continue with home medication     Cochlear implant in place he will need to follow-up with his ENT surgeon     GI and DVT prophylax              Kaleb Marie MD  1/4/2024

## 2024-01-05 ENCOUNTER — PATIENT OUTREACH (OUTPATIENT)
Dept: CASE MANAGEMENT | Age: 69
End: 2024-01-05

## 2024-01-05 VITALS
WEIGHT: 212.06 LBS | HEART RATE: 61 BPM | BODY MASS INDEX: 30 KG/M2 | TEMPERATURE: 98 F | RESPIRATION RATE: 18 BRPM | SYSTOLIC BLOOD PRESSURE: 132 MMHG | OXYGEN SATURATION: 97 % | DIASTOLIC BLOOD PRESSURE: 84 MMHG

## 2024-01-05 LAB
GLUCOSE BLDC GLUCOMTR-MCNC: 168 MG/DL (ref 70–99)
GLUCOSE BLDC GLUCOMTR-MCNC: 246 MG/DL (ref 70–99)

## 2024-01-05 PROCEDURE — 99239 HOSP IP/OBS DSCHRG MGMT >30: CPT | Performed by: INTERNAL MEDICINE

## 2024-01-05 NOTE — PLAN OF CARE
No acute changes. Pain in left ear improving per patient. Patient verbalized understanding of discharge instructions. Transportation home provided by girlfriend at bedside.     Problem: Patient Centered Care  Goal: Patient preferences are identified and integrated in the patient's plan of care  Description: Interventions:  - What would you like us to know as we care for you? From home with wife. 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  - Provide timely, complete, and accurate information to patient/family  - Incorporate patient and family knowledge, values, beliefs, and cultural backgrounds into the planning and delivery of care  - Encourage patient/family to participate in care and decision-making at the level they choose  - Honor patient and family perspectives and choices  Outcome: Adequate for Discharge     Problem: Diabetes/Glucose Control  Goal: Glucose maintained within prescribed range  Description: INTERVENTIONS:  - Monitor Blood Glucose as ordered  - Assess for signs and symptoms of hyperglycemia and hypoglycemia  - Administer ordered medications to maintain glucose within target range  - Assess barriers to adequate nutritional intake and initiate nutrition consult as needed  - Instruct patient on self management of diabetes  Outcome: Adequate for Discharge     Problem: Patient/Family Goals  Goal: Patient/Family Long Term Goal  Description: Patient's Long Term Goal: To go home    Interventions:  - Medications as scheduled  - Tests per MD  - Frequent rounding  - See additional Care Plan goals for specific interventions  Outcome: Adequate for Discharge  Goal: Patient/Family Short Term Goal  Description: Patient's Short Term Goal: Pain control    Interventions:   - PRN pain medications  - ENT consult  - See additional Care Plan goals for specific interventions  Outcome: Adequate for Discharge     Problem: SKIN/TISSUE INTEGRITY - ADULT  Goal: Skin integrity remains intact  Description: INTERVENTIONS  - Assess and document  risk factors for pressure ulcer development  - Assess and document skin integrity  - Monitor for areas of redness and/or skin breakdown  - Initiate interventions, skin care algorithm/standards of care as needed  Outcome: Adequate for Discharge  Goal: Incision(s), wounds(s) or drain site(s) healing without S/S of infection  Description: INTERVENTIONS:  - Assess and document risk factors for pressure ulcer development  - Assess and document skin integrity  - Assess and document dressing/incision, wound bed, drain sites and surrounding tissue  - Implement wound care per orders  - Initiate isolation precautions as appropriate  - Initiate Pressure Ulcer prevention bundle as indicated  Outcome: Adequate for Discharge     Problem: PAIN - ADULT  Goal: Verbalizes/displays adequate comfort level or patient's stated pain goal  Description: INTERVENTIONS:  - Encourage pt to monitor pain and request assistance  - Assess pain using appropriate pain scale  - Administer analgesics based on type and severity of pain and evaluate response  - Implement non-pharmacological measures as appropriate and evaluate response  - Consider cultural and social influences on pain and pain management  - Manage/alleviate anxiety  - Utilize distraction and/or relaxation techniques  - Monitor for opioid side effects  - Notify MD/LIP if interventions unsuccessful or patient reports new pain  - Anticipate increased pain with activity and pre-medicate as appropriate  Outcome: Adequate for Discharge     Problem: SAFETY ADULT - FALL  Goal: Free from fall injury  Description: INTERVENTIONS:  - Assess pt frequently for physical needs  - Identify cognitive and physical deficits and behaviors that affect risk of falls.  - [place] fall precautions as indicated by assessment.  - Educate pt/family on patient safety including physical limitations  - Instruct pt to call for assistance with activity based on assessment  - Modify environment to reduce risk of  injury  - Provide assistive devices as appropriate  - Consider OT/PT consult to assist with strengthening/mobility  - Encourage toileting schedule  Outcome: Adequate for Discharge     Problem: DISCHARGE PLANNING  Goal: Discharge to home or other facility with appropriate resources  Description: INTERVENTIONS:  - Identify barriers to discharge w/pt and caregiver  - Include patient/family/discharge partner in discharge planning  - Arrange for needed discharge resources and transportation as appropriate  - Identify discharge learning needs (meds, wound care, etc)  - Arrange for interpreters to assist at discharge as needed  - Consider post-discharge preferences of patient/family/discharge partner  - Complete POLST form as appropriate  - Assess patient's ability to be responsible for managing their own health  - Refer to Case Management Department for coordinating discharge planning if the patient needs post-hospital services based on physician/LIP order or complex needs related to functional status, cognitive ability or social support system  Outcome: Adequate for Discharge

## 2024-01-05 NOTE — CM/SW NOTE
01/05/24 0900   Discharge disposition   Expected discharge disposition Home or Self   Discharge transportation Private car     Pt discussed in RN DC rounds. Pt received MDO for discharge. Pt is self, NN at this time.     1043am- SW was informed that pt needs to have appt scheduled with his ENT- Dr. Novak of [facility]. TL called number listed on [facility] Website, and left voicemail. SW informed pt of appointment is needed. Pt informed SW that wife tried calling yesterday as well and wife will be here at noon. Sw provided pt with ENT name and number.     1046am-SW called [facility]- Dr. Novak office , and left vm with wife number on  for ASAP appt.     111pm- SW was informed that ENT that has been following him that pt can be seen by him as well. SW called his office [phone], and was given appt of 1/8/2023 at 410pm. SW was informed that pt needs to arrive 15 mins early, ans wear a mask if having fever, resp issues.  SW will place information in AVS and inform family member and patient.     SW/CM to remain available for support and/or discharge planning.     Page Cast, MSW, LSW   x [phone]

## 2024-01-05 NOTE — PLAN OF CARE
Problem: Patient Centered Care  Goal: Patient preferences are identified and integrated in the patient's plan of care  Description: Interventions:  - What would you like us to know as we care for you? From home with wife. 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  - Provide timely, complete, and accurate information to patient/family  - Incorporate patient and family knowledge, values, beliefs, and cultural backgrounds into the planning and delivery of care  - Encourage patient/family to participate in care and decision-making at the level they choose  - Honor patient and family perspectives and choices  Outcome: Progressing     Problem: SKIN/TISSUE INTEGRITY - ADULT  Goal: Skin integrity remains intact  Description: INTERVENTIONS  - Assess and document risk factors for pressure ulcer development  - Assess and document skin integrity  - Monitor for areas of redness and/or skin breakdown  - Initiate interventions, skin care algorithm/standards of care as needed  Outcome: Progressing  Goal: Incision(s), wounds(s) or drain site(s) healing without S/S of infection  Description: INTERVENTIONS:  - Assess and document risk factors for pressure ulcer development  - Assess and document skin integrity  - Assess and document dressing/incision, wound bed, drain sites and surrounding tissue  - Implement wound care per orders  - Initiate isolation precautions as appropriate  - Initiate Pressure Ulcer prevention bundle as indicated  Outcome: Progressing     Problem: SAFETY ADULT - FALL  Goal: Free from fall injury  Description: INTERVENTIONS:  - Assess pt frequently for physical needs  - Identify cognitive and physical deficits and behaviors that affect risk of falls.  - [place] fall precautions as indicated by assessment.  - Educate pt/family on patient safety including physical limitations  - Instruct pt to call for assistance with activity based on assessment  - Modify environment to reduce risk of injury  - Provide assistive devices as  appropriate  - Consider OT/PT consult to assist with strengthening/mobility  - Encourage toileting schedule  Outcome: Progressing     Problem: DISCHARGE PLANNING  Goal: Discharge to home or other facility with appropriate resources  Description: INTERVENTIONS:  - Identify barriers to discharge w/pt and caregiver  - Include patient/family/discharge partner in discharge planning  - Arrange for needed discharge resources and transportation as appropriate  - Identify discharge learning needs (meds, wound care, etc)  - Arrange for interpreters to assist at discharge as needed  - Consider post-discharge preferences of patient/family/discharge partner  - Complete POLST form as appropriate  - Assess patient's ability to be responsible for managing their own health  - Refer to Case Management Department for coordinating discharge planning if the patient needs post-hospital services based on physician/LIP order or complex needs related to functional status, cognitive ability or social support system  Outcome: Progressing     Problem: Diabetes/Glucose Control  Goal: Glucose maintained within prescribed range  Description: INTERVENTIONS:  - Monitor Blood Glucose as ordered  - Assess for signs and symptoms of hyperglycemia and hypoglycemia  - Administer ordered medications to maintain glucose within target range  - Assess barriers to adequate nutritional intake and initiate nutrition consult as needed  - Instruct patient on self management of diabetes  Outcome: Not Progressing     Problem: Patient/Family Goals  Goal: Patient/Family Long Term Goal  Description: Patient's Long Term Goal: To go home    Interventions:  - Medications as scheduled  - Tests per MD  - Frequent rounding  - See additional Care Plan goals for specific interventions  Outcome: Not Progressing  Goal: Patient/Family Short Term Goal  Description: Patient's Short Term Goal: Pain control    Interventions:   - PRN pain medications  - ENT consult  - See additional  Care Plan goals for specific interventions  Outcome: Not Progressing     Problem: PAIN - ADULT  Goal: Verbalizes/displays adequate comfort level or patient's stated pain goal  Description: INTERVENTIONS:  - Encourage pt to monitor pain and request assistance  - Assess pain using appropriate pain scale  - Administer analgesics based on type and severity of pain and evaluate response  - Implement non-pharmacological measures as appropriate and evaluate response  - Consider cultural and social influences on pain and pain management  - Manage/alleviate anxiety  - Utilize distraction and/or relaxation techniques  - Monitor for opioid side effects  - Notify MD/LIP if interventions unsuccessful or patient reports new pain  - Anticipate increased pain with activity and pre-medicate as appropriate  Outcome: Not Progressing

## 2024-01-05 NOTE — PROGRESS NOTES
Calling pt wife, Nurys to schedule DM apt    Dr Kaleb Marie  [address] [place] [address]  [phone]  Apt made:  Fri 01/12 @2:45pm  Confirmed w/pt wife, Nurys  Closing encounter

## 2024-01-05 NOTE — DISCHARGE INSTRUCTIONS
Please try to make an appointment with Dr. Novak at [facility] as soon possible. Here is the website for Dr. Novak and the office phone number. An appointment with Dr. Julio Cesar Nguyen has been scheduled since we were unable to reach Dr. Novak's office.     Website: [email or web address]   Phone Number: [phone]     Julio Cesar Shaffer  Address: [address], [place] [address]  Appointment: 1/8/2024  Time 410pm- Please arrive 15 minutes early prior to appt  *please wear a mask if experiencing respiratory issues     Dr Kaleb Marie  [address]  [place], [place] [id number] [phone]  Apt made:  Fri 01/12 @2:45pm

## 2024-01-05 NOTE — DISCHARGE SUMMARY
DISCHARGE SUMMARY     Samy Hoskins Patient Status:  Inpatient    1955 MRN [id number]   Location [facility] 5SW/SE Attending Leatha Marie MD   Hosp Day # 6 PCP Kaleb Marie MD     Date of Admission: 2023  Date of Discharge:   Discharge Disposition: Home or Self Care    Admitting Diagnosis:   Cochlear implant in place [Z96.21]  Cellulitis of head or scalp [L03.811]    Hospital Discharge Diagnoses: ***    TCM Diagnosis at discharge from Hospital: {Select the most appropriate high risk diagnosis to describe the hospital stay from this list :4958}    Please note that only patients enrolled in the Medicare ACO, [facility] ACO and [facility] HMOs will be handled by a member of the Care Management Team.  For all other patients, please follow usual protocol for discharge care transition.    Lace+ Score: 47  59-90 High Risk  29-58 Medium Risk  0-28   Low Risk.    Risk of readmission: Samy Hoskins {Select level of risk after discharge:4959::\"has Moderate Risk of readmission after discharge from the hospital.\"}    History of Present Illness: ***    Brief Synopsis: ***    Discharge Physical Exam: {Exam; System select, Male & Female:5706}    Procedures during hospitalization:   ***    Incidental or significant findings and recommendations (brief descriptions):  ***    Lab/Test results pending at Discharge:   ***    Consultants:  ***    Discharge Medication List:     Discharge Medications        START taking these medications        Instructions Prescription details   amoxicillin clavulanate 875-125 MG Tabs  Commonly known as: Augmentin      Take 1 tablet by mouth 2 (two) times daily for 14 days.   Stop taking on: 2024  Quantity: 28 tablet  Refills: 0            ASK your doctor about these medications        Instructions Prescription details   amLODIPine 10 MG Tabs  Commonly known as: Norvasc      Take 1 tablet (10 mg total) by mouth daily.   Quantity: 90 tablet  Refills: 3     aspirin 81 MG Tbec      Take 1  tablet (81 mg total) by mouth daily.   Quantity: 90 tablet  Refills: 3     ciprofloxacin 500 MG Tabs  Commonly known as: Cipro      Take 1 tablet (500 mg total) by mouth 2 (two) times daily for 10 days.   Stop taking on: January 5, 2024  Quantity: 20 tablet  Refills: 0     clindamycin 300 MG Caps  Commonly known as: Cleocin      Take 1 capsule (300 mg total) by mouth 3 (three) times daily for 10 days.   Stop taking on: January 5, 2024  Quantity: 30 capsule  Refills: 0     ezetimibe 10 MG Tabs  Commonly known as: Zetia      Take 1 tablet (10 mg total) by mouth nightly.   Quantity: 90 tablet  Refills: 3     furosemide 40 MG Tabs  Commonly known as: Lasix      Take 1 tablet (40 mg total) by mouth 2 (two) times daily.   Quantity: 90 tablet  Refills: 3     HumaLOG 100 UNIT/ML Soln  Generic drug: insulin lispro      Inject 20 Units into the skin in the morning, at noon, and at bedtime.   Quantity: 60 mL  Refills: 2     HYDROcodone-acetaminophen 5-325 MG Tabs  Commonly known as: Norco      Take 1 tablet by mouth every 6 (six) hours as needed for Pain.   Quantity: 15 tablet  Refills: 0     insulin glargine 100 UNIT/ML Soln  Commonly known as: Lantus      Inject 60 Units into the skin nightly.   Quantity: 10 mL  Refills: 5     isosorbide mononitrate ER 30 MG Tb24  Commonly known as: Imdur      Take 1 tablet (30 mg total) by mouth daily.   Quantity: 90 tablet  Refills: 3     levothyroxine 125 MCG Tabs  Commonly known as: Synthroid      Take 1 tablet (125 mcg total) by mouth before breakfast.   Quantity: 90 tablet  Refills: 3     losartan 25 MG Tabs  Commonly known as: Cozaar      Take 1 tablet (25 mg total) by mouth daily.   Quantity: 90 tablet  Refills: 3     metFORMIN HCl 1000 MG Tabs  Commonly known as: GLUCOPHAGE      Take 1 tablet (1,000 mg total) by mouth 2 (two) times daily with meals.   Quantity: 180 tablet  Refills: 3     rosuvastatin 20 MG Tabs  Commonly known as: Crestor      Take 1 tablet (20 mg total) by mouth  nightly.   Quantity: 90 tablet  Refills: 3     Vitamin D3 1.25 MG (96790 UT) Caps      Take 1 capsule by mouth once a week.   Refills: 0               Where to Get Your Medications        These medications were sent to [id number] - [place], [place] - [address] [phone], [phone]  [address], [place] [address]      Phone: [phone]   amoxicillin clavulanate 875-125 MG Tabs         Discharge Plan:  Discharge Condition: {DISCHARGE CONDITION:10561}    Current Discharge Medication List        New Orders    Details   amoxicillin clavulanate 875-125 MG Oral Tab Take 1 tablet by mouth 2 (two) times daily for 14 days.           Home Meds - Unchanged    Details   ciprofloxacin 500 MG Oral Tab Take 1 tablet (500 mg total) by mouth 2 (two) times daily for 10 days.      clindamycin 300 MG Oral Cap Take 1 capsule (300 mg total) by mouth 3 (three) times daily for 10 days.      HYDROcodone-acetaminophen 5-325 MG Oral Tab Take 1 tablet by mouth every 6 (six) hours as needed for Pain.      metFORMIN HCl 1000 MG Oral Tab Take 1 tablet (1,000 mg total) by mouth 2 (two) times daily with meals.      levothyroxine 125 MCG Oral Tab Take 1 tablet (125 mcg total) by mouth before breakfast.      rosuvastatin 20 MG Oral Tab Take 1 tablet (20 mg total) by mouth nightly.      aspirin 81 MG Oral Tab EC Take 1 tablet (81 mg total) by mouth daily.      isosorbide mononitrate ER 30 MG Oral Tablet 24 Hr Take 1 tablet (30 mg total) by mouth daily.      ezetimibe 10 MG Oral Tab Take 1 tablet (10 mg total) by mouth nightly.      amLODIPine 10 MG Oral Tab Take 1 tablet (10 mg total) by mouth daily.      losartan 25 MG Oral Tab Take 1 tablet (25 mg total) by mouth daily.      furosemide 40 MG Oral Tab Take 1 tablet (40 mg total) by mouth 2 (two) times daily.      Cholecalciferol (VITAMIN D3) 1.25 MG (19585 UT) Oral Cap Take 1 capsule by mouth once a week.      insulin glargine 100 UNIT/ML Subcutaneous Solution Inject 60  Units into the skin nightly.      insulin lispro (HUMALOG) 100 UNIT/ML Subcutaneous Solution Inject 20 Units into the skin in the morning, at noon, and at bedtime.                 Discharge Diet: {Discharge Diet:5710}    Discharge Activity: {Discharge Activity:5711}    Follow-up appointment:   Kaleb Marie MD  [address] [address]  [phone]    Follow up in 1 week(s)  Diabetes follow up      Vital signs:  Temp:  [97.6 °F (36.4 °C)-98.1 °F (36.7 °C)] 97.6 °F (36.4 °C)  Pulse:  [56-71] 61  Resp:  [16-18] 18  BP: (116-143)/(62-84) 132/84  SpO2:  [96 %-98 %] 97 %    -----------------------------------------------------------------------------------------------  PATIENT DISCHARGE INSTRUCTIONS: See electronic chart    Kaleb Marie MD 1/5/2024    Time spent:  {Time; minute intervals:55044}

## 2024-01-05 NOTE — PLAN OF CARE
No acute changes. Patient resting comfortably, call light within reach. Patient calls appropriately.    Problem: Patient Centered Care  Goal: Patient preferences are identified and integrated in the patient's plan of care  Description: Interventions:  - What would you like us to know as we care for you? From home with wife. 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  - Provide timely, complete, and accurate information to patient/family  - Incorporate patient and family knowledge, values, beliefs, and cultural backgrounds into the planning and delivery of care  - Encourage patient/family to participate in care and decision-making at the level they choose  - Honor patient and family perspectives and choices  Outcome: Progressing     Problem: Diabetes/Glucose Control  Goal: Glucose maintained within prescribed range  Description: INTERVENTIONS:  - Monitor Blood Glucose as ordered  - Assess for signs and symptoms of hyperglycemia and hypoglycemia  - Administer ordered medications to maintain glucose within target range  - Assess barriers to adequate nutritional intake and initiate nutrition consult as needed  - Instruct patient on self management of diabetes  Outcome: Progressing     Problem: Patient/Family Goals  Goal: Patient/Family Long Term Goal  Description: Patient's Long Term Goal: To go home    Interventions:  - Medications as scheduled  - Tests per MD  - Frequent rounding  - See additional Care Plan goals for specific interventions  Outcome: Progressing  Goal: Patient/Family Short Term Goal  Description: Patient's Short Term Goal: Pain control    Interventions:   - PRN pain medications  - ENT consult  - See additional Care Plan goals for specific interventions  Outcome: Progressing     Problem: SKIN/TISSUE INTEGRITY - ADULT  Goal: Skin integrity remains intact  Description: INTERVENTIONS  - Assess and document risk factors for pressure ulcer development  - Assess and document skin integrity  - Monitor for areas of redness  and/or skin breakdown  - Initiate interventions, skin care algorithm/standards of care as needed  Outcome: Progressing  Goal: Incision(s), wounds(s) or drain site(s) healing without S/S of infection  Description: INTERVENTIONS:  - Assess and document risk factors for pressure ulcer development  - Assess and document skin integrity  - Assess and document dressing/incision, wound bed, drain sites and surrounding tissue  - Implement wound care per orders  - Initiate isolation precautions as appropriate  - Initiate Pressure Ulcer prevention bundle as indicated  Outcome: Progressing     Problem: PAIN - ADULT  Goal: Verbalizes/displays adequate comfort level or patient's stated pain goal  Description: INTERVENTIONS:  - Encourage pt to monitor pain and request assistance  - Assess pain using appropriate pain scale  - Administer analgesics based on type and severity of pain and evaluate response  - Implement non-pharmacological measures as appropriate and evaluate response  - Consider cultural and social influences on pain and pain management  - Manage/alleviate anxiety  - Utilize distraction and/or relaxation techniques  - Monitor for opioid side effects  - Notify MD/LIP if interventions unsuccessful or patient reports new pain  - Anticipate increased pain with activity and pre-medicate as appropriate  Outcome: Progressing     Problem: SAFETY ADULT - FALL  Goal: Free from fall injury  Description: INTERVENTIONS:  - Assess pt frequently for physical needs  - Identify cognitive and physical deficits and behaviors that affect risk of falls.  - Belle fall precautions as indicated by assessment.  - Educate pt/family on patient safety including physical limitations  - Instruct pt to call for assistance with activity based on assessment  - Modify environment to reduce risk of injury  - Provide assistive devices as appropriate  - Consider OT/PT consult to assist with strengthening/mobility  - Encourage toileting  schedule  Outcome: Progressing     Problem: DISCHARGE PLANNING  Goal: Discharge to home or other facility with appropriate resources  Description: INTERVENTIONS:  - Identify barriers to discharge w/pt and caregiver  - Include patient/family/discharge partner in discharge planning  - Arrange for needed discharge resources and transportation as appropriate  - Identify discharge learning needs (meds, wound care, etc)  - Arrange for interpreters to assist at discharge as needed  - Consider post-discharge preferences of patient/family/discharge partner  - Complete POLST form as appropriate  - Assess patient's ability to be responsible for managing their own health  - Refer to Case Management Department for coordinating discharge planning if the patient needs post-hospital services based on physician/LIP order or complex needs related to functional status, cognitive ability or social support system  Outcome: Progressing

## 2024-01-05 NOTE — PROGRESS NOTES
INFECTIOUS DISEASE PROGRESS NOTE  [facility] ID PROGRESS NOTE    Saym Hoskins Patient Status:  Inpatient    1955 MRN [id number]   Location [facility] 5SW/SE Attending Leatha Marie MD   Hosp Day # 6 PCP Kaleb Marie MD     Subjective:  ROS reviewed. Feels like pain is under better control.    ASSESSMENT:    Antibiotics: Unasyn  Vancomycin, cefepime, flagyl     # L scalp wound infection in the setting of exposed hardware  - Has bone-anchored hearing aid placed few years ago  - CT neg for abscess  - ENT following. S/p I&D, wound cx NGTD  - Drainage swab Bacteroides     PLAN:  -  Currently on unasyn. Will DC on course of PO augmenti with FU with own ENT physician.  -  Follow fever curve, wbc.  -  Reviewed labs, micro, imaging reports, available old records.  -  Case d/w patient, RN.     History of Present Illness:  Samy Hoskins is a 68 year old male with HTN, HLD, DMII, deafness s/p L ear hearing aid implant 2-3 years ago who presented to ED on 23 with L sided scalp pain, headache, dizziness and nausea. Symptoms have been ongoing for several months but has gotten worse. Was prescribed Bactrim by his PCP and was recently seen in ED on . Was prescribed cipro and clinda but symptoms did not improve. Denies any fever at home.     Physical Exam:  /84 (BP Location: Left arm)   Pulse 61   Temp 97.6 °F (36.4 °C) (Oral)   Resp 18   Wt 212 lb 1.3 oz (96.2 kg)   SpO2 97%   BMI 29.58 kg/m²     Gen:   Awake, in chair  HEENT:  EOMI, L scalp with exposed hardware, packing in place, neck supple  CV/lungs:  Regular rate and rhythm, CTAB  Abdom:  Soft, no TTP  Skin/extrem:  No rashes, no c/c/e  Lines:  PIV+    Laboratory Data: Reviewed    Microbiology: Reviewed    Radiology: Reviewed      [facility]  [phone]  2024

## 2024-01-08 ENCOUNTER — PATIENT OUTREACH (OUTPATIENT)
Dept: CASE MANAGEMENT | Age: 69
End: 2024-01-08

## 2024-01-08 ENCOUNTER — HOSPITAL ENCOUNTER (EMERGENCY)
Facility: HOSPITAL | Age: 69
Discharge: HOME | End: 2024-01-08
Attending: EMERGENCY MEDICINE
Payer: MEDICAID

## 2024-01-08 VITALS
SYSTOLIC BLOOD PRESSURE: 127 MMHG | RESPIRATION RATE: 10 BRPM | DIASTOLIC BLOOD PRESSURE: 73 MMHG | BODY MASS INDEX: 29 KG/M2 | HEART RATE: 59 BPM | WEIGHT: 210 LBS | TEMPERATURE: 98 F | OXYGEN SATURATION: 97 %

## 2024-01-08 DIAGNOSIS — R20.2 PARESTHESIAS: ICD-10-CM

## 2024-01-08 DIAGNOSIS — Z02.9 ENCOUNTERS FOR UNSPECIFIED ADMINISTRATIVE PURPOSE: ICD-10-CM

## 2024-01-08 DIAGNOSIS — Z96.21 COCHLEAR IMPLANT IN PLACE: ICD-10-CM

## 2024-01-08 DIAGNOSIS — R42 VERTIGO: Primary | ICD-10-CM

## 2024-01-08 DIAGNOSIS — L03.811 CELLULITIS OF HEAD OR SCALP: Primary | ICD-10-CM

## 2024-01-08 LAB
ANION GAP SERPL CALC-SCNC: 6 MMOL/L (ref 0–18)
BASOPHILS # BLD AUTO: 0.06 X10(3) UL (ref 0–0.2)
BASOPHILS NFR BLD AUTO: 0.5 %
BILIRUB UR QL: NEGATIVE
BUN BLD-MCNC: 31 MG/DL (ref 9–23)
BUN/CREAT SERPL: 27.7 (ref 10–20)
CALCIUM BLD-MCNC: 9.5 MG/DL (ref 8.7–10.4)
CHLORIDE SERPL-SCNC: 104 MMOL/L (ref 98–112)
CO2 SERPL-SCNC: 29 MMOL/L (ref 21–32)
CREAT BLD-MCNC: 1.12 MG/DL
DEPRECATED RDW RBC AUTO: 38.5 FL (ref 35.1–46.3)
EGFRCR SERPLBLD CKD-EPI 2021: 72 ML/MIN/1.73M2 (ref 60–?)
EOSINOPHIL # BLD AUTO: 0.28 X10(3) UL (ref 0–0.7)
EOSINOPHIL NFR BLD AUTO: 2.4 %
ERYTHROCYTE [DISTWIDTH] IN BLOOD BY AUTOMATED COUNT: 13.1 % (ref 11–15)
GLUCOSE BLD-MCNC: 205 MG/DL (ref 70–99)
GLUCOSE UR-MCNC: NORMAL MG/DL
HCT VFR BLD AUTO: 37.7 %
HGB BLD-MCNC: 13.1 G/DL
HYALINE CASTS #/AREA URNS AUTO: PRESENT /LPF
IMM GRANULOCYTES # BLD AUTO: 0.06 X10(3) UL (ref 0–1)
IMM GRANULOCYTES NFR BLD: 0.5 %
KETONES UR-MCNC: NEGATIVE MG/DL
LEUKOCYTE ESTERASE UR QL STRIP.AUTO: NEGATIVE
LYMPHOCYTES # BLD AUTO: 1.74 X10(3) UL (ref 1–4)
LYMPHOCYTES NFR BLD AUTO: 15 %
MCH RBC QN AUTO: 28.3 PG (ref 26–34)
MCHC RBC AUTO-ENTMCNC: 34.7 G/DL (ref 31–37)
MCV RBC AUTO: 81.4 FL
MONOCYTES # BLD AUTO: 0.89 X10(3) UL (ref 0.1–1)
MONOCYTES NFR BLD AUTO: 7.7 %
NEUTROPHILS # BLD AUTO: 8.6 X10 (3) UL (ref 1.5–7.7)
NEUTROPHILS # BLD AUTO: 8.6 X10(3) UL (ref 1.5–7.7)
NEUTROPHILS NFR BLD AUTO: 73.9 %
NITRITE UR QL STRIP.AUTO: NEGATIVE
OSMOLALITY SERPL CALC.SUM OF ELEC: 300 MOSM/KG (ref 275–295)
PH UR: 6 [PH] (ref 5–8)
PLATELET # BLD AUTO: 189 10(3)UL (ref 150–450)
POTASSIUM SERPL-SCNC: 4.4 MMOL/L (ref 3.5–5.1)
PROT UR-MCNC: 300 MG/DL
RBC # BLD AUTO: 4.63 X10(6)UL
SODIUM SERPL-SCNC: 139 MMOL/L (ref 136–145)
SP GR UR STRIP: 1.02 (ref 1–1.03)
UROBILINOGEN UR STRIP-ACNC: NORMAL
WBC # BLD AUTO: 11.6 X10(3) UL (ref 4–11)

## 2024-01-08 PROCEDURE — 1111F DSCHRG MED/CURRENT MED MERGE: CPT

## 2024-01-08 PROCEDURE — 96374 THER/PROPH/DIAG INJ IV PUSH: CPT

## 2024-01-08 PROCEDURE — 80048 BASIC METABOLIC PNL TOTAL CA: CPT

## 2024-01-08 PROCEDURE — 99284 EMERGENCY DEPT VISIT MOD MDM: CPT

## 2024-01-08 PROCEDURE — 81001 URINALYSIS AUTO W/SCOPE: CPT | Performed by: EMERGENCY MEDICINE

## 2024-01-08 PROCEDURE — 85025 COMPLETE CBC W/AUTO DIFF WBC: CPT | Performed by: EMERGENCY MEDICINE

## 2024-01-08 PROCEDURE — 80048 BASIC METABOLIC PNL TOTAL CA: CPT | Performed by: EMERGENCY MEDICINE

## 2024-01-08 PROCEDURE — 85025 COMPLETE CBC W/AUTO DIFF WBC: CPT

## 2024-01-08 RX ORDER — ONDANSETRON 2 MG/ML
4 INJECTION INTRAMUSCULAR; INTRAVENOUS ONCE
Status: COMPLETED | OUTPATIENT
Start: 2024-01-08 | End: 2024-01-08

## 2024-01-08 RX ORDER — MECLIZINE HYDROCHLORIDE 25 MG/1
25 TABLET ORAL ONCE
Status: COMPLETED | OUTPATIENT
Start: 2024-01-08 | End: 2024-01-08

## 2024-01-08 NOTE — ED PROVIDER NOTES
Patient Seen in: [facility] Emergency Department      History     Chief Complaint   Patient presents with    Dizziness     Stated Complaint: Dizziness; Nausea    Subjective:   HPI    68-year-old male recently just admitted and discharged 3 days ago with an exit site infection of the skin and drainage of an abscess of a left cochlear implant site here now with dizziness.  No fever.  Still on abx.  Due to see his ENT tomorrow.  Has some lip paresthesias as well.  He states that he has some blurred vision in both eyes when he experiences the dizziness.  He has better when he lays still.  No trauma.    Objective:   Past Medical History:   Diagnosis Date    Atherosclerosis of coronary artery     Deaf, right     Diabetes (HCC)     Essential hypertension     Hyperlipidemia     Thyroid disease               Past Surgical History:   Procedure Laterality Date    CORONARY STENT PLACEMENT      ROTATOR CUFF REPAIR Left                 Social History     Socioeconomic History    Marital status:    Tobacco Use    Smoking status: Former     Passive exposure: Never    Smokeless tobacco: Never   Vaping Use    Vaping Use: Never used   Substance and Sexual Activity    Alcohol use: Not Currently    Drug use: Never     Social Determinants of Health     Food Insecurity: No Food Insecurity (12/30/2023)    Food Insecurity     Food Insecurity: Never true   Transportation Needs: No Transportation Needs (12/30/2023)    Transportation Needs     Lack of Transportation: No   Housing Stability: Low Risk  (12/30/2023)    Housing Stability     Housing Instability: No              Review of Systems    Positive for stated complaint: Dizziness; Nausea  Other systems are as noted in HPI.  Constitutional and vital signs reviewed.      All other systems reviewed and negative except as noted above.    Physical Exam     ED Triage Vitals [01/08/24 1142]   /71   Pulse 66   Resp 18   Temp 97.5 °F (36.4 °C)   Temp src Oral   SpO2 97 %   O2  Device None (Room air)       Current:/80   Pulse 60   Temp 97.5 °F (36.4 °C) (Oral)   Resp 11   Wt 95.3 kg   SpO2 97%   BMI 29.29 kg/m²         Physical Exam    Constitutional: Oriented to person, place, and time.  Appears well-developed. No distress.   Head: Normocephalic and atraumatic.  Slight implant in the left temporal region noted.  No overt drainage or bleeding at this time.  Eyes: Conjunctivae are normal. Pupils are equal, round, and reactive to light.   Neck: Normal range of motion. Neck supple.  No meningismus.  Cardiovascular: Normal rate, regular rhythm and intact distal pulses.    Pulmonary/Chest: Effort normal. No respiratory distress.   Abdominal: Soft. There is no tenderness. There is no guarding.   Musculoskeletal: Normal range of motion. No edema or tenderness.   Neurological: Alert and oriented to person, place, and time.  No gross focal deficits.  Speech is intact.  Skin: Skin is warm and dry.   Psychiatric: Normal mood and affect.  Behavior is normal.   Nursing note and vitals reviewed.    Differential diagnosis includes peripheral vertigo, dehydration, medication effect, electrolyte abnormality, anemia.      ED Course     Labs Reviewed   BASIC METABOLIC PANEL (8) - Abnormal; Notable for the following components:       Result Value    Glucose 205 (*)     BUN 31 (*)     BUN/CREA Ratio 27.7 (*)     Calculated Osmolality 300 (*)     All other components within normal limits   URINALYSIS, ROUTINE - Abnormal; Notable for the following components:    Clarity Urine Turbid (*)     Blood Urine 1+ (*)     Protein Urine 300 (*)     RBC Urine 3-5 (*)     Hyaline Casts Present (*)     All other components within normal limits   CBC W/ DIFFERENTIAL - Abnormal; Notable for the following components:    WBC 11.6 (*)     HCT 37.7 (*)     Neutrophil Absolute Prelim 8.60 (*)     Neutrophil Absolute 8.60 (*)     All other components within normal limits   CBC WITH DIFFERENTIAL WITH PLATELET     Narrative:     The following orders were created for panel order CBC With Differential With Platelet.  Procedure                               Abnormality         Status                     ---------                               -----------         ------                     CBC W/ DIFFERENTIAL[[id number]]          Abnormal            Final result                 Please view results for these tests on the individual orders.   RAINBOW DRAW BLUE                      MDM                                         Medical Decision Making  Patient stable here.  I do not have concern for central vertigo.  After further review of the chart I do not feel he needs urgent neuroimaging.  Vitals are stable and patient feels somewhat improved.  He got up and walked here and does not have dizziness.   Workup is grossly unremarkable.  He is on antibiotics.  He has established follow-up tomorrow with his ENT doctor.  I encouraged him to do such make sure he stays hydrated use caution when moving around quickly.  He can take Tylenol for pain.  He will come back with any worsening or change.  Ambulatory at the time of discharge.    Problems Addressed:  Paresthesias: acute illness or injury  Vertigo: acute illness or injury with systemic symptoms    Amount and/or Complexity of Data Reviewed  Labs: ordered. Decision-making details documented in ED Course.    Risk  OTC drugs.  Prescription drug management.  Decision regarding hospitalization.        Disposition and Plan     Clinical Impression:  1. Vertigo    2. Paresthesias         Disposition:  Discharge  1/8/2024  3:51 pm    Follow-up:  Kaleb Marie MD  [address] [place] [id number]  [phone]    Call  As needed    YOUR ENT DOCTOR    Follow up in 1 day(s)      We recommend that you schedule follow up care with a primary care provider within the next three months to obtain basic health screening including reassessment of your blood pressure.      Medications  Prescribed:  Current Discharge Medication List

## 2024-01-08 NOTE — ED INITIAL ASSESSMENT (HPI)
Patient complains of headache, nausea and dizziness that has been ongoing since being discharged Friday for infected left cochlear implant. Also complaining of lip numbness and bilateral blurred vision that started on Saturday. Was told to come to ED by 5th floor RN that followed up with patient due to new sx that started on Saturday.

## 2024-01-08 NOTE — PROGRESS NOTES
Initial Post Discharge Follow Up   Discharge Date: 1/5/24  Contact Date: 1/8/2024    Consent Verification:  Assessment Completed With: Patient and patient's wife Nurys.   HIPAA Verified?  Yes    Discharge Dx:     Cellulitis of head or scalp   Cochlear implant in place     General:   How have you been since your discharge from the hospital?   NCM spoke with patient and the patient's wife Nurys. Patient placed phone on speaker for this conversation. The patient states he is not feeling better. The patient states he continues to feel very dizzy and nauseous. The patient states \"I feel like I am going to pass out, I feel like I am drunk\". The patient reports upon returning home on Friday he began to have episodes of blurry vision which would come and go. He continues to have blurry vision today which comes and goes. He feels lethargic and reports he has been sleeping more. He reports loss of appetite. He denies fever. He denies chest pain/SOB. The patient reports he has an appointment tomorrow with Dr. Novak for the cochlear implant and the top portion will be removed. МАРИНА advised patient at this time to return to the ER for further evaluation and he verbalized agreement with this. Nurys states they will proceed there now.

## 2024-01-09 ENCOUNTER — TELEPHONE (OUTPATIENT)
Dept: INTERNAL MEDICINE CLINIC | Facility: CLINIC | Age: 69
End: 2024-01-09

## 2024-01-09 NOTE — TELEPHONE ENCOUNTER
NCM spoke with the patient and spouse today for TCM. The patient was recently hospitalized for the following:     Cellulitis of head or scalp   Cochlear implant in place    The patient is scheduled for a TCM-HFU appointment on 1/12/204.       The spouse reported the patient has had the following glucose readings since discharge:   1/6 162(fasting)   243 (after lunch)   371 (after dinner  1/7 187 (fasting)   399 (2 hours after lunch)   325 (2 hours after dinner)   212 (in the evening)  1/8 182 (fasting)   244 (in the afternoon)   138 (3pm)   321 (after dinner)   400 (2 hours after dinner)  1/9 93 (fasting)    The patient has been following a strict DM diet at home.     NCM did confirm the patient is taking the following for DM management:     metFORMIN HCl 1000 MG Oral Tab 180 tablet 3 5/11/2023 --    Sig - Route: Take 1 tablet (1,000 mg total) by mouth 2 (two) times daily with meals.      insulin lispro (HUMALOG) 100 UNIT/ML Subcutaneous Solution 60 mL 2 4/18/2023 --   Sig:   Inject 20 Units into the skin in the morning, at noon, and at bedtime.       The patient reported taking 50 units of insuling glargine nightly. NCM did review the prescribed dosing:     insulin glargine 100 UNIT/ML Subcutaneous Solution 10 mL 5 5/11/2023 --   Sig:   Inject 60 Units into the skin nightly.       Please follow up with the patient/spouse and provide any further recommendations for DM management at home. Thank you.

## 2024-01-09 NOTE — PROGRESS NOTES
Initial Post Discharge Follow Up   Discharge Date: 1/5/24  Contact Date: 1/9/2024    Consent Verification:  Assessment Completed With: Spouse: Nurys  Permission received per patient?  written and patient   HIPAA Verified?  Yes    Discharge Dx:   Cellulitis of head or scalp   Cochlear implant in place     General:   How have you been since your discharge from the hospital? The spouse reported the patient has not had any further dizziness or facial numbness. The spouse reported the patient has had nausea after taking the prescribed antibiotic. The patient will take the antibiotic with food going forward. The patient reported the left scalp infection has remained fluid filled/swollen. The infection has continued to have purulent drainage. The spouse reported continued redness but denies any fever. The patient has had the chills and fatigue at home. The patient's pain has continued at the left scalp with a current pain of 7/10 during the TCM call. The patient has been managing the pain with Rx [place]. The spouse reported the patient's glucose readings over the weekend was around 240. The patient has been following a strict diabetic diet at home. The patient's fasting glucose reading this morning is 93.   The spouse reported the following glucose readings:   1/6 162(fasting)   243 (after lunch)   371 (after dinner  1/7 187 (fasting)   399 (2 hours after lunch)   325 (2 hours after dinner)   212 (in the evening)  1/8 182 (fasting)   244 (in the afternoon)   138 (3pm)   321 (after dinner)   400 (2 hours after dinner)  1/9 93 (fasting)  The patient denies any polyuria, polydipsia, headache, fruity breath or abdominal pain. The spouse reports the patient's BP was in normal range and denies any confusion, chest pain or SOB.   Do you have any pain since discharge?  Yes  Where: left scalp   Rating on pain scale 1-10, 10 being the worst pain you have ever experienced, what is current pain: 7  Is the pain manageable at home?  Yes  How well was your pain managed while in the hospital?   On a scale of 1-5   1- Very Poor and 5- Very well   Very Well  When you were leaving the hospital were your discharge instructions reviewed with you? Yes  How well were your discharge instructions explained to you?   On a scale of 1-5   1- Very Poor and 5- Very well   Very Well  Do you have any questions about your discharge instructions?  No  Before leaving the hospital was your diagnoses explained to you? Yes  Do you have any questions about your diagnoses? No  Are you able to perform normal daily activities of living as you have prior to your hospital stay (dressing, bathing, ambulating to the bathroom, etc)? yes  (NCM) Was patient given a different diet per AVS? no      Medications: NCM did confirm the patient has discontinued the following as directed:  STOP taking:  ciprofloxacin 500 MG Tabs (Cipro)  clindamycin 300 MG Caps (Cleocin)  Current Outpatient Medications   Medication Sig Dispense Refill    amoxicillin clavulanate 875-125 MG Oral Tab Take 1 tablet by mouth 2 (two) times daily for 14 days. 28 tablet 0    HYDROcodone-acetaminophen 5-325 MG Oral Tab Take 1 tablet by mouth every 6 (six) hours as needed for Pain. 15 tablet 0    metFORMIN HCl 1000 MG Oral Tab Take 1 tablet (1,000 mg total) by mouth 2 (two) times daily with meals. 180 tablet 3    insulin glargine 100 UNIT/ML Subcutaneous Solution Inject 60 Units into the skin nightly. 10 mL 5    levothyroxine 125 MCG Oral Tab Take 1 tablet (125 mcg total) by mouth before breakfast. 90 tablet 3    rosuvastatin 20 MG Oral Tab Take 1 tablet (20 mg total) by mouth nightly. 90 tablet 3    aspirin 81 MG Oral Tab EC Take 1 tablet (81 mg total) by mouth daily. 90 tablet 3    isosorbide mononitrate ER 30 MG Oral Tablet 24 Hr Take 1 tablet (30 mg total) by mouth daily. 90 tablet 3    ezetimibe 10 MG Oral Tab Take 1 tablet (10 mg total) by mouth nightly. 90 tablet 3    amLODIPine 10 MG Oral Tab Take 1 tablet  (10 mg total) by mouth daily. 90 tablet 3    losartan 25 MG Oral Tab Take 1 tablet (25 mg total) by mouth daily. 90 tablet 3    furosemide 40 MG Oral Tab Take 1 tablet (40 mg total) by mouth 2 (two) times daily. 90 tablet 3    Cholecalciferol (VITAMIN D3) 1.25 MG (17094 UT) Oral Cap Take 1 capsule by mouth once a week.      insulin lispro (HUMALOG) 100 UNIT/ML Subcutaneous Solution Inject 20 Units into the skin in the morning, at noon, and at bedtime. 60 mL 2     Were there any changes to your current medication(s) noted on the AVS? Yes  If so, were these medication changes discussed with you prior to leaving the hospital? Yes  If a new medication was prescribed:    Was the new medication's purpose & side effects reviewed? Yes  Do you have any questions about your new medication? No; NCM did stress the importance of completing the antibiotic medication as prescribed regardless of symptom improvement.     Did you  your discharge medications when you left the hospital? Yes  Let's go over your medications together to make sure we are not missing anything. Medications Reviewed  Are there any reasons that keep you from taking your medication as prescribed? No  Are you having any concerns with constipation? No  Did patient receive their flu shot (Sept-March)? Yes    Discharge medications reviewed/discussed/and reconciled against outpatient medications with patient.  Any changes or updates to medications sent to PCP.  Patient Acknowledged     Referrals/orders at D/C:  Referrals/orders placed at D/C? no    Except for Home Health Services mentioned above, have you scheduled these other services? No    DME ordered at D/C? No      Discharge orders, AVS reviewed and discussed with patient. Any changes or updates to orders sent to PCP.  Patient Acknowledged      SDOH:   Transportation:   Transportation Needs: No Transportation Needs (1/9/2024)    Transportation Needs     Lack of Transportation: No       Financial Strain:    Financial Resource Strain: Low Risk  (1/9/2024)    Financial Resource Strain     Difficulty of Paying Living Expenses: Not hard at all     Med Affordability: No       Follow up appointments:      Your appointments       Date & Time Appointment Department ([place])    Jan 12, 2024  2:45 PM [facility] Follow Up with Kaleb Marie MD [facility] ([place] [address])              [address] [address]  [phone]            TCC  Was TCC ordered: No      PCP (If no TCC appointment)  Does patient already have a PCP appointment scheduled? Yes; 1/12/2024  NCM Confirmed PCP office TCM appointment with patient on 1/12/2024 with Dr. Marie.      Specialist    Does the patient have any other follow up appointment(s) needing to be scheduled? Yes  If yes: NCM reviewed upcoming specialist appointment with patient: Yes  The patient has an appointment with Dr. Novak this afternoon, 1/9/2024.     Is there any reason as to why you cannot make your appointment(s)?  No     Needs post D/C:   Now that you are home, are there any needs or concerns you need addressed before your next visit with your PCP?  (DME, meds, questions, etc.): Yes A TE was sent to the PCP office reporting the patient's home glucose readings since hospital discharge.   The spouse reported the patient has had the following glucose readings since discharge:   1/6 162(fasting)   243 (after lunch)   371 (after dinner  1/7 187 (fasting)   399 (2 hours after lunch)   325 (2 hours after dinner)   212 (in the evening)  1/8 182 (fasting)   244 (in the afternoon)   138 (3pm)   321 (after dinner)   400 (2 hours after dinner)  1/9 93 (fasting)    The patient has been following a strict DM diet at home.     NCM did confirm the patient is taking the following for DM management:     metFORMIN HCl 1000 MG Oral Tab 180 tablet 3  5/11/2023 --    Sig - Route: Take 1 tablet (1,000 mg total) by mouth 2 (two) times daily with meals.      insulin lispro (HUMALOG) 100 UNIT/ML Subcutaneous Solution 60 mL 2 4/18/2023 --   Sig:   Inject 20 Units into the skin in the morning, at noon, and at bedtime.       The patient reported taking 50 units of insuling glargine nightly. NCM did review the prescribed dosing:     insulin glargine 100 UNIT/ML Subcutaneous Solution 10 mL 5 5/11/2023 --   Sig:   Inject 60 Units into the skin nightly.         The patient/spouse are aware the office will follow up directly.     Interventions by NCM:    Reviewed all discharge instructions with the patient/spouse with a focus on wound care, pain management, DM diet and management and antibiotic therapy. All medications were reviewed and educated on the importance of taking the medications as directed.  Patient symptoms were assessed, education was completed for signs/symptoms to monitor, and reviewed when to contact providers vs go to the ED/call 911. Appointments were reviewed and stressed the importance of a close follow up with providers. The patient/spouse received the influenza vaccine this season. The patient verbalized understanding and will contact the office with any further questions or concerns.     Overall Rating:   How would you rate the care you received while in the hospital? excellent    CCM referral placed:    Yes    BOOK BY DATE: 1/21/2024

## 2024-01-12 NOTE — DISCHARGE SUMMARY
DISCHARGE SUMMARY     Samy Hoskins Patient Status:  Inpatient    1955 MRN [id number]   Location [facility] 5SW/SE Attending No att. providers found   Hosp Day # 6 PCP Kaleb Marie MD     Date of Admission: 2023  Date of Discharge: 2024  Discharge Disposition: Home or Self Care    Admitting Diagnosis:   Cochlear implant in place [Z96.21]  Cellulitis of head or scalp [L03.811]    Hospital Discharge Diagnoses: cellulitis scalp    TCM Diagnosis at discharge from Hospital: Other: cellulitis scalp; still recommend for TCM follow-up    Please note that only patients enrolled in the Medicare ACO, BCBS ACO and [facility] HMOs will be handled by a member of the Care Management Team.  For all other patients, please follow usual protocol for discharge care transition.    Lace+ Score: 47  59-90 High Risk  29-58 Medium Risk  0-28   Low Risk.    Risk of readmission: Samy Hoskins has Moderate Risk of readmission after discharge from the hospital.    History of Present Illness: Continues to improve will follow ID and ENT recommendations       Pt seen in room, feels better today, the are around the implant less tender      Will follow with id and ent      Continue with iv ab      Cellulitis of head or scalp  Seen by ENTs/p I @d, continue with IV antibiotic per id , pain medication as need.     Hypertension monitor blood pressure, continue with current medication     Diabetes type-on insulin continue with Accu-Chek sliding scale insulin, hypoglycemia protocol  Bs elevated, will adjust treatment, spoke with pt and nurse on strict diabetic diet       Hypercholesterolemia continue with home medication     Cochlear implant in place he will need to follow-up with his ENT surgeon     GI and DVT prophylax       Brief Synopsis:     Discharge Physical Exam: General appearance: alert, appears stated age, and cooperative  INCISION/WOUND: dressing intact and in place  Nose: Nares normal. Septum midline. Mucosa normal. No  drainage or sinus tenderness.  Pulmonary:  clear to auscultation bilaterally  Cardiovascular: S1, S2 normal, no murmur, click, rub or gallop, regular rate and rhythm  Extremities: extremities normal, atraumatic, no cyanosis or edema  Neurologic: Grossly normal    Procedures during hospitalization:         Incidental or significant findings and recommendations (brief descriptions):      Lab/Test results pending at Discharge:       Consultants:      Discharge Medication List:     Discharge Medications        START taking these medications        Instructions Prescription details   amoxicillin clavulanate 875-125 MG Tabs  Commonly known as: Augmentin      Take 1 tablet by mouth 2 (two) times daily for 14 days.   Stop taking on: January 18, 2024  Quantity: 28 tablet  Refills: 0            CONTINUE taking these medications        Instructions Prescription details   amLODIPine 10 MG Tabs  Commonly known as: Norvasc      Take 1 tablet (10 mg total) by mouth daily.   Quantity: 90 tablet  Refills: 3     aspirin 81 MG Tbec      Take 1 tablet (81 mg total) by mouth daily.   Quantity: 90 tablet  Refills: 3     ezetimibe 10 MG Tabs  Commonly known as: Zetia      Take 1 tablet (10 mg total) by mouth nightly.   Quantity: 90 tablet  Refills: 3     furosemide 40 MG Tabs  Commonly known as: Lasix      Take 1 tablet (40 mg total) by mouth 2 (two) times daily.   Quantity: 90 tablet  Refills: 3     HumaLOG 100 UNIT/ML Soln  Generic drug: insulin lispro      Inject 20 Units into the skin in the morning, at noon, and at bedtime.   Quantity: 60 mL  Refills: 2     HYDROcodone-acetaminophen 5-325 MG Tabs  Commonly known as: Norco      Take 1 tablet by mouth every 6 (six) hours as needed for Pain.   Quantity: 15 tablet  Refills: 0     insulin glargine 100 UNIT/ML Soln  Commonly known as: Lantus      Inject 60 Units into the skin nightly.   Quantity: 10 mL  Refills: 5     isosorbide mononitrate ER 30 MG Tb24  Commonly known as: Imdur      Take  1 tablet (30 mg total) by mouth daily.   Quantity: 90 tablet  Refills: 3     levothyroxine 125 MCG Tabs  Commonly known as: Synthroid      Take 1 tablet (125 mcg total) by mouth before breakfast.   Quantity: 90 tablet  Refills: 3     losartan 25 MG Tabs  Commonly known as: Cozaar      Take 1 tablet (25 mg total) by mouth daily.   Quantity: 90 tablet  Refills: 3     metFORMIN HCl 1000 MG Tabs  Commonly known as: GLUCOPHAGE      Take 1 tablet (1,000 mg total) by mouth 2 (two) times daily with meals.   Quantity: 180 tablet  Refills: 3     rosuvastatin 20 MG Tabs  Commonly known as: Crestor      Take 1 tablet (20 mg total) by mouth nightly.   Quantity: 90 tablet  Refills: 3     Vitamin D3 1.25 MG (37043 UT) Caps      Take 1 capsule by mouth once a week.   Refills: 0            STOP taking these medications      ciprofloxacin 500 MG Tabs  Commonly known as: Cipro        clindamycin 300 MG Caps  Commonly known as: Cleocin                  Where to Get Your Medications        These medications were sent to [id number] - [place], [place] - [address] [facility] [phone], [phone]  [address] [facility] [facility] [address]      Phone: [phone]   amoxicillin clavulanate 875-125 MG Tabs         Discharge Plan:  Discharge Condition:     Discharge Medication List as of 1/5/2024  1:55 PM        New Orders    Details   amoxicillin clavulanate 875-125 MG Oral Tab Take 1 tablet by mouth 2 (two) times daily for 14 days., Normal, Disp-28 tablet, R-0           Home Meds - Unchanged    Details   HYDROcodone-acetaminophen 5-325 MG Oral Tab Take 1 tablet by mouth every 6 (six) hours as needed for Pain., Normal, Disp-15 tablet, R-0      metFORMIN HCl 1000 MG Oral Tab Take 1 tablet (1,000 mg total) by mouth 2 (two) times daily with meals., Normal, Disp-180 tablet, R-3      levothyroxine 125 MCG Oral Tab Take 1 tablet (125 mcg total) by mouth before breakfast., Normal, Disp-90 tablet, R-3      rosuvastatin 20 MG Oral Tab Take 1 tablet  (20 mg total) by mouth nightly., Normal, Disp-90 tablet, R-3      aspirin 81 MG Oral Tab EC Take 1 tablet (81 mg total) by mouth daily., Normal, Disp-90 tablet, R-3      isosorbide mononitrate ER 30 MG Oral Tablet 24 Hr Take 1 tablet (30 mg total) by mouth daily., Normal, Disp-90 tablet, R-3      ezetimibe 10 MG Oral Tab Take 1 tablet (10 mg total) by mouth nightly., Normal, Disp-90 tablet, R-3      amLODIPine 10 MG Oral Tab Take 1 tablet (10 mg total) by mouth daily., Normal, Disp-90 tablet, R-3      losartan 25 MG Oral Tab Take 1 tablet (25 mg total) by mouth daily., Normal, Disp-90 tablet, R-3      furosemide 40 MG Oral Tab Take 1 tablet (40 mg total) by mouth 2 (two) times daily., Normal, Disp-90 tablet, R-3      Cholecalciferol (VITAMIN D3) 1.25 MG (08334 UT) Oral Cap Take 1 capsule by mouth once a week., Historical      insulin glargine 100 UNIT/ML Subcutaneous Solution Inject 60 Units into the skin nightly., Normal, Disp-10 mL, R-5      insulin lispro (HUMALOG) 100 UNIT/ML Subcutaneous Solution Inject 20 Units into the skin in the morning, at noon, and at bedtime., Normal, Disp-60 mL, R-2                 Discharge 2 gr na 1800 ada diet      Discharge Activity: As tolerated    Follow-up appointment:   Kaleb Marie MD  [address] [place] [id number]  [phone]    Follow up in 1 week(s)        Vital signs:       -----------------------------------------------------------------------------------------------  PATIENT DISCHARGE INSTRUCTIONS: See electronic chart    Kaleb Marie MD 1/12/2024    Time spent:  30 to 74 minutes

## 2024-01-16 ENCOUNTER — OFFICE VISIT (OUTPATIENT)
Dept: INTERNAL MEDICINE CLINIC | Facility: CLINIC | Age: 69
End: 2024-01-16

## 2024-01-16 VITALS
WEIGHT: 217 LBS | TEMPERATURE: 98 F | HEIGHT: 71 IN | RESPIRATION RATE: 17 BRPM | SYSTOLIC BLOOD PRESSURE: 118 MMHG | DIASTOLIC BLOOD PRESSURE: 66 MMHG | BODY MASS INDEX: 30.38 KG/M2 | HEART RATE: 65 BPM | OXYGEN SATURATION: 98 %

## 2024-01-16 DIAGNOSIS — E11.65 TYPE 2 DIABETES MELLITUS WITH HYPERGLYCEMIA, WITH LONG-TERM CURRENT USE OF INSULIN (HCC): ICD-10-CM

## 2024-01-16 DIAGNOSIS — Z96.21 COCHLEAR IMPLANT IN PLACE: ICD-10-CM

## 2024-01-16 DIAGNOSIS — Z09 HOSPITAL DISCHARGE FOLLOW-UP: Primary | ICD-10-CM

## 2024-01-16 DIAGNOSIS — Z79.4 TYPE 2 DIABETES MELLITUS WITH HYPERGLYCEMIA, WITH LONG-TERM CURRENT USE OF INSULIN (HCC): ICD-10-CM

## 2024-01-16 DIAGNOSIS — R42 DIZZY SPELLS: ICD-10-CM

## 2024-01-16 DIAGNOSIS — I25.10 CORONARY ARTERY DISEASE INVOLVING NATIVE CORONARY ARTERY OF NATIVE HEART WITHOUT ANGINA PECTORIS: ICD-10-CM

## 2024-01-21 NOTE — PROGRESS NOTES
Subjective:     Patient ID: Samy Hoskins is a 68 year old male.    HPI    Patient comes in for follow-up after he was admitted to the hospital with cochlear implant area cellulitis was treated with IV antibiotic and switch to p.o. antibiotic patient did see his ENT where he follows placed the implant initially he is doing okay overall has a follow-up appointment patient after discharge and after ER again with dizziness was given meclizine feeling better     History/Other:   Review of Systems   Constitutional: Negative.    HENT: Negative.     Eyes: Negative.    Respiratory: Negative.     Cardiovascular: Negative.    Gastrointestinal: Negative.    Genitourinary: Negative.    Musculoskeletal: Negative.    Skin: Negative.    Neurological: Negative.    Psychiatric/Behavioral: Negative.       Current Outpatient Medications   Medication Sig Dispense Refill    metFORMIN HCl 1000 MG Oral Tab Take 1 tablet (1,000 mg total) by mouth 2 (two) times daily with meals. 180 tablet 3    insulin glargine 100 UNIT/ML Subcutaneous Solution Inject 60 Units into the skin nightly. 10 mL 5    levothyroxine 125 MCG Oral Tab Take 1 tablet (125 mcg total) by mouth before breakfast. 90 tablet 3    rosuvastatin 20 MG Oral Tab Take 1 tablet (20 mg total) by mouth nightly. 90 tablet 3    aspirin 81 MG Oral Tab EC Take 1 tablet (81 mg total) by mouth daily. 90 tablet 3    isosorbide mononitrate ER 30 MG Oral Tablet 24 Hr Take 1 tablet (30 mg total) by mouth daily. 90 tablet 3    ezetimibe 10 MG Oral Tab Take 1 tablet (10 mg total) by mouth nightly. 90 tablet 3    amLODIPine 10 MG Oral Tab Take 1 tablet (10 mg total) by mouth daily. 90 tablet 3    losartan 25 MG Oral Tab Take 1 tablet (25 mg total) by mouth daily. 90 tablet 3    furosemide 40 MG Oral Tab Take 1 tablet (40 mg total) by mouth 2 (two) times daily. 90 tablet 3    insulin lispro (HUMALOG) 100 UNIT/ML Subcutaneous Solution Inject 20 Units into the skin in the morning, at noon, and at  bedtime. 60 mL 2     Allergies:  Allergies   Allergen Reactions    Shrimp OTHER (SEE COMMENTS)     Throat tightness       Past Medical History:   Diagnosis Date    Atherosclerosis of coronary artery     Deaf, right     Diabetes (HCC)     Essential hypertension     Hyperlipidemia     Thyroid disease       Past Surgical History:   Procedure Laterality Date    CORONARY STENT PLACEMENT      ROTATOR CUFF REPAIR Left       No family history on file.   Social History:   Social History     Socioeconomic History    Marital status:    Tobacco Use    Smoking status: Former     Passive exposure: Never    Smokeless tobacco: Never   Vaping Use    Vaping Use: Never used   Substance and Sexual Activity    Alcohol use: Not Currently    Drug use: Never     Social Determinants of Health     Financial Resource Strain: Low Risk  (1/9/2024)    Financial Resource Strain     Difficulty of Paying Living Expenses: Not hard at all     Med Affordability: No   Food Insecurity: No Food Insecurity (12/30/2023)    Food Insecurity     Food Insecurity: Never true   Transportation Needs: No Transportation Needs (1/9/2024)    Transportation Needs     Lack of Transportation: No   Housing Stability: Low Risk  (12/30/2023)    Housing Stability     Housing Instability: No        Objective:   Physical Exam  Vitals and nursing note reviewed.   Constitutional:       Appearance: He is well-developed.   HENT:      Head: Normocephalic and atraumatic.      Right Ear: External ear normal.      Left Ear: External ear normal.      Nose: Nose normal.   Eyes:      Conjunctiva/sclera: Conjunctivae normal.      Pupils: Pupils are equal, round, and reactive to light.   Cardiovascular:      Rate and Rhythm: Normal rate and regular rhythm.      Heart sounds: Normal heart sounds.   Pulmonary:      Effort: Pulmonary effort is normal.      Breath sounds: Normal breath sounds.   Abdominal:      General: Bowel sounds are normal.      Palpations: Abdomen is soft.    Genitourinary:     Penis: Normal.       Prostate: Normal.      Rectum: Normal.   Musculoskeletal:         General: Normal range of motion.      Cervical back: Normal range of motion and neck supple.   Skin:     General: Skin is warm and dry.   Neurological:      Mental Status: He is alert and oriented to person, place, and time.      Deep Tendon Reflexes: Reflexes are normal and symmetric.         Assessment & Plan:   1. Hospital discharge follow-up -doing okay overall follows with his specialist   2. Type 2 diabetes mellitus with hyperglycemia, with long-term current use of insulin ([facility]) will need better sugar control will refer to diabetic educator   3. Coronary artery disease involving native coronary artery of native heart without angina pectoris medical management will refer to cardiology   4. Dizzy spells improved as needed use meclizine   5. Cochlear implant in place stable follows with ENT       No orders of the defined types were placed in this encounter.      Meds This Visit:  Requested Prescriptions      No prescriptions requested or ordered in this encounter       Imaging & Referrals:  DIABETIC EDUCATION - INTERNAL  CARDIO - INTERNAL

## 2024-01-29 NOTE — TELEPHONE ENCOUNTER
TCM-HFU appointment completed with Dr. Marie on 1/16/2024. [facility] closing encounter.

## 2024-02-16 ENCOUNTER — NURSE TRIAGE (OUTPATIENT)
Facility: CLINIC | Age: 69
End: 2024-02-16

## 2024-02-16 NOTE — TELEPHONE ENCOUNTER
Action Requested: Summary for Provider     []  Critical Lab, Recommendations Needed  [] Need Additional Advice  [x]   FYI    []   Need Orders  [] Need Medications Sent to Pharmacy  []  Other     SUMMARY: Elevated glucose and blurred vision and dizziness present. Advised IC now--> agreeable. Refer to system/assessment protocol for yes/no answers. [See below for more details]     Reason for call: Hyperglycemia and Blurred Vision  Onset: 3 days    Reason for Disposition   Patient wants to be seen    Protocols used: Diabetes - High Blood Sugar-A-OH      Patient called in [facility] states his glucose has increased. Patient is with family members at this time. He also has some blurred vision. He has also felt some dizziness. Last glucose check was now: 279, he did eat today. He reports some increased glucose values that go up to 400's recently. Denies any shortness of breath, chest pain, and/or chest tightness or rapid breathing. Denies any paresthesias or weakness. He was advised to go to  [facility] for an immediate evaluation--> agreeable. He also scheduled a follow visit for Monday with PCP. Patient instructed any new or worsening symptoms [reviewed] seek immediate medical attention--> ED/91. Patient verbalized understanding. No further questions or concerns at this time.    Future Appointments   Date Time Provider Department Center   2/19/2024  2:30 PM [facility]

## 2024-02-19 ENCOUNTER — OFFICE VISIT (OUTPATIENT)
Dept: INTERNAL MEDICINE CLINIC | Facility: CLINIC | Age: 69
End: 2024-02-19

## 2024-02-19 VITALS
TEMPERATURE: 98 F | HEIGHT: 71 IN | OXYGEN SATURATION: 98 % | HEART RATE: 78 BPM | BODY MASS INDEX: 30.66 KG/M2 | DIASTOLIC BLOOD PRESSURE: 64 MMHG | WEIGHT: 219 LBS | RESPIRATION RATE: 19 BRPM | SYSTOLIC BLOOD PRESSURE: 136 MMHG

## 2024-02-19 DIAGNOSIS — Z00.00 ANNUAL PHYSICAL EXAM: Primary | ICD-10-CM

## 2024-02-19 DIAGNOSIS — E78.2 MIXED HYPERLIPIDEMIA: ICD-10-CM

## 2024-02-19 DIAGNOSIS — E11.9 TYPE 2 DIABETES MELLITUS WITHOUT COMPLICATION, WITH LONG-TERM CURRENT USE OF INSULIN (HCC): ICD-10-CM

## 2024-02-19 DIAGNOSIS — H26.8 OTHER CATARACT OF BOTH EYES: ICD-10-CM

## 2024-02-19 DIAGNOSIS — Z79.4 TYPE 2 DIABETES MELLITUS WITHOUT COMPLICATION, WITH LONG-TERM CURRENT USE OF INSULIN (HCC): ICD-10-CM

## 2024-02-19 DIAGNOSIS — I25.10 CORONARY ARTERY DISEASE INVOLVING NATIVE CORONARY ARTERY OF NATIVE HEART WITHOUT ANGINA PECTORIS: ICD-10-CM

## 2024-02-19 DIAGNOSIS — I10 PRIMARY HYPERTENSION: ICD-10-CM

## 2024-02-19 PROBLEM — H26.9 CATARACTA: Status: ACTIVE | Noted: 2024-02-19

## 2024-02-19 RX ORDER — EMPAGLIFLOZIN 25 MG/1
25 TABLET, FILM COATED ORAL DAILY
COMMUNITY
Start: 2023-12-19

## 2024-02-19 NOTE — PROGRESS NOTES
Subjective:     Patient ID: Samy Hoskins is a 68 year old male.    HPI  Pt comes in for annual exam, complaints of BS being high during the day and then in am there have been few times where his BS are dropping in the 50-60-70 . Pt admits that he can do better with hi diet. Pt also seen eye specialist and told he has cataract and also some bleeding behind the eye and he should see a retinal; specialist. Pt had the cochlear implant removed so more hard of hearing. Pt follows with cardiology.     History/Other:   Review of Systems   Constitutional: Negative.    HENT: Negative.     Eyes: Negative.    Respiratory: Negative.     Cardiovascular: Negative.    Gastrointestinal: Negative.    Genitourinary: Negative.    Musculoskeletal: Negative.    Skin: Negative.    Neurological: Negative.    Psychiatric/Behavioral: Negative.       Current Outpatient Medications   Medication Sig Dispense Refill    Empagliflozin (JARDIANCE) 25 MG Oral Tab Take 25 mg by mouth daily.      metFORMIN HCl 1000 MG Oral Tab Take 1 tablet (1,000 mg total) by mouth 2 (two) times daily with meals. 180 tablet 3    insulin glargine 100 UNIT/ML Subcutaneous Solution Inject 60 Units into the skin nightly. 10 mL 5    levothyroxine 125 MCG Oral Tab Take 1 tablet (125 mcg total) by mouth before breakfast. 90 tablet 3    rosuvastatin 20 MG Oral Tab Take 1 tablet (20 mg total) by mouth nightly. 90 tablet 3    aspirin 81 MG Oral Tab EC Take 1 tablet (81 mg total) by mouth daily. 90 tablet 3    isosorbide mononitrate ER 30 MG Oral Tablet 24 Hr Take 1 tablet (30 mg total) by mouth daily. 90 tablet 3    ezetimibe 10 MG Oral Tab Take 1 tablet (10 mg total) by mouth nightly. 90 tablet 3    amLODIPine 10 MG Oral Tab Take 1 tablet (10 mg total) by mouth daily. 90 tablet 3    losartan 25 MG Oral Tab Take 1 tablet (25 mg total) by mouth daily. 90 tablet 3    insulin lispro (HUMALOG) 100 UNIT/ML Subcutaneous Solution Inject 20 Units into the skin in the morning, at noon,  and at bedtime. 60 mL 2     Allergies:  Allergies   Allergen Reactions    Shrimp OTHER (SEE COMMENTS)     Throat tightness       Past Medical History:   Diagnosis Date    Atherosclerosis of coronary artery     Deaf, right     Diabetes (HCC)     Essential hypertension     Hyperlipidemia     Thyroid disease       Past Surgical History:   Procedure Laterality Date    CORONARY STENT PLACEMENT      ROTATOR CUFF REPAIR Left       No family history on file.   Social History:   Social History     Socioeconomic History    Marital status:    Tobacco Use    Smoking status: Former     Passive exposure: Never    Smokeless tobacco: Never   Vaping Use    Vaping Use: Never used   Substance and Sexual Activity    Alcohol use: Not Currently    Drug use: Never     Social Determinants of Health     Financial Resource Strain: Low Risk  (1/9/2024)    Financial Resource Strain     Difficulty of Paying Living Expenses: Not hard at all     Med Affordability: No   Food Insecurity: No Food Insecurity (12/30/2023)    Food Insecurity     Food Insecurity: Never true   Transportation Needs: No Transportation Needs (1/9/2024)    Transportation Needs     Lack of Transportation: No   Housing Stability: Low Risk  (12/30/2023)    Housing Stability     Housing Instability: No        Objective:   Physical Exam  Vitals and nursing note reviewed.   Constitutional:       Appearance: He is well-developed.   HENT:      Head: Normocephalic and atraumatic.      Right Ear: External ear normal.      Left Ear: External ear normal.      Nose: Nose normal.   Eyes:      Conjunctiva/sclera: Conjunctivae normal.      Pupils: Pupils are equal, round, and reactive to light.   Cardiovascular:      Rate and Rhythm: Normal rate and regular rhythm.      Heart sounds: Normal heart sounds.   Pulmonary:      Effort: Pulmonary effort is normal.      Breath sounds: Normal breath sounds.   Abdominal:      General: Bowel sounds are normal.      Palpations: Abdomen is soft.    Genitourinary:     Penis: Normal.       Prostate: Normal.      Rectum: Normal.   Musculoskeletal:         General: Normal range of motion.      Cervical back: Normal range of motion and neck supple.   Skin:     General: Skin is warm and dry.   Neurological:      Mental Status: He is alert and oriented to person, place, and time.      Deep Tendon Reflexes: Reflexes are normal and symmetric.         Assessment & Plan:   1. Annual physical exam - exam ok    2. Type 2 diabetes mellitus without complication, with long-term current use of insulin (HCC) - will cut down on the evening dose of short acting insuline to 10 un and cut down on the levamire to 50u due to recurrent  low bs in am, watch diet dfollow with diab educvator and endocrine    3. Coronary artery disease involving native coronary artery of native heart without angina pectoris    Htn- well controlled   Hyperlipidemia- will retest, continue with current care   Cataracts both eye, wlll check with insurance to find retinal specialist   Diarrhea - probably due to Metformin, cut don on the metformin to 1/2 tab bid, will follow     Orders Placed This Encounter   Procedures    CBC With Differential With Platelet    Comp Metabolic Panel (14)    Lipid Panel    TSH W Reflex To Free T4    Urinalysis, Routine    PSA Total, Screen    Hemoglobin A1C    Microalb/Creat Ratio, Random Urine       Meds This Visit:  Requested Prescriptions      No prescriptions requested or ordered in this encounter       Imaging & Referrals:  ENDOCRINOLOGY - INTERNAL  DIABETIC EDUCATION - INTERNAL

## 2024-02-26 ENCOUNTER — TELEPHONE (OUTPATIENT)
Dept: ENDOCRINOLOGY | Facility: HOSPITAL | Age: 69
End: 2024-02-26

## 2024-02-26 ENCOUNTER — HOSPITAL ENCOUNTER (OUTPATIENT)
Dept: ENDOCRINOLOGY | Facility: HOSPITAL | Age: 69
End: 2024-02-26
Attending: INTERNAL MEDICINE
Payer: MEDICAID

## 2024-02-26 VITALS — BODY MASS INDEX: 29.77 KG/M2 | WEIGHT: 212.63 LBS | HEIGHT: 71 IN

## 2024-02-26 DIAGNOSIS — E11.9 TYPE 2 DIABETES MELLITUS WITHOUT COMPLICATION, WITH LONG-TERM CURRENT USE OF INSULIN (HCC): Primary | ICD-10-CM

## 2024-02-26 DIAGNOSIS — Z79.4 TYPE 2 DIABETES MELLITUS WITHOUT COMPLICATION, WITH LONG-TERM CURRENT USE OF INSULIN (HCC): Primary | ICD-10-CM

## 2024-02-26 NOTE — TELEPHONE ENCOUNTER
Your patient Samy Hoskins (4/4/1955) was recently seen at the [facility] for education.  Due to hearing limitations and multiple daily injections of insulin, I am requesting a new order for 1:1 appointments.      If you are in agreement with this plan, please sign then pended order within [facility] for Intensive individual classes at [facility]. If you would prefer the education to NOT adjust insulin medication if appropriate, please adjust the order.     Thank you for your assistance. Please call with any questions.    Nathaly Merritt RN BSN [facility]  [phone]

## 2024-02-26 NOTE — PROGRESS NOTES
Samy Hoskins : 1955  attended individual initial assessment for Diabetes Education:    Date: 2024         Start time: 805 End time: 905    HgbA1C (%)   Date Value   2023 9.7 (H)       Wt Readings from Last 1 Encounters:   24 212 lb 9.6 oz       Assessment:     Samy currently administers long and rapid acting insulin for diabetes management. He also indicates Metformin dose was recently reduced.  He reports he is experiencing lower glucose values between the hours of 1 AM and 5 AM with unknown causes (long acting insulin recently reduced). He does administer a correction dose of 10 units of rapid acting insulin in the evening if glucose values > 280-300 mg/dL. Unsure of rapid acting insulin or long acting insulin dose contributing to overnight low glucose values.  He currently wear a Jessica device and review of past 14 days indicate. Adjust to Jessica settings changed to TIR  mg/dL.  Average glucose 227 mg/dL  Time in Range : 29% (goal >70%)  Time above Range: 64% (40% > 240 mg/dL)  Time below Range: 1%    Diet Hx:   Currently consumes 3 meals and 3 snacks per day. Snacks include cheese/crackers, yogurt, fruit or nut  Breakfast can include: sweetened oatmeal with milk, egg with toast, toast with peanut butter and small amount of honey  Lunch: 1/2 sandwich or 2 tacos with beef or chicken  Dinner: varies however he does not eat tortillas if consuming rice or beans.    Education provided on the below topics:     Diabetes Overview:  Pathophysiology, A1C results and treatment options for diabetes self-management reviewed.   Described basic process and treatment options for diabetes self-management.  Introduced long term impact of uncontrolled blood glucose on health.    Healthy Eating:  Obtained usual diet history.  Instructed on Basic Diet Guidelines which includes eating 3 meals/day. Eat moderate amounts at consistent times from day to day. Limit/avoid sweets. Instructed on Balanced Plate  Method of meal planning. Instructed to limit grains or starchy vegetables to ¼ of plate, protein to ¼ of plate, non-starchy vegetables to ½ plate and modest portions of fruit, yogurt, milk as desired.    Being Active:   Encouraged Physical Activity and briefly reviewed ADA recommended guidelines for activity with type 2 diabetes. He currently walks for 30 minutes 3 or more times per week.    Taking Medications:   Metformin  Lantus  Humalog    Monitoring:  Instructed/reinforced blood glucose monitoring, testing schedules and target goals:   Fasting / Pre-meal  mg/dL 2 Hour Post-prandial <180 mg/dL   Currently wearing Jessica CGM device. Instructed on Time in Range goal.    Problem Solving:   Prevention, detection and treatment of acute complications: taught symptoms of hypoglycemia, hyperglycemia, how to treat low blood sugar (Rule of 15) and actions for lowering high blood glucose levels.   He does not carry treatment for low glucose with him at all times.     Behavior Change:  Initiated individualized goal setting process and will continue to refine throughout class series.    Recommendations:      Monitor blood glucose as directed.  Follow Balance Plate method of meal planning.   Attend all individual appointments   Carry low glucose treatment with at all times  Provide rapid acting insulin 15 minutes prior to meals  Provide intensive records with him for next appointment.      New referral/order requested to MD.  Written materials provided for all areas covered.  Patient verbalized understanding and all questions answered.    Nathaly Merritt RN

## 2024-02-26 NOTE — PATIENT INSTRUCTIONS
Goals       1.  EDC - Healthy (pt-stated)       Note created  2/26/2024  8:56 AM by Nathaly Merritt, RN      I will provide 5 days of food, blood sugar and insulin records for next visit        2.  EDC - Medications (pt-stated)       Note created  2/26/2024  8:57 AM by Nathaly Merritt, RN      I will administer Humalog insulin 15 minutes before a meal.      3.  EDC - Problem Solving (pt-stated)       Note created  2/26/2024  8:56 AM by Nathaly Merritt, RN      I will carry treatment for low blood sugar with me at all times

## 2024-03-01 ENCOUNTER — TELEPHONE (OUTPATIENT)
Dept: INTERNAL MEDICINE CLINIC | Facility: CLINIC | Age: 69
End: 2024-03-01

## 2024-03-07 ENCOUNTER — HOSPITAL ENCOUNTER (OUTPATIENT)
Dept: ENDOCRINOLOGY | Facility: HOSPITAL | Age: 69
Discharge: HOME | End: 2024-03-07
Attending: INTERNAL MEDICINE
Payer: MEDICAID

## 2024-03-07 DIAGNOSIS — Z79.4 TYPE 2 DIABETES MELLITUS WITHOUT COMPLICATION, WITH LONG-TERM CURRENT USE OF INSULIN (HCC): Primary | ICD-10-CM

## 2024-03-07 DIAGNOSIS — E11.9 TYPE 2 DIABETES MELLITUS WITHOUT COMPLICATION, WITH LONG-TERM CURRENT USE OF INSULIN (HCC): Primary | ICD-10-CM

## 2024-03-07 NOTE — PROGRESS NOTES
Samy Hoskins 4/4/1955 attended for Intensive Individual Management:    Date: 3/7/2024 Start Time: 0900 End Time: 1000    HgbA1C (%)   Date Value   12/31/2023 9.7 (H)     Wt Readings from Last 1 Encounters:   02/26/24 212 lb 9.6 oz       Assessment:  Samy provided food, insulin and blood sugar records for appointment. He reports he has reduced his portions for meals especially rice and tortillas.  He continues to experience higher glucose trends during the day which likely due to consuming fruits and yogurts as snacks between meals.  Review of Jessica data for past 14 days shows improvement compared to previous appointment.    Time in Range 41% (previous 29%)  Time above Range 59 %  (181-240 mg/dL 24% and > 240 mg/dL 35%)  Time Below Range 0%    Average glucose 212 mg/dL    He denies experiencing lower blood sugar values overnight. He indicates lowest glucose reading was 70 mg/dl today upon waking.    Education:       Healthy Eating  Reviewed and reinforced macronutrients, carbohydrate counting, and meal planning.   Discussed basic nutrition for diabetes management.  Instructed on carbohydrate counting, meal planning and food label reading.  Recommend he consume 45 grams of carbohydrates with each meal.    Being Active  Currently exercising: yes  Reinforced the benefits of exercise and safety precautions.  Encouraged Physical Activity and briefly reviewed ADA recommended guidelines for activity.  Instructed on food and/or insulin adjustments for exercise.    Monitoring  Benefits and options for glucose monitoring, target BG goals, HgbA1C values.  Reinforced glucose monitoring schedules.  Currently testing/monitoring BG/CGM: fasting, before meals, after meals, and bedtime    Provided him with additional records to provide at next appointment.    Taking Medication    Instructed on use of Correction scale. Correction factor of 20  Provided him with instruction that include administering 20 units of Humalog insulin with  each meal.  He will treat with glucose tablets if glucose below 70 before a meal  He will reduce his scheduled rapid acting inulin by 2 units if glucose values range between  mg/dL before a meal  He will add additional units of insulin based on glucose value before the meals:  101-139 mg/dL  no additional insulin  140-159 mg/dL  + 1 unit  160-179 mg/dL   +2 units  180-199  mg/dL  +3 units  200-219 mg/dL   +4 units  220-239 mg/dL   +5 units  240-259  mgdL   +6 units  260-279  mg/dL   +7 units  >280  mg/dL        +8 units    We reviewed and practice understanding how to utilize correction scale with meals during visit.      Problem Solving:  Discussed signs, symptoms and treatment of hypoglycemia and hyperglycemia.  Reviewed management of hypoglycemia-he has purchased glucose tablets and will consume 4 tablets to treat hypoglycemia (previously treating with 1 tablet).  He is currently having issues with Jessica Sensor he placed last night.     Health Coping  Family involvement/support encouraged  Depression and diabetes reviewed.      Patient Goals/Recommendations: Patient chosen goals included in patient's instructions for today.    Pt verbalized understanding and has no further questions.    Follow up appointment set for: 3/28/24  Nathaly Merritt RN

## 2024-03-13 ENCOUNTER — TELEPHONE (OUTPATIENT)
Dept: INTERNAL MEDICINE CLINIC | Facility: CLINIC | Age: 69
End: 2024-03-13

## 2024-03-13 ENCOUNTER — MED REC SCAN ONLY (OUTPATIENT)
Dept: INTERNAL MEDICINE CLINIC | Facility: CLINIC | Age: 69
End: 2024-03-13

## 2024-03-13 RX ORDER — BLOOD-GLUCOSE SENSOR
1 EACH MISCELLANEOUS
COMMUNITY
End: 2024-03-13

## 2024-03-13 RX ORDER — BLOOD-GLUCOSE SENSOR
1 EACH MISCELLANEOUS
Qty: 6 EACH | Refills: 1 | Status: SHIPPED | OUTPATIENT
Start: 2024-03-13

## 2024-03-13 NOTE — TELEPHONE ENCOUNTER
Spoke with patient, wife Nurys  (  Name and  verified ) informed that RX was sent to [facility]     Nurys is very thankful

## 2024-03-13 NOTE — TELEPHONE ENCOUNTER
Patient called to request a new Freestyle Jessica . Patient states her spouse is currently using Freestyle Jessica 14 day and will like to know if they can get the new one freestyle Jessica 3. [facility] on file verified. Patient states [facility] sent a fax last week regarding Freestyle Jessica 3.

## 2024-03-28 ENCOUNTER — TELEPHONE (OUTPATIENT)
Dept: ENDOCRINOLOGY | Facility: HOSPITAL | Age: 69
End: 2024-03-28

## 2024-03-28 NOTE — TELEPHONE ENCOUNTER
Contacted patient regarding missed appointment today.  Reminded him of upcoming appointment on 4/8 with Endocrinologist.  He indicates his glucose values have improved however he has experienced 2 overnight lower glucose values due to unknown cause.  Appointment rescheduled to 4/25. He verbalized understanding.

## 2024-04-08 ENCOUNTER — TELEPHONE (OUTPATIENT)
Dept: ENDOCRINOLOGY CLINIC | Facility: CLINIC | Age: 69
End: 2024-04-08

## 2024-04-08 ENCOUNTER — OFFICE VISIT (OUTPATIENT)
Dept: ENDOCRINOLOGY CLINIC | Facility: CLINIC | Age: 69
End: 2024-04-08

## 2024-04-08 VITALS
HEIGHT: 71 IN | SYSTOLIC BLOOD PRESSURE: 115 MMHG | BODY MASS INDEX: 29.82 KG/M2 | WEIGHT: 213 LBS | RESPIRATION RATE: 18 BRPM | DIASTOLIC BLOOD PRESSURE: 57 MMHG | HEART RATE: 71 BPM

## 2024-04-08 DIAGNOSIS — E11.65 TYPE 2 DIABETES MELLITUS WITH HYPERGLYCEMIA, WITH LONG-TERM CURRENT USE OF INSULIN (HCC): Primary | ICD-10-CM

## 2024-04-08 DIAGNOSIS — E78.5 DYSLIPIDEMIA: ICD-10-CM

## 2024-04-08 DIAGNOSIS — Z79.4 TYPE 2 DIABETES MELLITUS WITH HYPERGLYCEMIA, WITH LONG-TERM CURRENT USE OF INSULIN (HCC): Primary | ICD-10-CM

## 2024-04-08 LAB
CARTRIDGE LOT#: ABNORMAL NUMERIC
GLUCOSE BLOOD: 100
HEMOGLOBIN A1C: 8.5 % (ref 4.3–5.6)
TEST STRIP LOT #: NORMAL NUMERIC

## 2024-04-08 RX ORDER — DULAGLUTIDE 0.75 MG/.5ML
0.75 INJECTION, SOLUTION SUBCUTANEOUS WEEKLY
Qty: 2 ML | Refills: 2 | Status: SHIPPED | OUTPATIENT
Start: 2024-04-08 | End: 2024-05-08

## 2024-04-08 NOTE — TELEPHONE ENCOUNTER
Hi!    Please see if any other GLP-1 is less expensive? I dont understand why Trulicity would be so expensive? I thought that this covered by Medicaid? Thank you!

## 2024-04-08 NOTE — H&P
Name: Samy Hoskins  Date: 4/08/2024    Referring Physician: No ref. provider found    HISTORY OF PRESENT ILLNESS   Samy Hoskins is a 69 year old male who presents for evaluation and management of type 2 diabetes. He was diagnosed with diabetes about 8 years ago.     Blood Glucose Today: 100  HbA1C or glycohemoglobin  8.5 today  Type 1 or Type 2?: Type 2  Medications for DM: Lantus 50 units qAM and Humalog 20 units tid with meals; Jardiance 25mg PO qday; Metformin 500mg PO qday.   Checking 4-5 times per day  Episodes of hypoglycemia: none    Dietary compliance: He is trying to eat more healthy    Exercise: he is walking more, about 45 minutes    Polyuria/polydipsia: none    Blurred vision: none    Flu Vaccine This Season: yes    Covid Vaccine: yes    REVIEW OF SYSTEMS  CV: Cardiovascular disease present: none         Hypertension present: at goal, on meds         Hyperlipidemia present: at goal (2021)         Peripheral Vascular Disease present: none    : Nephropathy present: MAC: unknown Creatinine: unknown     Neuro: Neuropathy present: none    Eyes: Diabetic retinopathy present: retinopathy bilaterally            Most recent visit to eye doctor in last 12 months: yes    Skin: Infection or ulceration: none    Osteoporosis/ Osteopenia: none Vitamin D: none    Thyroid disease: none TSH: none    Medications:     Current Outpatient Medications:     Dulaglutide (TRULICITY) 0.75 MG/0.5ML Subcutaneous Solution Pen-injector, Inject 0.75 mg into the skin once a week., Disp: 2 mL, Rfl: 2    Empagliflozin (JARDIANCE) 25 MG Oral Tab, Take 25 mg by mouth daily., Disp: , Rfl:     metFORMIN HCl 1000 MG Oral Tab, Take 1 tablet (1,000 mg total) by mouth 2 (two) times daily with meals. (Patient taking differently: Take 0.5 tablets (500 mg total) by mouth daily with breakfast.), Disp: 180 tablet, Rfl: 3    insulin glargine 100 UNIT/ML Subcutaneous Solution, Inject 60 Units into the skin nightly. (Patient taking differently: Inject  50 Units into the skin nightly.), Disp: 10 mL, Rfl: 5    levothyroxine 125 MCG Oral Tab, Take 1 tablet (125 mcg total) by mouth before breakfast., Disp: 90 tablet, Rfl: 3    rosuvastatin 20 MG Oral Tab, Take 1 tablet (20 mg total) by mouth nightly., Disp: 90 tablet, Rfl: 3    aspirin 81 MG Oral Tab EC, Take 1 tablet (81 mg total) by mouth daily., Disp: 90 tablet, Rfl: 3    isosorbide mononitrate ER 30 MG Oral Tablet 24 Hr, Take 1 tablet (30 mg total) by mouth daily., Disp: 90 tablet, Rfl: 3    ezetimibe 10 MG Oral Tab, Take 1 tablet (10 mg total) by mouth nightly., Disp: 90 tablet, Rfl: 3    amLODIPine 10 MG Oral Tab, Take 1 tablet (10 mg total) by mouth daily., Disp: 90 tablet, Rfl: 3    losartan 25 MG Oral Tab, Take 1 tablet (25 mg total) by mouth daily., Disp: 90 tablet, Rfl: 3    insulin lispro (HUMALOG) 100 UNIT/ML Subcutaneous Solution, Inject 20 Units into the skin in the morning, at noon, and at bedtime. (Patient taking differently: Inject 20 Units into the skin in the morning, at noon, and at bedtime. 10-20 units of insulin based on size of meal), Disp: 60 mL, Rfl: 2    Continuous Blood Gluc Sensor (FREESTYLE AJAY 3 SENSOR) Does not apply Misc, 1 each every 14 (fourteen) days. (Patient not taking: Reported on 4/8/2024), Disp: 6 each, Rfl: 1     Allergies:   Allergies   Allergen Reactions    Shrimp OTHER (SEE COMMENTS)     Throat tightness       Social History:   Social History     Socioeconomic History    Marital status:    Tobacco Use    Smoking status: Former     Passive exposure: Never    Smokeless tobacco: Never   Vaping Use    Vaping Use: Never used   Substance and Sexual Activity    Alcohol use: Not Currently    Drug use: Never     Social Determinants of Health     Financial Resource Strain: Low Risk  (1/9/2024)    Financial Resource Strain     Difficulty of Paying Living Expenses: Not hard at all     Med Affordability: No   Food Insecurity: No Food Insecurity (12/30/2023)    Food Insecurity      Food Insecurity: Never true   Transportation Needs: No Transportation Needs (1/9/2024)    Transportation Needs     Lack of Transportation: No   Housing Stability: Low Risk  (12/30/2023)    Housing Stability     Housing Instability: No       Medical History:   Past Medical History:   Diagnosis Date    Atherosclerosis of coronary artery     Deaf, right     Diabetes (HCC)     Essential hypertension     Hyperlipidemia     Thyroid disease        Surgical history:   Past Surgical History:   Procedure Laterality Date    CORONARY STENT PLACEMENT      ROTATOR CUFF REPAIR Left          PHYSICAL EXAM  Vitals:    04/08/24 1349   BP: 115/57   Pulse: 71   Resp: 18   Weight: 213 lb (96.6 kg)   Height: 5' 11\" (1.803 m)       General Appearance:  alert, well developed, in no acute distress  Eyes:  normal conjunctivae, sclera., normal sclera and normal pupils  Neuro:  sensory grossly intact and motor grossly intact, normal monofilament exam  Psychiatric:  oriented to time, self, and place  Nutritional:  no abnormal weight gain or loss    Lab Data:   Lab Results   Component Value Date     (H) 12/31/2023    A1C 8.5 (A) 04/08/2024     Lab Results   Component Value Date     (H) 01/08/2024    BUN 31 (H) 01/08/2024    BUNCREA 27.7 (H) 01/08/2024    CREATSERUM 1.12 01/08/2024    ANIONGAP 6 01/08/2024    GFRNAA 50 (L) 07/28/2020    GFRAA 58 (L) 07/28/2020    CA 9.5 01/08/2024    OSMOCALC 300 (H) 01/08/2024    ALKPHO 46 04/18/2023    AST 30 04/18/2023    ALT 53 04/18/2023    BILT 0.2 04/18/2023    TP 6.4 04/18/2023    ALB 3.4 04/18/2023    GLOBULIN 3.0 04/18/2023     01/08/2024    K 4.4 01/08/2024     01/08/2024    CO2 29.0 01/08/2024     Lab Results   Component Value Date    CHOLEST 156 02/22/2020    TRIG 64 02/22/2020    HDL 45 02/22/2020    LDL 98 02/22/2020    VLDL 13 02/22/2020    NONHDLC 111 02/22/2020    CALCNONHDL 100 04/19/2015     No results found for: \"MALBP\", \"CREUR\", \"CREAURINE\", \"MIALBURINE\",  \"MCRRATIOUR\", \"MALBCRECALC\", \"MICROALBUMIN\", \"CREAUR\", \"MALBCREACALC\"      ASSESSMENT/PLAN:  This is a 69 year-old man here for evaluation and management of uncontrolled type 2 diabetes. We discussed the ABCs of DM.     1.) Hyperglycemia Management- We discussed the importance of glycemic control to prevent complications of diabetes. We discussed the importance of SBGM. I offered and provided patient education materials and offered a blood glucose log book.   - Continue Lantus 50 units qAM and hold humalog; Continue Jardiance as well as Metformin 500mg PO qAM  - Start Trulicity 0.75mg qweekly  - Continue checking blood sugars 4-5 times a day with Jessica    2.) Management of Diabetic Complications- We discussed the complications of diabetes include retinopathy, neuropathy, nephropathy and cardiovascular disease.   - Ophtho- up to date  - Flu and Covid vaccine- up to date  - BP- at goal, on meds  - Lipids- check before next visit  - MAC- check before next visit  - CMP- check before next visit  - Neuropathy- none  - CAD-none    3.) Lifestyle Management for Diabetes- We discussed importance of a low CHO diet, and recommend 45gm per meal or 135gm per day. We discussed the importance of trying to follow a Mediterranean diet, with an emphasis on vegetables at every meal, with lots whole grains, and protein from either plant-based sources, or poultry and fish.   - Diet- he will continue to eat healthy  - Exercise- he will exercise even more    Return to clinic in 6 weeks    Prior to this encounter, I spent over 20 minutes with preparing for the visit, reviewing documents from other specialties as well as from PCP, and going over test results and imaging studies. During the face to face encounter, I spent an additional 30 minutes which were determined for history-taking, physical examination, and orientation regarding our services. Greater than 50% of the time was spent in counseling, anticipatory guidance, and coordination  of care. Patient concerns were answered to the best of my knowledge.      4/08/2024  Mari Muse MD

## 2024-04-10 NOTE — TELEPHONE ENCOUNTER
Called pharmacy to inquire on pricing for Trulicity. Pharm tech states that there is a high deductible because pt has commercial insurance through wife and does not allow for the remainder to go through to Medicaid. Pharm tech also stated that Trulicity is not preferred brand and could be a reason as to why the amount is high.     Pharm tech was able to confirm that pt does not have commercial insurance and Trulicity will be covered with no copay. Trulicity dose is in stock and will be ready for  today.    Pt notified that med can be  today. Pt verbalized understanding.

## 2024-04-17 ENCOUNTER — PATIENT MESSAGE (OUTPATIENT)
Dept: ENDOCRINOLOGY CLINIC | Facility: CLINIC | Age: 69
End: 2024-04-17

## 2024-04-17 ENCOUNTER — HOSPITAL ENCOUNTER (EMERGENCY)
Facility: HOSPITAL | Age: 69
Discharge: HOME | End: 2024-04-17
Attending: EMERGENCY MEDICINE
Payer: MEDICAID

## 2024-04-17 VITALS
RESPIRATION RATE: 11 BRPM | HEART RATE: 58 BPM | WEIGHT: 212 LBS | OXYGEN SATURATION: 92 % | BODY MASS INDEX: 29.68 KG/M2 | DIASTOLIC BLOOD PRESSURE: 76 MMHG | HEIGHT: 71 IN | TEMPERATURE: 98 F | SYSTOLIC BLOOD PRESSURE: 133 MMHG

## 2024-04-17 DIAGNOSIS — R51.9 FRONTAL HEADACHE: Primary | ICD-10-CM

## 2024-04-17 LAB
ANION GAP SERPL CALC-SCNC: 6 MMOL/L (ref 0–18)
BASOPHILS # BLD AUTO: 0.06 X10(3) UL (ref 0–0.2)
BASOPHILS NFR BLD AUTO: 0.7 %
BUN BLD-MCNC: 29 MG/DL (ref 9–23)
BUN/CREAT SERPL: 23.2 (ref 10–20)
CALCIUM BLD-MCNC: 9.3 MG/DL (ref 8.7–10.4)
CHLORIDE SERPL-SCNC: 110 MMOL/L (ref 98–112)
CO2 SERPL-SCNC: 27 MMOL/L (ref 21–32)
CREAT BLD-MCNC: 1.25 MG/DL
DEPRECATED RDW RBC AUTO: 39.6 FL (ref 35.1–46.3)
EGFRCR SERPLBLD CKD-EPI 2021: 63 ML/MIN/1.73M2 (ref 60–?)
EOSINOPHIL # BLD AUTO: 0.29 X10(3) UL (ref 0–0.7)
EOSINOPHIL NFR BLD AUTO: 3.6 %
ERYTHROCYTE [DISTWIDTH] IN BLOOD BY AUTOMATED COUNT: 13.2 % (ref 11–15)
GLUCOSE BLD-MCNC: 159 MG/DL (ref 70–99)
GLUCOSE BLDC GLUCOMTR-MCNC: 210 MG/DL (ref 70–99)
HCT VFR BLD AUTO: 35.9 %
HGB BLD-MCNC: 11.7 G/DL
IMM GRANULOCYTES # BLD AUTO: 0.02 X10(3) UL (ref 0–1)
IMM GRANULOCYTES NFR BLD: 0.2 %
LYMPHOCYTES # BLD AUTO: 1.81 X10(3) UL (ref 1–4)
LYMPHOCYTES NFR BLD AUTO: 22.2 %
MCH RBC QN AUTO: 26.7 PG (ref 26–34)
MCHC RBC AUTO-ENTMCNC: 32.6 G/DL (ref 31–37)
MCV RBC AUTO: 82 FL
MONOCYTES # BLD AUTO: 0.79 X10(3) UL (ref 0.1–1)
MONOCYTES NFR BLD AUTO: 9.7 %
NEUTROPHILS # BLD AUTO: 5.17 X10 (3) UL (ref 1.5–7.7)
NEUTROPHILS # BLD AUTO: 5.17 X10(3) UL (ref 1.5–7.7)
NEUTROPHILS NFR BLD AUTO: 63.6 %
OSMOLALITY SERPL CALC.SUM OF ELEC: 305 MOSM/KG (ref 275–295)
PLATELET # BLD AUTO: 236 10(3)UL (ref 150–450)
POTASSIUM SERPL-SCNC: 4.3 MMOL/L (ref 3.5–5.1)
RBC # BLD AUTO: 4.38 X10(6)UL
SODIUM SERPL-SCNC: 143 MMOL/L (ref 136–145)
WBC # BLD AUTO: 8.1 X10(3) UL (ref 4–11)

## 2024-04-17 PROCEDURE — 85025 COMPLETE CBC W/AUTO DIFF WBC: CPT | Performed by: EMERGENCY MEDICINE

## 2024-04-17 PROCEDURE — 82962 GLUCOSE BLOOD TEST: CPT

## 2024-04-17 PROCEDURE — 99284 EMERGENCY DEPT VISIT MOD MDM: CPT

## 2024-04-17 PROCEDURE — 80048 BASIC METABOLIC PNL TOTAL CA: CPT | Performed by: EMERGENCY MEDICINE

## 2024-04-17 PROCEDURE — 96361 HYDRATE IV INFUSION ADD-ON: CPT

## 2024-04-17 PROCEDURE — 96375 TX/PRO/DX INJ NEW DRUG ADDON: CPT

## 2024-04-17 PROCEDURE — 96374 THER/PROPH/DIAG INJ IV PUSH: CPT

## 2024-04-17 RX ORDER — DIPHENHYDRAMINE HYDROCHLORIDE 50 MG/ML
25 INJECTION INTRAMUSCULAR; INTRAVENOUS ONCE
Status: COMPLETED | OUTPATIENT
Start: 2024-04-17 | End: 2024-04-17

## 2024-04-17 RX ORDER — METOCLOPRAMIDE HYDROCHLORIDE 5 MG/ML
10 INJECTION INTRAMUSCULAR; INTRAVENOUS ONCE
Status: COMPLETED | OUTPATIENT
Start: 2024-04-17 | End: 2024-04-17

## 2024-04-17 RX ORDER — KETOROLAC TROMETHAMINE 15 MG/ML
15 INJECTION, SOLUTION INTRAMUSCULAR; INTRAVENOUS ONCE
Status: COMPLETED | OUTPATIENT
Start: 2024-04-17 | End: 2024-04-17

## 2024-04-18 ENCOUNTER — TELEPHONE (OUTPATIENT)
Dept: ENDOCRINOLOGY CLINIC | Facility: CLINIC | Age: 69
End: 2024-04-18

## 2024-04-18 NOTE — TELEPHONE ENCOUNTER
Spoke to patient for follow up. States he is feeling much better. Has a small slight headache rating a 3/10 at this time. BP checked while on the phone and it was 138/71 at 10:42am. Patient had no further concerns at this time. Patient was advised to follow up with PCP regarding shayy blood pressure as well as headache. Patient verbalized understanding.

## 2024-04-18 NOTE — ED PROVIDER NOTES
Patient Seen in: [facility] Emergency Department      History     Chief Complaint   Patient presents with    Hypertension    Hyperglycemia     Stated Complaint: hypertension, hyperglycemia    Subjective:   HPI    69-year-old male with history of hypertension, diabetes, hyperlipidemia, thyroid disorder, and coronary artery disease presents with complaints of elevated blood pressure, hyperglycemia, and headache.  The patient states that he is on Trulicity late last week.  He reports headaches over the past couple of days along with blood pressure readings as high as 210 systolic.  He also reports that his blood sugars have been, \"out of control.\"  He reports bifrontal headache with radiation to the back.  He took some Tylenol with relief of headache last night but the headache returned today.  He called his endocrinologist today who prescribed Trulicity asking if this was a result of the medication.  He was advised to come to the ED for evaluation.  He denies any vision or speech changes.  He denies focal weakness or numbness.  No fever.  No vomiting.  History is obtained from both the patient and his wife at the bedside.    Objective:   Past Medical History:    Atherosclerosis of coronary artery    Deaf, right    Diabetes (HCC)    Essential hypertension    Hyperlipidemia    Thyroid disease              Past Surgical History:   Procedure Laterality Date    Coronary stent placement      Rotator cuff repair Left                 Social History     Socioeconomic History    Marital status:    Tobacco Use    Smoking status: Former     Passive exposure: Never    Smokeless tobacco: Never   Vaping Use    Vaping status: Never Used   Substance and Sexual Activity    Alcohol use: Not Currently    Drug use: Never     Social Determinants of Health     Financial Resource Strain: Low Risk  (1/9/2024)    Financial Resource Strain     Difficulty of Paying Living Expenses: Not hard at all     Med Affordability: No   Food  Insecurity: No Food Insecurity (12/30/2023)    Food Insecurity     Food Insecurity: Never true   Transportation Needs: No Transportation Needs (1/9/2024)    Transportation Needs     Lack of Transportation: No   Housing Stability: Low Risk  (12/30/2023)    Housing Stability     Housing Instability: No              Review of Systems    Positive for stated complaint: hypertension, hyperglycemia  Other systems are as noted in HPI.  Constitutional and vital signs reviewed.      All other systems reviewed and negative except as noted above.    Physical Exam     ED Triage Vitals [04/17/24 1937]   /75   Pulse 68   Resp 18   Temp 98 °F (36.7 °C)   Temp src Oral   SpO2 97 %   O2 Device None (Room air)       Current:/69   Pulse 58   Temp 98 °F (36.7 °C) (Oral)   Resp 13   Ht 180.3 cm (5' 11\")   Wt 96.2 kg   SpO2 96%   BMI 29.57 kg/m²         Physical Exam      General Appearance: alert, no distress  Eyes: pupils equal and round no pallor or injection  ENT, Mouth: mucous membranes moist.  Oropharynx normal-appearing.  Respiratory: there are no retractions, lungs are clear to auscultation  Cardiovascular: regular rate and rhythm  Gastrointestinal:  abdomen is soft and non tender, no masses, bowel sounds normal  Neurological: Speech normal.  Cranial nerves II through XII intact.  No focal motor or sensory deficits to extremities x 4.  Skin: warm and dry, no rashes.  Musculoskeletal: neck is supple non tender        Extremities are symmetrical, full range of motion  Psychiatric: patient is oriented X 3, there is no agitation    DIFFERENTIAL DIAGNOSIS: After history and physical exam differential diagnosis was considered for headache including subarachnoid hemorrhage migraine headache, meningitis, infectious causes such as pharyngitis, tension headache and sinusitis        ED Course     Labs Reviewed   BASIC METABOLIC PANEL (8) - Abnormal; Notable for the following components:       Result Value    Glucose 159  (*)     BUN 29 (*)     BUN/CREA Ratio 23.2 (*)     Calculated Osmolality 305 (*)     All other components within normal limits   POCT GLUCOSE - Abnormal; Notable for the following components:    POC Glucose  210 (*)     All other components within normal limits   CBC W/ DIFFERENTIAL - Abnormal; Notable for the following components:    HGB 11.7 (*)     HCT 35.9 (*)     All other components within normal limits   CBC WITH DIFFERENTIAL WITH PLATELET    Narrative:     The following orders were created for panel order CBC With Differential With Platelet.  Procedure                               Abnormality         Status                     ---------                               -----------         ------                     CBC W/ DIFFERENTIAL[[id number]]          Abnormal            Final result                 Please view results for these tests on the individual orders.                    MDM      Patient reports resolution of headache post medications.  Patient's blood pressure has been stable throughout ED stay.  Lab results noted.  No significant hyperglycemia.  Will discharge the patient home with recommendations to follow-up with his primary physician.  He is advised to return if severe headache or new neurologic symptoms develop.                                   Medical Decision Making      Disposition and Plan     Clinical Impression:  1. Frontal headache         Disposition:  Discharge  4/17/2024  9:59 pm    Follow-up:  Kaleb Marie MD  [address] [place] [id number]  [phone]    Follow up      We recommend that you schedule follow up care with a primary care provider within the next three months to obtain basic health screening including reassessment of your blood pressure.      Medications Prescribed:  Current Discharge Medication List

## 2024-04-18 NOTE — DISCHARGE INSTRUCTIONS
Take Tylenol as needed if headache recurs.  Continue medications as previously prescribed.  Follow-up with your primary physician as discussed.  Return to the emergency department if severe headache, vision changes, or other new symptoms develop.

## 2024-04-18 NOTE — ED INITIAL ASSESSMENT (HPI)
C/o HTN x3 stating BP max at home was 220/94 and HA. Patient states BP has been more elevated than normal. + Nausea, no urinary symptoms. Patient states he just started trulicity on Sunday.

## 2024-04-18 NOTE — TELEPHONE ENCOUNTER
Patient's wife paged about high BP readings at home  SBP in the 170-180s for the last few days  He also has HA  He recently started trulicity     After a discussion, she decided to take him to the ER for evaluation of BP     ENDO STAFF: please FU     Dr. Muse: SAVI Sims

## 2024-04-18 NOTE — TELEPHONE ENCOUNTER
From: Samy Hoskins  To: Mari Muse  Sent: 4/17/2024 6:11 PM CDT  Subject: Severe headaches &bp is high    Is it normal to have severe headaches I back of my head & my temples & my bp has been high since yesterday went down last night but continues to be high is this normal

## 2024-04-19 ENCOUNTER — OFFICE VISIT (OUTPATIENT)
Dept: INTERNAL MEDICINE CLINIC | Facility: CLINIC | Age: 69
End: 2024-04-19

## 2024-04-19 VITALS
BODY MASS INDEX: 29.68 KG/M2 | DIASTOLIC BLOOD PRESSURE: 60 MMHG | HEIGHT: 71 IN | TEMPERATURE: 98 F | SYSTOLIC BLOOD PRESSURE: 102 MMHG | WEIGHT: 212 LBS | OXYGEN SATURATION: 96 % | RESPIRATION RATE: 17 BRPM | HEART RATE: 68 BPM

## 2024-04-19 DIAGNOSIS — I25.10 CORONARY ARTERY DISEASE INVOLVING NATIVE CORONARY ARTERY OF NATIVE HEART WITHOUT ANGINA PECTORIS: ICD-10-CM

## 2024-04-19 DIAGNOSIS — Z79.4 TYPE 2 DIABETES MELLITUS WITHOUT COMPLICATION, WITH LONG-TERM CURRENT USE OF INSULIN (HCC): ICD-10-CM

## 2024-04-19 DIAGNOSIS — I10 PRIMARY HYPERTENSION: ICD-10-CM

## 2024-04-19 DIAGNOSIS — E11.9 TYPE 2 DIABETES MELLITUS WITHOUT COMPLICATION, WITH LONG-TERM CURRENT USE OF INSULIN (HCC): ICD-10-CM

## 2024-04-19 DIAGNOSIS — Z09 ENCOUNTER FOR EXAMINATION FOLLOWING TREATMENT AT HOSPITAL: Primary | ICD-10-CM

## 2024-04-19 PROCEDURE — 99214 OFFICE O/P EST MOD 30 MIN: CPT | Performed by: INTERNAL MEDICINE

## 2024-04-25 ENCOUNTER — HOSPITAL ENCOUNTER (OUTPATIENT)
Dept: ENDOCRINOLOGY | Facility: HOSPITAL | Age: 69
Discharge: HOME | End: 2024-04-25
Attending: INTERNAL MEDICINE
Payer: MEDICAID

## 2024-04-25 DIAGNOSIS — E11.9 TYPE 2 DIABETES MELLITUS WITHOUT COMPLICATION, WITH LONG-TERM CURRENT USE OF INSULIN (HCC): Primary | ICD-10-CM

## 2024-04-25 DIAGNOSIS — Z79.4 TYPE 2 DIABETES MELLITUS WITHOUT COMPLICATION, WITH LONG-TERM CURRENT USE OF INSULIN (HCC): Primary | ICD-10-CM

## 2024-04-25 NOTE — PROGRESS NOTES
Samy Hoskins 4/4/1955 attended for Intensive Individual Management:    Date: 4/25/2024 Start Time: 1030 End Time: 1130    HEMOGLOBIN A1C (%)   Date Value   04/08/2024 8.5 (A)     Wt Readings from Last 1 Encounters:   04/19/24 212 lb       Assessment:  Samy provided food and blood sugar records for today's appointment. He explained he has experienced increased headaches, increased blood pressure and has noticed higher glucose reading in the morning.  He stated he stopped his long acting insulin 2 weeks ago and started taking Trulicity due to MD recommendations.  He continues to administer Humalog before the meals.    We reviewed his Jessica reports together and noted blood sugar elevation from 12 MN to 5 AM. TIR 32%; Above range 68%; Below range 0%.  Review of recent MD office note indicates he was instructed to continue with Lantus and stop Humalog in addition to Trulicity.  Contact Dr Muse during the visit and confirmed patient had been instructed to continue long acting insulin and stop rapid acting insulin.  Reviewed MD recommendations with him and he verbalized understanding.  He provided food records for today's appointment and he is consuming 3 smaller meals with occasionally small snack.   Discussed Trulicity medication and side effects that can occur and strategies to decrease side effects.     Education:       Being Active  Currently exercising: no-however he is physically active during the day.  Reinforced the benefits of exercise and safety precautions.  Encouraged Physical Activity and briefly reviewed ADA recommended guidelines for activity.  Instructed on food and/or insulin adjustments for exercise.    Monitoring  Benefits and options for glucose monitoring, target BG goals, HgbA1C values.  Reinforced glucose monitoring schedules.  Currently testing/monitoring CGM: fasting, before meals, after meals, and bedtime    Time in Range  68% Time above Range  0 %Time below Range  32 %Time in  Range      Taking Medication  Discussed actions of Trulicity medication.      Reducing Risk  Instructed on food and/or insulin adjustments for exercise.      Problem Solving:  Discussed signs, symptoms and treatment of hypoglycemia and hyperglycemia.  Reviewed management of hypoglycemia  Taught Rule of 15  Discussed calling provider and/or diabetes learning center with blood sugars less than 70 twice in one week or blood sugars consistently elevated above 250.     Health Coping  Family involvement/support encouraged  Depression and diabetes reviewed.    Diabetes Support Management Plan provided and reviewed with patient.   Samy Hoskins has chosen the following ongoing Diabetes Support Plan: Exercise programs    Patient Goals/Recommendations: Patient chosen goals included in patient's instructions for today.    Pt verbalized understanding and has no further questions.    Follow up appointment set for: ANTONY Merritt RN

## 2024-04-25 NOTE — PATIENT INSTRUCTIONS
Goals                      Today    3/7/2024    1.  EDC - Medications (pt-stated)           Note created  4/25/2024 12:00 PM by Nathaly Merritt, RN       I will take long acting insulin as instructed every day.      2.  EDC - Problem Solving (pt-stated)   Doing What I Said  Doing What I Said      Note created  2/26/2024  8:56 AM by Nathaly Merritt, RN      I will carry treatment for low blood sugar with me at all times            Remember- Take Lantus insulin every day           Take Trulicity every week as directed           STOP taking Humalog insulin before meals.    Contact the diabetes learning Center as needed for next appointment.

## 2024-04-25 NOTE — PROGRESS NOTES
Subjective:   Patient ID: Smay Hoskins is a 69 year old male.    HPI  Patient comes follow-up of that he was seen in ER with a headache elevated BP patient said he has not taken his blood pressure medication because he was not ready at home the blood pressure has been high patient also recently started Trulicity has  some nausea symptoms feeling better now   History/Other:   Review of Systems   Constitutional: Negative.    HENT: Negative.     Eyes: Negative.    Respiratory: Negative.     Cardiovascular: Negative.    Gastrointestinal: Negative.    Genitourinary: Negative.    Musculoskeletal: Negative.    Skin: Negative.    Neurological: Negative.    Psychiatric/Behavioral: Negative.       Current Outpatient Medications   Medication Sig Dispense Refill    Dulaglutide (TRULICITY) 0.75 MG/0.5ML Subcutaneous Solution Pen-injector Inject 0.75 mg into the skin once a week. 2 mL 2    Continuous Blood Gluc Sensor (CategoricalSTYLE AJAY 3 SENSOR) Does not apply Misc 1 each every 14 (fourteen) days. (Patient not taking: Reported on 4/8/2024) 6 each 1    Empagliflozin (JARDIANCE) 25 MG Oral Tab Take 25 mg by mouth daily.      metFORMIN HCl 1000 MG Oral Tab Take 1 tablet (1,000 mg total) by mouth 2 (two) times daily with meals. (Patient taking differently: Take 0.5 tablets (500 mg total) by mouth daily with breakfast.) 180 tablet 3    levothyroxine 125 MCG Oral Tab Take 1 tablet (125 mcg total) by mouth before breakfast. 90 tablet 3    rosuvastatin 20 MG Oral Tab Take 1 tablet (20 mg total) by mouth nightly. 90 tablet 3    aspirin 81 MG Oral Tab EC Take 1 tablet (81 mg total) by mouth daily. 90 tablet 3    isosorbide mononitrate ER 30 MG Oral Tablet 24 Hr Take 1 tablet (30 mg total) by mouth daily. 90 tablet 3    ezetimibe 10 MG Oral Tab Take 1 tablet (10 mg total) by mouth nightly. 90 tablet 3    amLODIPine 10 MG Oral Tab Take 1 tablet (10 mg total) by mouth daily. 90 tablet 3    losartan 25 MG Oral Tab Take 1 tablet (25 mg total) by  mouth daily. 90 tablet 3    insulin lispro (HUMALOG) 100 UNIT/ML Subcutaneous Solution Inject 20 Units into the skin in the morning, at noon, and at bedtime. (Patient taking differently: Inject 20 Units into the skin in the morning, at noon, and at bedtime. 10-20 units of insulin based on size of meal) 60 mL 2     Allergies:  Allergies   Allergen Reactions    Shrimp OTHER (SEE COMMENTS)     Throat tightness       Objective:   Physical Exam  Vitals and nursing note reviewed.   Constitutional:       Appearance: He is well-developed.   HENT:      Head: Normocephalic and atraumatic.      Right Ear: External ear normal.      Left Ear: External ear normal.      Nose: Nose normal.   Eyes:      Conjunctiva/sclera: Conjunctivae normal.      Pupils: Pupils are equal, round, and reactive to light.   Cardiovascular:      Rate and Rhythm: Normal rate and regular rhythm.      Heart sounds: Normal heart sounds.   Pulmonary:      Effort: Pulmonary effort is normal.      Breath sounds: Normal breath sounds.   Abdominal:      General: Bowel sounds are normal.      Palpations: Abdomen is soft.   Genitourinary:     Penis: Normal.       Prostate: Normal.      Rectum: Normal.   Musculoskeletal:         General: Normal range of motion.      Cervical back: Normal range of motion and neck supple.   Skin:     General: Skin is warm and dry.   Neurological:      Mental Status: He is alert and oriented to person, place, and time.      Deep Tendon Reflexes: Reflexes are normal and symmetric.         Assessment & Plan:   1. Encounter for examination following treatment at hospital patient was elevated no okay taking medication   2. Type 2 diabetes mellitus without complication, with long-term current use of insulin (HCC) taking Trulicity watching diet   3. Primary hypertension blood pressure improved continue current treatment   4. Coronary artery disease involving native coronary artery of native heart without angina pectoris    Follow-up with  cardiology referral placed    No orders of the defined types were placed in this encounter.      Meds This Visit:  Requested Prescriptions      No prescriptions requested or ordered in this encounter       Imaging & Referrals:  CARDIO - INTERNAL

## 2024-05-16 ENCOUNTER — TELEPHONE (OUTPATIENT)
Dept: ENDOCRINOLOGY CLINIC | Facility: CLINIC | Age: 69
End: 2024-05-16

## 2024-05-16 DIAGNOSIS — Z79.4 TYPE 2 DIABETES MELLITUS WITH HYPERGLYCEMIA, WITH LONG-TERM CURRENT USE OF INSULIN (HCC): Primary | ICD-10-CM

## 2024-05-16 DIAGNOSIS — E11.65 TYPE 2 DIABETES MELLITUS WITH HYPERGLYCEMIA, WITH LONG-TERM CURRENT USE OF INSULIN (HCC): Primary | ICD-10-CM

## 2024-05-16 NOTE — TELEPHONE ENCOUNTER
Wife states patient is not eating well, has headaches, and blood sugar spikes. Patient thinks it is due to trulicity please call

## 2024-05-17 RX ORDER — DULAGLUTIDE 1.5 MG/.5ML
1.5 INJECTION, SOLUTION SUBCUTANEOUS WEEKLY
Qty: 6 ML | Refills: 0 | Status: SHIPPED | OUTPATIENT
Start: 2024-05-17 | End: 2024-08-15

## 2024-05-17 NOTE — TELEPHONE ENCOUNTER
I called the patient's wife back.   Informed her of Dr. Barnes's instructions below.   Reminded of upcoming appointment with Dr. Muse on 5/21/24. Provided appt date, time, location, parking instructions.   Advised high protein low carb diet for the weekend and to stay well hydrated.   Advised Dr. Marie's team has been notified of ongoing headaches and elevated blood pressures so she should expect a call from his nurse today to discuss.   Advised to call our office over the weekend if blood sugars are persistently > 400 and on call physician will call her back.   Go to ER if he develops severe symptoms as previously discussed and documented below.   She was Ok with this plan and had no further questions.   Will continue current medications as prescribed.

## 2024-05-17 NOTE — TELEPHONE ENCOUNTER
How high is the BP?, if continues to be high he can double up on the losartan to 50 mg daily, let me know

## 2024-05-17 NOTE — TELEPHONE ENCOUNTER
Patient spouse contacted. Nurys on LONNY and notified of provider's recommendation.  Patient spouse verbalized understanding. Patient in the room with spouse. Continue to monitor blood pressure.     Patient has cardiologist Monday.  Discussed red flags. Patient/spouse verbally understood to go to ER if has any significant changes, or worsening symptoms.

## 2024-05-17 NOTE — TELEPHONE ENCOUNTER
Per KARL Narayanan (as seen in previous charting note), patient's blood pressure during phone call with her was 156/86 with pulse of 78.

## 2024-05-17 NOTE — TELEPHONE ENCOUNTER
Verified name and  of patient.    KARL Narayanan (endocrinology) calling to follow up on her previous charting note as seen below.     She is asking that message be forwarded to Dr. Marie for any further recommendations prior to patient's upcoming appointment with cardiologist on 24.    She states patients denies any chest pain at this time- has had to take prescribed nitroglycerin over the the past week for chest pain.    She reports that patient continues to have headaches- ongoing for over a month- headaches controllable at this time.     Per KARL Narayanan (as seen in previous charting note), patient's blood pressure during phone call with her was 156/86 with pulse of 78.    Dr. Marie, please seen message below for more detailed information. Routing for any recommendations or possible adjustments to blood pressure medication before patient sees cardiologist on Monday.

## 2024-05-17 NOTE — TELEPHONE ENCOUNTER
CGM data showed BG is  high   Pt needs a visit to discuss and change meds   Pt has an appt with Dr Muse  5/21   Thank    . 14 day CGM data showed   GMI 8.6 %  TIR 37  %  high 25%  low %  very high 38 %  Very low   %

## 2024-05-17 NOTE — TELEPHONE ENCOUNTER
To on call endocrinologist Dr. Barnes on behalf of Dr. Muse: Please see details below. CGM report placed in your folder for review. Blood sugars variable and elevated. Reporting symptoms of headache, HTN, blurred vision. Unsure if he should continue trulicity.     To Dr. Marie's team: Please see details below. Patient continues to have elevated blood pressures and headaches as discussed at LOV with Dr. Marie. Patient instructed when to visit ER. Has cardio appt on Monday. Any advise for over the weekend?     History: Patient was prescribed trulicity on 4/8/24. Patient paged on call provider last month due to elevated systolic BP and headaches. He went to the ER for evaluation at that time. Patient does have a history of hypertension and coronary artery disease. Patient was discharged from ER on 4/17/24 with no med changes and instructions to follow up with PCP regarding elevated blood pressures. Visit with PCP Dr. Marie on 4/19/24. Was advised to continue current BP treatment and was referred to cardiology. Per wife he has an appointment with cardiology scheduled this coming Monday. Sodium and postassium levels normal upon last check on 4/17/24.     Current status: Patient is sitting in chair at home relaxing. Blood sugar on phone 200. Blood pressure 156/86, pulse 78. He currently has a headache behind the eyes and frontal forehead rated 5/10 in severity. Currently no chest pain or shortness of breath. Has blurry vision but no double vision or loss of vision. Could be related to highly variable blood glucose levels. He currently has mild to moderate dizziness. He is able to get up and move around his house but is avoiding going out at this time for fear of dizziness worsening. No abnormal gait. No weakness on one side of the body. No unsteady gait. No facial paralysis. For blood pressure taking:    Isosorbide mononitrate Er 30 mg daily   Amlodipine 10 mg daily  Losartan 25 mg daily    Last Friday at his  daughter's house he had chest pain but did not tell anyone. Pain increased when he came back home so he took a dose of nitroglycerin which resolved the symptoms. He has a history of several stent placements in heart which is why he has the prescription. No CP currently. 2 nights ago he developed severe lokesh horses in legs that cause significant pain, but did resolve. Had blood sugar in 60's at that time which he corrected.     RN advise: Continue to monitor blood glucose with CGM and blood pressures every hour. Will consult with on call endocrinologist and PCP for next steps. Return to ER if he develops any of the following: Chest pain, shortness of breath, increasing headache pain, severe visual changes (double vision, spotting, loss of vision), s/s of stoke as discussed, vomiting, SBP over 200, blood glucose consistently over 400. Severe muscle spasms/pain.     He has a shahbaz CGM. Downloaded. Reviewed report. Blood sugars have been very elevated overall. 300-400.     Hyperglycemia     Onset of hyperglycemia: Ongoing for the last several weeks. At times will also have intermittent low sugars in the 60's. Most frequently 2 nights ago at 12 AM. At that time also was having lokesh horses in legs.     BG levels (please print out CGM and/or pump report if patient is wearing one): shahbaz report printed and placed in your folder.     Symptoms (RN only: N/V, abdominal pain and/or radiating to the back, blurry vision, increased urinary frequency, blurred vision, CP, SOB): He has been having intermittent nausea over the past several weeks. Not eating much. No nausea currently. No vomiting. Has blurry vision.     Pattern of hyperglycemia: Sugar was 150 fasting this morning at 7 am. He only had cinnamon tea and sugar currenlty on phone 200 with no food.     Steroid therapy: none    Acute Illness: He has head ongoing intermittent headaches. Headache currently 5/10.     Change in Diet: appetite decreased.     List DM  Medications/Compliance:     Lantus 50 units every morning - confirmed taking  Jardiance 25 mg daily - confirmed taking  Metformin 500 mg every morning - confirmed taking  Trulicity 0.75 mg weekly - confirmed taking, last dose 5/11/24  Humalog - continues to hold per Dr. Muse

## 2024-05-18 RX ORDER — INSULIN GLARGINE 100 [IU]/ML
42 INJECTION, SOLUTION SUBCUTANEOUS EVERY MORNING
COMMUNITY
Start: 2024-05-18

## 2024-05-18 NOTE — TELEPHONE ENCOUNTER
Hi!  Please let patient know that I am going to increase the dose of Trulicity to 1.5mg qweekly and also decrease the lantus from 50 units to 42 units. Thank you!

## 2024-05-18 NOTE — TELEPHONE ENCOUNTER
Left message on Spouse's phone number regarding below recommendation but to also check Patient Education Systemshart message.   To call back for any questions or respond to [employer]ander[employer].

## 2024-05-20 NOTE — TELEPHONE ENCOUNTER
Called pt spouse per LONNY.   Provided update below. Per pt wife, picked up Trulicty 1.5 mg and injected 5/19. BG are still elevated and states that low dose is not helping. Explained to wife that slow titration is necessary with these medications as increasing too quickly can lead to severe SE and can take a few weeks to see drop in BG levels. Pt wife verbalized understanding. Pt wife notified that Lantus dose was decreased to 42 units.

## 2024-05-21 ENCOUNTER — OFFICE VISIT (OUTPATIENT)
Dept: ENDOCRINOLOGY CLINIC | Facility: CLINIC | Age: 69
End: 2024-05-21

## 2024-05-21 VITALS
DIASTOLIC BLOOD PRESSURE: 61 MMHG | SYSTOLIC BLOOD PRESSURE: 90 MMHG | BODY MASS INDEX: 28.61 KG/M2 | HEART RATE: 81 BPM | HEIGHT: 71 IN | WEIGHT: 204.38 LBS

## 2024-05-21 DIAGNOSIS — Z79.4 TYPE 2 DIABETES MELLITUS WITH HYPERGLYCEMIA, WITH LONG-TERM CURRENT USE OF INSULIN (HCC): Primary | ICD-10-CM

## 2024-05-21 DIAGNOSIS — I10 PRIMARY HYPERTENSION: ICD-10-CM

## 2024-05-21 DIAGNOSIS — E78.2 MIXED HYPERLIPIDEMIA: ICD-10-CM

## 2024-05-21 DIAGNOSIS — E11.65 TYPE 2 DIABETES MELLITUS WITH HYPERGLYCEMIA, WITH LONG-TERM CURRENT USE OF INSULIN (HCC): Primary | ICD-10-CM

## 2024-05-21 LAB
GLUCOSE BLOOD: 317
HEMOGLOBIN A1C: 8.7 % (ref 4.3–5.6)
TEST STRIP LOT #: NORMAL NUMERIC

## 2024-05-21 PROCEDURE — 82947 ASSAY GLUCOSE BLOOD QUANT: CPT | Performed by: INTERNAL MEDICINE

## 2024-05-21 PROCEDURE — 83036 HEMOGLOBIN GLYCOSYLATED A1C: CPT | Performed by: INTERNAL MEDICINE

## 2024-05-21 PROCEDURE — 99214 OFFICE O/P EST MOD 30 MIN: CPT | Performed by: INTERNAL MEDICINE

## 2024-05-21 RX ORDER — GLIPIZIDE 10 MG/1
10 TABLET ORAL
Qty: 90 TABLET | Refills: 1 | Status: SHIPPED | OUTPATIENT
Start: 2024-05-21 | End: 2024-08-19

## 2024-05-21 NOTE — TELEPHONE ENCOUNTER
Patient's spouse on LONNY would like a refill on Rx jardiance 25MG. Please advise       Verified pharmacy [facility]         Current Outpatient Medications   Medication Sig Dispense Refill    Empagliflozin (JARDIANCE) 25 MG Oral Tab Take 25 mg by mouth daily.

## 2024-05-21 NOTE — PROGRESS NOTES
Name: Samy Hoskins  Date: 5/21/24    Referring Physician: No ref. provider found    HISTORY OF PRESENT ILLNESS   Samy Hoskins is a 69 year old male who presents for evaluation and management of type 2 diabetes. He was diagnosed with diabetes about 8 years ago. At the last visit, I had asked him to stop the humalog 20 units with meals, because I had thought that this was an inefficient way of treating his diabetes, and then started him on Trulicity. He comes today with elevated blood sugars as well as side effects such as no appetite. He is eating healthier.     He sent a message recently saying that his blood sugars were increasing and so I increased the Trulicity dose and then decreased the dose of Lantus from 50 to 42 units.     Blood Glucose Today: 318  HbA1C or glycohemoglobin  8.7 today (and it was 8.5 on 4/08/24)  Type 1 or Type 2?: Type 2  Medications for DM: Lantus 42 units qAM; Trulicity 1.5mg qweekly Jardiance 25mg PO qday; Metformin 500mg PO qday.   Checking 4-5 times per day with Jessica:  Very High- 39%  High- 32%  Target- 29%  Low- 0  Episodes of hypoglycemia: none    Dietary compliance: He is trying to eat more healthy    Exercise: he is walking more, about 45 minutes    Polyuria/polydipsia: none    Blurred vision: none    Flu Vaccine This Season: yes    Covid Vaccine: yes    REVIEW OF SYSTEMS  CV: Cardiovascular disease present: none         Hypertension present: at goal, on meds         Hyperlipidemia present: at goal (2021)         Peripheral Vascular Disease present: none    : Nephropathy present: MAC: unknown Creatinine: 1.25 (4/17/24)     Neuro: Neuropathy present: none    Eyes: Diabetic retinopathy present: retinopathy bilaterally            Most recent visit to eye doctor in last 12 months: yes    Skin: Infection or ulceration: none    Osteoporosis/ Osteopenia: none Vitamin D: none    Thyroid disease: none TSH: none    Medications:     Current Outpatient Medications:     insulin glargine  (LANTUS SOLOSTAR) 100 UNIT/ML Subcutaneous Solution Pen-injector, Inject 42 Units into the skin every morning., Disp: , Rfl:     Dulaglutide (TRULICITY) 1.5 MG/0.5ML Subcutaneous Solution Pen-injector, Inject 1.5 mg into the skin once a week., Disp: 6 mL, Rfl: 0    Empagliflozin (JARDIANCE) 25 MG Oral Tab, Take 25 mg by mouth daily., Disp: , Rfl:     metFORMIN HCl 1000 MG Oral Tab, Take 1 tablet (1,000 mg total) by mouth 2 (two) times daily with meals. (Patient taking differently: Take 0.5 tablets (500 mg total) by mouth daily with breakfast.), Disp: 180 tablet, Rfl: 3    Continuous Blood Gluc Sensor (eNovanceSTYLE AJAY 3 SENSOR) Does not apply Misc, 1 each every 14 (fourteen) days., Disp: 6 each, Rfl: 1    levothyroxine 125 MCG Oral Tab, Take 1 tablet (125 mcg total) by mouth before breakfast., Disp: 90 tablet, Rfl: 3    rosuvastatin 20 MG Oral Tab, Take 1 tablet (20 mg total) by mouth nightly., Disp: 90 tablet, Rfl: 3    aspirin 81 MG Oral Tab EC, Take 1 tablet (81 mg total) by mouth daily., Disp: 90 tablet, Rfl: 3    isosorbide mononitrate ER 30 MG Oral Tablet 24 Hr, Take 1 tablet (30 mg total) by mouth daily., Disp: 90 tablet, Rfl: 3    ezetimibe 10 MG Oral Tab, Take 1 tablet (10 mg total) by mouth nightly., Disp: 90 tablet, Rfl: 3    amLODIPine 10 MG Oral Tab, Take 1 tablet (10 mg total) by mouth daily., Disp: 90 tablet, Rfl: 3    losartan 25 MG Oral Tab, Take 1 tablet (25 mg total) by mouth daily., Disp: 90 tablet, Rfl: 3    insulin lispro (HUMALOG) 100 UNIT/ML Subcutaneous Solution, Inject 20 Units into the skin in the morning, at noon, and at bedtime. (Patient not taking: Reported on 5/21/2024), Disp: 60 mL, Rfl: 2     Allergies:   Allergies   Allergen Reactions    Shrimp OTHER (SEE COMMENTS)     Throat tightness       Social History:   Social History     Socioeconomic History    Marital status:    Tobacco Use    Smoking status: Former     Passive exposure: Never    Smokeless tobacco: Never   Vaping Use     Vaping status: Never Used   Substance and Sexual Activity    Alcohol use: Not Currently    Drug use: Never     Social Determinants of Health     Financial Resource Strain: Low Risk  (1/9/2024)    Financial Resource Strain     Difficulty of Paying Living Expenses: Not hard at all     Med Affordability: No   Food Insecurity: No Food Insecurity (12/30/2023)    Food Insecurity     Food Insecurity: Never true   Transportation Needs: No Transportation Needs (1/9/2024)    Transportation Needs     Lack of Transportation: No   Housing Stability: Low Risk  (12/30/2023)    Housing Stability     Housing Instability: No       Medical History:   Past Medical History:    Atherosclerosis of coronary artery    Deaf, right    Diabetes (HCC)    Essential hypertension    Hyperlipidemia    Thyroid disease       Surgical history:   Past Surgical History:   Procedure Laterality Date    Coronary stent placement      Rotator cuff repair Left          PHYSICAL EXAM  Vitals:    05/21/24 1353   BP: 90/61   Pulse: 81   Weight: 204 lb 6.4 oz (92.7 kg)   Height: 5' 11\" (1.803 m)       General Appearance:  alert, well developed, in no acute distress  Eyes:  normal conjunctivae, sclera., normal sclera and normal pupils  Neuro:  sensory grossly intact and motor grossly intact, normal monofilament exam  Psychiatric:  oriented to time, self, and place  Nutritional:  no abnormal weight gain or loss    Lab Data:   Lab Results   Component Value Date     (H) 12/31/2023    A1C 8.5 (A) 04/08/2024     Lab Results   Component Value Date     (H) 04/17/2024    BUN 29 (H) 04/17/2024    BUNCREA 23.2 (H) 04/17/2024    CREATSERUM 1.25 04/17/2024    ANIONGAP 6 04/17/2024    GFRNAA 50 (L) 07/28/2020    GFRAA 58 (L) 07/28/2020    CA 9.3 04/17/2024    OSMOCALC 305 (H) 04/17/2024    ALKPHO 46 04/18/2023    AST 30 04/18/2023    ALT 53 04/18/2023    BILT 0.2 04/18/2023    TP 6.4 04/18/2023    ALB 3.4 04/18/2023    GLOBULIN 3.0 04/18/2023      04/17/2024    K 4.3 04/17/2024     04/17/2024    CO2 27.0 04/17/2024     Lab Results   Component Value Date    CHOLEST 156 02/22/2020    TRIG 64 02/22/2020    HDL 45 02/22/2020    LDL 98 02/22/2020    VLDL 13 02/22/2020    NONHDLC 111 02/22/2020    CALCNONHDL 100 04/19/2015     No results found for: \"MALBP\", \"CREUR\", \"CREAURINE\", \"MIALBURINE\", \"MCRRATIOUR\", \"MALBCRECALC\", \"MICROALBUMIN\", \"CREAUR\", \"MALBCREACALC\"      ASSESSMENT/PLAN:  This is a 69 year-old man here for evaluation and management of uncontrolled type 2 diabetes. We discussed the ABCs of DM.     1.) Hyperglycemia Management- We discussed the importance of glycemic control to prevent complications of diabetes. We discussed the importance of SBGM. I offered and provided patient education materials and offered a blood glucose log book.   - Continue Lantus 42 units qAM and hold humalog; Continue Jardiance 25mg PO qday and stop Metformin.   - Start glipizide 10mg PO bid  - Continue Trulicity 1.5mg qweekly  - Continue checking blood sugars 4-5 times a day with Jessica    2.) Management of Diabetic Complications- We discussed the complications of diabetes include retinopathy, neuropathy, nephropathy and cardiovascular disease.   - Ophtho- up to date  - Flu and Covid vaccine- up to date  - BP- at goal, on meds  - Lipids- check before next visit  - MAC- check before next visit  - CMP- check before next visit  - Neuropathy- none  - CAD-none    3.) Lifestyle Management for Diabetes- We discussed importance of a low CHO diet, and recommend 45gm per meal or 135gm per day. We discussed the importance of trying to follow a Mediterranean diet, with an emphasis on vegetables at every meal, with lots whole grains, and protein from either plant-based sources, or poultry and fish.   - Diet- he will continue to eat healthy  - Exercise- he will exercise even more    Return to clinic in 6 weeks    Prior to this encounter, I spent over 15 minutes with preparing for the  visit, including reviewing documents from other specialties as well as from PCP and going over test results and imaging studies. During the face to face encounter, I spent an additional 15 minutes which were determined for follow-up. Greater than 50% of the time was spent in counseling, anticipatory guidance, and coordination of care. Patient concerns were answered to the best of my knowledge.        5/21/24  Mari Muse MD

## 2024-05-21 NOTE — TELEPHONE ENCOUNTER
Empagliflozin (JARDIANCE) 25 MG Oral Tab  External/Patient, Reported Routing through protocols

## 2024-05-24 RX ORDER — EMPAGLIFLOZIN 25 MG/1
25 TABLET, FILM COATED ORAL DAILY
Qty: 90 TABLET | Refills: 2 | Status: SHIPPED | OUTPATIENT
Start: 2024-05-24

## 2024-05-24 NOTE — TELEPHONE ENCOUNTER
Please Review. Protocol Failed; No Protocol   Medication is patient external   Lab Results   Component Value Date     A1C 8.7 (A) 05/21/2024       Recent Visits  Date Type Provider Dept   04/19/24 Office Visit Kaleb Marie MD [id number]   02/19/24 Office Visit Kaleb Marie MD Echnd-[facility]   01/16/24 Office Visit Kaleb Marie MD [id number]   05/04/23 Office Visit Kaleb Marie MD [id number]-Internal Med   04/18/23 Office Visit Kaleb Marie MD [id number]   Showing recent visits within past 540 days with a meds authorizing provider and meeting all other requirements  Future Appointments  Date Type Provider Dept   07/19/24 Appointment Kaleb Marie MD [id number]   Showing future appointments within next 150 days with a meds authorizing provider and meeting all other requirements    Requested Prescriptions   Pending Prescriptions Disp Refills    Empagliflozin (JARDIANCE) 25 MG Oral Tab  0     Sig: Take 25 mg by mouth daily.       Diabetes Medication Protocol Failed - 5/21/2024 12:48 PM        Failed - Last A1C < 7.5 and within past 6 months     Lab Results   Component Value Date    A1C 8.7 (A) 05/21/2024             Passed - In person appointment or virtual visit in the past 6 mos or appointment in next 3 mos     Recent Outpatient Visits              3 days ago Type 2 diabetes mellitus with hyperglycemia, with long-term current use of insulin ([facility])    [facility], [facility], Mari Butterfield MD    Office Visit    1 month ago Encounter for examination following treatment at [facility], [facility] Kaleb Rocha MD    Office Visit    1 month ago Type 2 diabetes mellitus with hyperglycemia, with long-term current use of insulin ([facility])    [facility] Mari Bowman MD    Office Visit    3 months ago Annual physical exam    [place]  [facility] [place] [facility] Agron B, [place]    Office Visit    4 months ago Hospital discharge follow-up    [facility] Kaleb Rocha MD    Office Visit          Future Appointments         Provider Department Appt Notes    In 1 month Kaleb Marie MD [facility] folow up 3 mon    In 2 months Mari Muse MD [facility] 3 months                    Passed - Microalbumin procedure in past 12 months or taking ACE/ARB        Passed - EGFRCR or GFRNAA > 50     GFR Evaluation  EGFRCR: 63 , resulted on 4/17/2024          Passed - GFR in the past 12 months               Future Appointments         Provider Department Appt Notes    In 1 month Kaleb Marie MD [facility] folow up 3 mon    In 2 months Mari Muse MD [facility] 3 months          Recent Outpatient Visits              3 days ago Type 2 diabetes mellitus with hyperglycemia, with long-term current use of insulin ([facility])    [facility] Mari Muse MD    Office Visit    1 month ago Encounter for examination following treatment at [facility], Kaleb Rocha MD    Office Visit    1 month ago Type 2 diabetes mellitus with hyperglycemia, with long-term current use of insulin ([facility]    Office Visit    3 months ago Annual physical exam    [facility] [place] She[facility] Perez Agron B, MD    Office Visit    4 months ago Hospital discharge follow-up    [facility] Kaleb Rocha MD    Office Visit

## 2024-06-06 ENCOUNTER — MED REC SCAN ONLY (OUTPATIENT)
Dept: INTERNAL MEDICINE CLINIC | Facility: CLINIC | Age: 69
End: 2024-06-06

## 2024-06-27 NOTE — TELEPHONE ENCOUNTER
Wife calling for Patient. Verified Patient's name and date of birth.  Requesting to change Lantus and Lispro from vials to pens.  Per wife Patient is not taking Trulicity anymore due to side effects.  Patient does not want to follow up with Dr Blanchard    Verified Patient is taking Lantus  60units in am  Lispro-Humalog 25 units with meals.    Pended.  Please call Patient when sent.

## 2024-07-01 RX ORDER — INSULIN GLARGINE 100 [IU]/ML
60 INJECTION, SOLUTION SUBCUTANEOUS EVERY MORNING
Qty: 15 ML | Refills: 3 | Status: SHIPPED | OUTPATIENT
Start: 2024-07-01

## 2024-07-01 RX ORDER — INSULIN LISPRO 100 [IU]/ML
INJECTION, SOLUTION INTRAVENOUS; SUBCUTANEOUS
Qty: 3 ML | Refills: 0 | Status: SHIPPED | OUTPATIENT
Start: 2024-07-01

## 2024-07-23 ENCOUNTER — OFFICE VISIT (OUTPATIENT)
Dept: INTERNAL MEDICINE CLINIC | Facility: CLINIC | Age: 69
End: 2024-07-23

## 2024-07-23 VITALS
SYSTOLIC BLOOD PRESSURE: 124 MMHG | HEIGHT: 71 IN | OXYGEN SATURATION: 98 % | TEMPERATURE: 98 F | HEART RATE: 67 BPM | RESPIRATION RATE: 17 BRPM | WEIGHT: 210 LBS | BODY MASS INDEX: 29.4 KG/M2 | DIASTOLIC BLOOD PRESSURE: 60 MMHG

## 2024-07-23 DIAGNOSIS — Z00.00 ENCOUNTER FOR PREVENTIVE CARE: ICD-10-CM

## 2024-07-23 DIAGNOSIS — I10 PRIMARY HYPERTENSION: ICD-10-CM

## 2024-07-23 DIAGNOSIS — R10.32 ABDOMINAL WALL PAIN IN LEFT LOWER QUADRANT: ICD-10-CM

## 2024-07-23 DIAGNOSIS — E11.9 TYPE 2 DIABETES MELLITUS WITHOUT COMPLICATION, WITH LONG-TERM CURRENT USE OF INSULIN (HCC): Primary | ICD-10-CM

## 2024-07-23 DIAGNOSIS — Z79.4 TYPE 2 DIABETES MELLITUS WITHOUT COMPLICATION, WITH LONG-TERM CURRENT USE OF INSULIN (HCC): Primary | ICD-10-CM

## 2024-07-23 PROCEDURE — 99214 OFFICE O/P EST MOD 30 MIN: CPT | Performed by: INTERNAL MEDICINE

## 2024-07-24 ENCOUNTER — LAB ENCOUNTER (OUTPATIENT)
Dept: LAB | Facility: HOSPITAL | Age: 69
End: 2024-07-24
Attending: INTERNAL MEDICINE
Payer: MEDICAID

## 2024-07-24 DIAGNOSIS — Z00.00 ANNUAL PHYSICAL EXAM: ICD-10-CM

## 2024-07-24 DIAGNOSIS — E11.9 TYPE 2 DIABETES MELLITUS WITHOUT COMPLICATION, WITH LONG-TERM CURRENT USE OF INSULIN (HCC): ICD-10-CM

## 2024-07-24 DIAGNOSIS — I25.10 CORONARY ARTERY DISEASE INVOLVING NATIVE CORONARY ARTERY OF NATIVE HEART WITHOUT ANGINA PECTORIS: ICD-10-CM

## 2024-07-24 DIAGNOSIS — Z79.4 TYPE 2 DIABETES MELLITUS WITHOUT COMPLICATION, WITH LONG-TERM CURRENT USE OF INSULIN (HCC): ICD-10-CM

## 2024-07-24 LAB
ALBUMIN SERPL-MCNC: 4.1 G/DL (ref 3.2–4.8)
ALBUMIN/GLOB SERPL: 1.5 {RATIO} (ref 1–2)
ALP LIVER SERPL-CCNC: 62 U/L
ALT SERPL-CCNC: 32 U/L
ANION GAP SERPL CALC-SCNC: 7 MMOL/L (ref 0–18)
AST SERPL-CCNC: 23 U/L (ref ?–34)
BASOPHILS # BLD AUTO: 0.06 X10(3) UL (ref 0–0.2)
BASOPHILS NFR BLD AUTO: 0.7 %
BILIRUB SERPL-MCNC: 0.5 MG/DL (ref 0.2–1.1)
BILIRUB UR QL: NEGATIVE
BUN BLD-MCNC: 19 MG/DL (ref 9–23)
BUN/CREAT SERPL: 21.3 (ref 10–20)
CALCIUM BLD-MCNC: 9.8 MG/DL (ref 8.7–10.4)
CHLORIDE SERPL-SCNC: 110 MMOL/L (ref 98–112)
CHOLEST SERPL-MCNC: 136 MG/DL (ref ?–200)
CLARITY UR: CLEAR
CO2 SERPL-SCNC: 25 MMOL/L (ref 21–32)
COMPLEXED PSA SERPL-MCNC: 3.17 NG/ML (ref ?–4)
CREAT BLD-MCNC: 0.89 MG/DL
CREAT UR-SCNC: 76.3 MG/DL
DEPRECATED RDW RBC AUTO: 44.5 FL (ref 35.1–46.3)
EGFRCR SERPLBLD CKD-EPI 2021: 94 ML/MIN/1.73M2 (ref 60–?)
EOSINOPHIL # BLD AUTO: 0.27 X10(3) UL (ref 0–0.7)
EOSINOPHIL NFR BLD AUTO: 3.2 %
ERYTHROCYTE [DISTWIDTH] IN BLOOD BY AUTOMATED COUNT: 14.6 % (ref 11–15)
EST. AVERAGE GLUCOSE BLD GHB EST-MCNC: 223 MG/DL (ref 68–126)
FASTING PATIENT LIPID ANSWER: YES
FASTING STATUS PATIENT QL REPORTED: YES
GLOBULIN PLAS-MCNC: 2.7 G/DL (ref 2–3.5)
GLUCOSE BLD-MCNC: 97 MG/DL (ref 70–99)
GLUCOSE UR-MCNC: >1000 MG/DL
HBA1C MFR BLD: 9.4 % (ref ?–5.7)
HCT VFR BLD AUTO: 43.8 %
HDLC SERPL-MCNC: 46 MG/DL (ref 40–59)
HGB BLD-MCNC: 14.4 G/DL
IMM GRANULOCYTES # BLD AUTO: 0.02 X10(3) UL (ref 0–1)
IMM GRANULOCYTES NFR BLD: 0.2 %
KETONES UR-MCNC: NEGATIVE MG/DL
LDLC SERPL CALC-MCNC: 72 MG/DL (ref ?–100)
LEUKOCYTE ESTERASE UR QL STRIP.AUTO: NEGATIVE
LYMPHOCYTES # BLD AUTO: 2.06 X10(3) UL (ref 1–4)
LYMPHOCYTES NFR BLD AUTO: 24.1 %
MCH RBC QN AUTO: 27.5 PG (ref 26–34)
MCHC RBC AUTO-ENTMCNC: 32.9 G/DL (ref 31–37)
MCV RBC AUTO: 83.7 FL
MICROALBUMIN UR-MCNC: 206.5 MG/DL
MICROALBUMIN/CREAT 24H UR-RTO: 2706.4 UG/MG (ref ?–30)
MONOCYTES # BLD AUTO: 0.86 X10(3) UL (ref 0.1–1)
MONOCYTES NFR BLD AUTO: 10.1 %
NEUTROPHILS # BLD AUTO: 5.27 X10 (3) UL (ref 1.5–7.7)
NEUTROPHILS # BLD AUTO: 5.27 X10(3) UL (ref 1.5–7.7)
NEUTROPHILS NFR BLD AUTO: 61.7 %
NITRITE UR QL STRIP.AUTO: NEGATIVE
NONHDLC SERPL-MCNC: 90 MG/DL (ref ?–130)
OSMOLALITY SERPL CALC.SUM OF ELEC: 296 MOSM/KG (ref 275–295)
PH UR: 6.5 [PH] (ref 5–8)
PLATELET # BLD AUTO: 182 10(3)UL (ref 150–450)
POTASSIUM SERPL-SCNC: 4.1 MMOL/L (ref 3.5–5.1)
PROT SERPL-MCNC: 6.8 G/DL (ref 5.7–8.2)
PROT UR-MCNC: 300 MG/DL
RBC # BLD AUTO: 5.23 X10(6)UL
SODIUM SERPL-SCNC: 142 MMOL/L (ref 136–145)
SP GR UR STRIP: 1.02 (ref 1–1.03)
TRIGL SERPL-MCNC: 97 MG/DL (ref 30–149)
TSI SER-ACNC: 1.95 MIU/ML (ref 0.55–4.78)
UROBILINOGEN UR STRIP-ACNC: NORMAL
VLDLC SERPL CALC-MCNC: 15 MG/DL (ref 0–30)
WBC # BLD AUTO: 8.5 X10(3) UL (ref 4–11)

## 2024-07-24 PROCEDURE — 80053 COMPREHEN METABOLIC PANEL: CPT

## 2024-07-24 PROCEDURE — 82570 ASSAY OF URINE CREATININE: CPT

## 2024-07-24 PROCEDURE — 84443 ASSAY THYROID STIM HORMONE: CPT

## 2024-07-24 PROCEDURE — 85025 COMPLETE CBC W/AUTO DIFF WBC: CPT

## 2024-07-24 PROCEDURE — 82043 UR ALBUMIN QUANTITATIVE: CPT

## 2024-07-24 PROCEDURE — 83036 HEMOGLOBIN GLYCOSYLATED A1C: CPT

## 2024-07-24 PROCEDURE — 81001 URINALYSIS AUTO W/SCOPE: CPT

## 2024-07-24 PROCEDURE — 80061 LIPID PANEL: CPT

## 2024-07-24 PROCEDURE — 36415 COLL VENOUS BLD VENIPUNCTURE: CPT

## 2024-07-24 NOTE — PROGRESS NOTES
Subjective:     Patient ID: Samy Hoskins is a 69 year old male.    HPI    Patient comes in today for follow-up he says his sugars have been up and down but he knows that he needs to do better with his diet he stopped taking the Trulicity because he could not tolerate with a lot of nausea vomiting patient also with complaint of left-sided abdominal wall pain feels like he is can have shingles again he said he felt the same when the last time he had shingles in the different area's been going on for more than a week    History/Other:   Review of Systems   Constitutional: Negative.    HENT: Negative.     Eyes: Negative.    Respiratory: Negative.     Cardiovascular: Negative.    Gastrointestinal: Negative.         Abdominal wall pain on left lower q   Genitourinary: Negative.    Musculoskeletal: Negative.    Skin: Negative.    Neurological: Negative.    Psychiatric/Behavioral: Negative.       Current Outpatient Medications   Medication Sig Dispense Refill    insulin glargine (LANTUS SOLOSTAR) 100 UNIT/ML Subcutaneous Solution Pen-injector Inject 60 Units into the skin every morning. (Patient taking differently: Inject 55 Units into the skin every morning.) 15 mL 3    Insulin Lispro, 1 Unit Dial, 100 UNIT/ML Subcutaneous Solution Pen-injector Inject 25 units into the skin in the morning, at noon, and at bedtime 3 mL 0    Empagliflozin (JARDIANCE) 25 MG Oral Tab Take 25 mg by mouth daily. 90 tablet 2    glipiZIDE 10 MG Oral Tab Take 1 tablet (10 mg total) by mouth 2 (two) times daily before meals. 90 tablet 1    Continuous Blood Gluc Sensor (FREESTYLE AJAY 3 SENSOR) Does not apply Misc 1 each every 14 (fourteen) days. 6 each 1    levothyroxine 125 MCG Oral Tab Take 1 tablet (125 mcg total) by mouth before breakfast. 90 tablet 3    rosuvastatin 20 MG Oral Tab Take 1 tablet (20 mg total) by mouth nightly. 90 tablet 3    aspirin 81 MG Oral Tab EC Take 1 tablet (81 mg total) by mouth daily. 90 tablet 3    isosorbide  mononitrate ER 30 MG Oral Tablet 24 Hr Take 1 tablet (30 mg total) by mouth daily. 90 tablet 3    ezetimibe 10 MG Oral Tab Take 1 tablet (10 mg total) by mouth nightly. 90 tablet 3    amLODIPine 10 MG Oral Tab Take 1 tablet (10 mg total) by mouth daily. 90 tablet 3    losartan 25 MG Oral Tab Take 1 tablet (25 mg total) by mouth daily. 90 tablet 3    insulin lispro (HUMALOG) 100 UNIT/ML Subcutaneous Solution Inject 20 Units into the skin in the morning, at noon, and at bedtime. 60 mL 2    Dulaglutide (TRULICITY) 1.5 MG/0.5ML Subcutaneous Solution Pen-injector Inject 1.5 mg into the skin once a week. (Patient not taking: Reported on 6/27/2024) 6 mL 0    metFORMIN HCl 1000 MG Oral Tab Take 1 tablet (1,000 mg total) by mouth 2 (two) times daily with meals. (Patient not taking: Reported on 7/23/2024) 180 tablet 3     Allergies:  Allergies   Allergen Reactions    Shrimp OTHER (SEE COMMENTS)     Throat tightness       Past Medical History:    Atherosclerosis of coronary artery    Deaf, right    Diabetes (HCC)    Essential hypertension    Hyperlipidemia    Thyroid disease      Past Surgical History:   Procedure Laterality Date    Coronary stent placement      Rotator cuff repair Left       No family history on file.   Social History:   Social History     Socioeconomic History    Marital status:    Tobacco Use    Smoking status: Former     Passive exposure: Never    Smokeless tobacco: Never   Vaping Use    Vaping status: Never Used   Substance and Sexual Activity    Alcohol use: Not Currently    Drug use: Never     Social Determinants of Health     Financial Resource Strain: Low Risk  (1/9/2024)    Financial Resource Strain     Difficulty of Paying Living Expenses: Not hard at all     Med Affordability: No   Food Insecurity: No Food Insecurity (12/30/2023)    Food Insecurity     Food Insecurity: Never true   Transportation Needs: No Transportation Needs (1/9/2024)    Transportation Needs     Lack of Transportation: No    Housing Stability: Low Risk  (12/30/2023)    Housing Stability     Housing Instability: No        Objective:   Physical Exam  Vitals and nursing note reviewed.   Constitutional:       Appearance: He is well-developed.   HENT:      Head: Normocephalic and atraumatic.      Right Ear: External ear normal.      Left Ear: External ear normal.      Nose: Nose normal.   Eyes:      Conjunctiva/sclera: Conjunctivae normal.      Pupils: Pupils are equal, round, and reactive to light.   Cardiovascular:      Rate and Rhythm: Normal rate and regular rhythm.      Heart sounds: Normal heart sounds.   Pulmonary:      Effort: Pulmonary effort is normal.      Breath sounds: Normal breath sounds.   Abdominal:      General: Bowel sounds are normal.      Palpations: Abdomen is soft.      Comments: Abdominal wall pain on left lower q    No pain with touch    Genitourinary:     Penis: Normal.       Prostate: Normal.      Rectum: Normal.   Musculoskeletal:         General: Normal range of motion.      Cervical back: Normal range of motion and neck supple.   Skin:     General: Skin is warm and dry.   Neurological:      Mental Status: He is alert and oriented to person, place, and time.      Deep Tendon Reflexes: Reflexes are normal and symmetric.         Assessment & Plan:   1. Type 2 diabetes mellitus without complication, with long-term current use of insulin (HCC) watch diet closer follow-up with endocrine   2. Encounter for preventive care will refer to GI   3. Primary hypertension continue current treatment   4. Abdominal wall pain in left lower quadrant exam benign we will monitor if develops any rash patient to let us know       No orders of the defined types were placed in this encounter.      Meds This Visit:  Requested Prescriptions      No prescriptions requested or ordered in this encounter       Imaging & Referrals:  PODIATRY - INTERNAL  GASTRO - INTERNAL

## 2024-08-03 ENCOUNTER — TELEPHONE (OUTPATIENT)
Dept: INTERNAL MEDICINE CLINIC | Facility: CLINIC | Age: 69
End: 2024-08-03

## 2024-08-03 DIAGNOSIS — B02.9 HERPES ZOSTER WITHOUT COMPLICATION: Primary | ICD-10-CM

## 2024-08-03 PROCEDURE — 99213 OFFICE O/P EST LOW 20 MIN: CPT | Performed by: INTERNAL MEDICINE

## 2024-08-03 RX ORDER — VALACYCLOVIR HYDROCHLORIDE 1 G/1
1000 TABLET, FILM COATED ORAL EVERY 8 HOURS SCHEDULED
Qty: 21 TABLET | Refills: 0 | Status: SHIPPED | OUTPATIENT
Start: 2024-08-03 | End: 2024-08-10

## 2024-08-03 NOTE — TELEPHONE ENCOUNTER
Patient ID: Samy Hoskins is a 69 year old male.  Chief Complaint   Patient presents with    Rash        Virtual/Telephone Check-In    Samy Hoskins verbally consents to a Virtual/Telephone Check-In service on 08/03/24. Patient understands and accepts financial responsibility for any deductible, co-insurance and/or co-pays associated with this service.  Patient call triage note reviewed.    HISTORY OF PRESENT ILLNESS:   Patient presents for above.  This is a telephone visit.  On-call page received in return.  Patient has developed a rash on his back and near axillary region on right side.  Does have a history of shingles with similar distribution in the past.  Was experiencing some discomfort 1 week ago but rash appeared overnight.  He is diabetic.  Video visit was established to gauge characteristics of rash.    MEDICAL HISTORY:     Past Medical History:    Atherosclerosis of coronary artery    Deaf, right    Diabetes (HCC)    Essential hypertension    Hyperlipidemia    Thyroid disease       Past Surgical History:   Procedure Laterality Date    Coronary stent placement      Rotator cuff repair Left          Current Outpatient Medications:     valACYclovir 1 G Oral Tab, Take 1 tablet (1,000 mg total) by mouth every 8 (eight) hours for 7 days., Disp: 21 tablet, Rfl: 0    insulin glargine (LANTUS SOLOSTAR) 100 UNIT/ML Subcutaneous Solution Pen-injector, Inject 60 Units into the skin every morning. (Patient taking differently: Inject 55 Units into the skin every morning.), Disp: 15 mL, Rfl: 3    Insulin Lispro, 1 Unit Dial, 100 UNIT/ML Subcutaneous Solution Pen-injector, Inject 25 units into the skin in the morning, at noon, and at bedtime, Disp: 3 mL, Rfl: 0    Empagliflozin (JARDIANCE) 25 MG Oral Tab, Take 25 mg by mouth daily., Disp: 90 tablet, Rfl: 2    glipiZIDE 10 MG Oral Tab, Take 1 tablet (10 mg total) by mouth 2 (two) times daily before meals., Disp: 90 tablet, Rfl: 1    Continuous Blood Gluc Sensor (FREESTYLE  AJAY 3 SENSOR) Does not apply Misc, 1 each every 14 (fourteen) days., Disp: 6 each, Rfl: 1    levothyroxine 125 MCG Oral Tab, Take 1 tablet (125 mcg total) by mouth before breakfast., Disp: 90 tablet, Rfl: 3    rosuvastatin 20 MG Oral Tab, Take 1 tablet (20 mg total) by mouth nightly., Disp: 90 tablet, Rfl: 3    aspirin 81 MG Oral Tab EC, Take 1 tablet (81 mg total) by mouth daily., Disp: 90 tablet, Rfl: 3    isosorbide mononitrate ER 30 MG Oral Tablet 24 Hr, Take 1 tablet (30 mg total) by mouth daily., Disp: 90 tablet, Rfl: 3    ezetimibe 10 MG Oral Tab, Take 1 tablet (10 mg total) by mouth nightly., Disp: 90 tablet, Rfl: 3    amLODIPine 10 MG Oral Tab, Take 1 tablet (10 mg total) by mouth daily., Disp: 90 tablet, Rfl: 3    losartan 25 MG Oral Tab, Take 1 tablet (25 mg total) by mouth daily., Disp: 90 tablet, Rfl: 3    insulin lispro (HUMALOG) 100 UNIT/ML Subcutaneous Solution, Inject 20 Units into the skin in the morning, at noon, and at bedtime., Disp: 60 mL, Rfl: 2    Allergies:  Allergies   Allergen Reactions    Shrimp OTHER (SEE COMMENTS)     Throat tightness       Social History     Socioeconomic History    Marital status:      Spouse name: Not on file    Number of children: Not on file    Years of education: Not on file    Highest education level: Not on file   Occupational History    Not on file   Tobacco Use    Smoking status: Former     Passive exposure: Never    Smokeless tobacco: Never   Vaping Use    Vaping status: Never Used   Substance and Sexual Activity    Alcohol use: Not Currently    Drug use: Never    Sexual activity: Not on file   Other Topics Concern    Not on file   Social History Narrative    Not on file     Social Determinants of Health     Financial Resource Strain: Low Risk  (1/9/2024)    Financial Resource Strain     Difficulty of Paying Living Expenses: Not hard at all     Med Affordability: No   Food Insecurity: No Food Insecurity (12/30/2023)    Food Insecurity     Food  Insecurity: Never true   Transportation Needs: No Transportation Needs (1/9/2024)    Transportation Needs     Lack of Transportation: No   Physical Activity: Not on file   Stress: Not on file   Social Connections: Not on file   Housing Stability: Low Risk  (12/30/2023)    Housing Stability     Housing Instability: No     Housing Instability Emergency: Not on file     Crib or Bassinette: Not on file       PHYSICAL EXAM:   Able to establish a video connection with patient to examine rash.  Does have multiple vesicular lesions in right axillary region and right side of mid back.  Lesions are in multiple dermatomes but have not cross midline.    ASSESSMENT/PLAN:   1. Herpes zoster without complication  valACYclovir 1 G Oral Tab; Take 1 tablet (1,000 mg total) by mouth every 8 (eight) hours for 7 days.  Dispense: 21 tablet; Refill: 0  Keep area covered to prevent spread to other people.  Not contagious only after last lesion crusts over.    Time spent on encounter  11 minutes   Telephone time 6 minutes   Documentation time 5 minutes     Samy Hoskins understands phone evaluation is not a substitute for face-to-face examination or emergency care. Patient advised to go to ER or call 911 for worsening symptoms or acute distress.    Please note that the following visit was completed using two-way, real-time interactive audio and/or video communication.  This has been done in good padmini to provide continuity of care in the best interest of the provider-patient relationship, due to the ongoing public health crisis/national emergency and because of restrictions of visitation.  There are limitations of this visit as no physical exam could be performed.  Every conscious effort was taken to allow for sufficient and adequate time.  This billing was spent on reviewing labs, medications, radiology tests and decision making.  Appropriate medical decision-making and tests are ordered as detailed in the plan of care above.    This note  was prepared using Dragon Medical voice recognition dictation software. As a result errors may occur. When identified these errors have been corrected. While every attempt is made to correct errors during dictation discrepancies may still exist.    Jonatan Yo MD  8/3/2024

## 2024-08-12 ENCOUNTER — TELEPHONE (OUTPATIENT)
Dept: INTERNAL MEDICINE CLINIC | Facility: CLINIC | Age: 69
End: 2024-08-12

## 2024-08-12 RX ORDER — INSULIN LISPRO 100 [IU]/ML
INJECTION, SOLUTION INTRAVENOUS; SUBCUTANEOUS
Qty: 15 ML | Refills: 0 | Status: SHIPPED | OUTPATIENT
Start: 2024-08-12

## 2024-08-12 RX ORDER — EZETIMIBE 10 MG/1
10 TABLET ORAL NIGHTLY
Qty: 90 TABLET | Refills: 0 | Status: SHIPPED | OUTPATIENT
Start: 2024-08-12

## 2024-08-12 RX ORDER — INSULIN GLARGINE 100 [IU]/ML
55 INJECTION, SOLUTION SUBCUTANEOUS EVERY MORNING
Qty: 15 ML | Refills: 3 | Status: SHIPPED | OUTPATIENT
Start: 2024-08-12

## 2024-08-12 RX ORDER — LEVOTHYROXINE SODIUM 0.12 MG/1
125 TABLET ORAL
Qty: 90 TABLET | Refills: 0 | Status: SHIPPED | OUTPATIENT
Start: 2024-08-12

## 2024-08-12 RX ORDER — INSULIN LISPRO 100 [IU]/ML
INJECTION, SOLUTION INTRAVENOUS; SUBCUTANEOUS
Qty: 15 ML | Refills: 0 | Status: SHIPPED | OUTPATIENT
Start: 2024-08-12 | End: 2024-08-12

## 2024-08-12 RX ORDER — ISOSORBIDE MONONITRATE 30 MG/1
30 TABLET, EXTENDED RELEASE ORAL DAILY
Qty: 90 TABLET | Refills: 0 | Status: SHIPPED | OUTPATIENT
Start: 2024-08-12

## 2024-08-12 NOTE — TELEPHONE ENCOUNTER
Pt called asking for refills on meds, most importantly, the insulins.  Pt ran out over the weekend.     Informed him to make sure he called a 1wk prior to running out to ensure meds get refilled on time and doesn't have to go a couple of days w/out meds.

## 2024-08-13 NOTE — TELEPHONE ENCOUNTER
On Call - Spoke with wife.    He ran out of his insulin and they were suppose to refill today.    Message left on pharmacy machine    Script also sent     Katie An DNP  Working with

## 2024-09-03 DIAGNOSIS — E11.65 TYPE 2 DIABETES MELLITUS WITH HYPERGLYCEMIA, WITH LONG-TERM CURRENT USE OF INSULIN (HCC): ICD-10-CM

## 2024-09-03 DIAGNOSIS — Z79.4 TYPE 2 DIABETES MELLITUS WITH HYPERGLYCEMIA, WITH LONG-TERM CURRENT USE OF INSULIN (HCC): ICD-10-CM

## 2024-09-04 RX ORDER — GLIPIZIDE 10 MG/1
10 TABLET ORAL
Qty: 90 TABLET | Refills: 1 | Status: SHIPPED | OUTPATIENT
Start: 2024-09-04 | End: 2024-12-03

## 2024-09-04 NOTE — TELEPHONE ENCOUNTER
Endocrine Refill protocol for oral and injectable diabetic medications    Protocol Criteria:  FAILED  Reason: Elevated A1C  -Appointment with Endocrinology completed in the last 6 months or scheduled in the next 3 months    -A1c result below 8.5% in the past 6 months      Verify the above has been completed or scheduled in the appropriate timeline. If so can send a 90 day supply with 1 refill.     Last completed office visit: 5/21/2024 Mari Muse MD   Next scheduled Follow up:   Future Appointments   Date Time Provider Department Center   10/2/2024  2:30 PM Anh Oates APRN ECCFHGASTRO [facility]   10/18/2024  1:45 PM Colette Kumar MD ECWMOENDO [facility]   10/29/2024  1:30 PM Kaleb Marie MD [id number] [facility]   12/19/2024  4:20 PM Marilou Gudino DPM [id number] [facility]      Last A1c result: Last A1c value was 9.4% done 7/24/2024.

## 2024-09-06 ENCOUNTER — TELEPHONE (OUTPATIENT)
Dept: ENDOCRINOLOGY CLINIC | Facility: CLINIC | Age: 69
End: 2024-09-06

## 2024-09-06 RX ORDER — FUROSEMIDE 40 MG
40 TABLET ORAL 2 TIMES DAILY
Qty: 180 TABLET | Refills: 3 | Status: SHIPPED | OUTPATIENT
Start: 2024-09-06

## 2024-09-06 NOTE — TELEPHONE ENCOUNTER
Please review. Protocol Pass    Please advise if refill is appropriate.    Requested Prescriptions   Pending Prescriptions Disp Refills    FUROSEMIDE 40 MG Oral Tab [Pharmacy Med Name: Furosemide 40 Mg Tab Risi] 90 tablet 0     Sig: TAKE ONE TABLET BY MOUTH TWICE DAILY       Hypertension Medications Protocol Passed - 9/6/2024  4:20 PM        Passed - CMP or BMP in past 12 months        Passed - Last BP reading less than 140/90     BP Readings from Last 1 Encounters:   07/23/24 124/60               Passed - In person appointment or virtual visit in the past 12 mos or appointment in next 3 mos     Recent Outpatient Visits              1 month ago Type 2 diabetes mellitus without complication, with long-term current use of insulin ([facility])    [facility] Kaleb Marie MD    Office Visit    3 months ago Type 2 diabetes mellitus with hyperglycemia, with long-term current use of insulin ([facility])    [facility] Mari Muse MD    Office Visit    4 months ago Encounter for examination following treatment at hospital    [facility] Kaleb Marie MD    Office Visit    5 months ago Type 2 diabetes mellitus with hyperglycemia, with long-term current use of insulin ([facility])    [facility]    Office Visit    6 months ago Annual physical exam    [facility]    [facility] Visit          Future Appointments         Provider Department Appt Notes    In 3 weeks Anh Oates APRN [facility] CLN screen (Declined TCS - Policy informed)    In 1 month Colette Kumar MD [facility] Diabetes - transferring care from  7/25/2024 lex    In 1 month Kaleb Marie MD [facility],  [facility] 3 month fu    In 3 months Marilou Gudino DPM [facility], [facility] dm foot check, appt sched by pt's daughter, policy informed                    Passed - EGFRCR or GFRNAA > 50     GFR Evaluation  EGFRCR: 94 , resulted on 7/24/2024                 Future Appointments         Provider Department Appt Notes    In 3 weeks Anh Oates APRN [facility] CLN screen (Declined TCS - Policy informed)    In 1 month Colette Kumar MD [facility] Diabetes - transferring care from  7/25/2024 lex    In 1 month Kaleb Marie MD [facility] 3 month fu    In 3 months Marilou Gudino DPM [facility] dm foot check, appt sched by pt's daughter, policy informed          Recent Outpatient Visits              1 month ago Type 2 diabetes mellitus without complication, with long-term current use of insulin ([facility])    [facility]    Office Visit    3 months ago Type 2 diabetes mellitus with hyperglycemia, with long-term current use of insulin ([facility])    [facility] Mari Muse MD    Office Visit    4 months ago Encounter for examination following treatment at hospital    [facility] Kaleb Marie MD    Office Visit    5 months ago Type 2 diabetes mellitus with hyperglycemia, with long-term current use of insulin ([facility])    [facility]    Office Visit    6 months ago Annual physical exam    [facility]    Office Visit

## 2024-09-06 NOTE — TELEPHONE ENCOUNTER
Patients wife called with questions about whether he should take glipizide or metformin.  Please call.

## 2024-09-06 NOTE — TELEPHONE ENCOUNTER
Refills team - yes patient continues to take Furosemide 40mg twice a day       Wife and Patient called office. Date of birth and full name both confirmed.   They checked patient's medications. He currently takes furosemide 40mg, twice a day . Has not skipped any doses or stopped this medication.         Future Appointments   Date Time Provider Department Center   10/2/2024  2:30 PM Anh Oates APRN [facility]   10/18/2024  1:45 PM Colette Kumar MD ECWMOENDO [facility]   10/29/2024  1:30 PM Kaleb Marie MD [id number]  [place] [facility]   12/19/2024  4:20 PM Marilou Gudino DPM [facility]

## 2024-09-11 NOTE — TELEPHONE ENCOUNTER
Called Nurys back and she said she already has the answer, received the refill for Glipizide, and has no further questions.

## 2024-09-16 RX ORDER — INSULIN LISPRO 100 [IU]/ML
25 INJECTION, SOLUTION INTRAVENOUS; SUBCUTANEOUS 3 TIMES DAILY
Qty: 75 ML | Refills: 0 | Status: SHIPPED | OUTPATIENT
Start: 2024-09-16

## 2024-09-16 RX ORDER — INSULIN GLARGINE 100 [IU]/ML
60 INJECTION, SOLUTION SUBCUTANEOUS EVERY MORNING
Qty: 60 ML | Refills: 3 | Status: SHIPPED | OUTPATIENT
Start: 2024-09-16

## 2024-09-16 RX ORDER — INSULIN LISPRO 100 [IU]/ML
25 INJECTION, SOLUTION INTRAVENOUS; SUBCUTANEOUS 3 TIMES DAILY
Qty: 69 ML | Refills: 0 | OUTPATIENT
Start: 2024-09-16

## 2024-09-16 RX ORDER — PEN NEEDLE, DIABETIC 30 GX3/16"
1 NEEDLE, DISPOSABLE MISCELLANEOUS 4 TIMES DAILY
Qty: 400 EACH | Refills: 3 | Status: SHIPPED | OUTPATIENT
Start: 2024-09-16

## 2024-09-16 RX ORDER — BLOOD-GLUCOSE SENSOR
1 EACH MISCELLANEOUS
Qty: 6 EACH | Refills: 3 | Status: SHIPPED | OUTPATIENT
Start: 2024-09-16

## 2024-09-16 NOTE — TELEPHONE ENCOUNTER
Refills needed for both insulins, Lispro and Glargine.  No order found for pen needles, new order created.     From 4/25/24 with Diabetes educator:  \"Review of recent MD office note indicates he was instructed to continue with Lantus and stop Humalog in addition to Trulicity. Contact Dr Muse during the visit and confirmed patient had been instructed to continue long acting insulin and stop rapid acting insulin. Reviewed MD recommendations with him and he verbalized understanding.\"    Endocrinologist Dr. Muse office visit 5/21/24 : \"insulin lispro (HUMALOG) 100 UNIT/ML Subcutaneous Solution, Inject 20 Units into the skin in the morning, at noon, and at bedtime. (Patient not taking: Reported on 5/21/2024).  Medications for DM:  Insulin Glargine/ Lantus 42 units qAM;   Trulicity 1.5mg q weekly   Jardiance 25mg PO qday;   Metformin 500mg PO qday.       Checking 4-5 times per day with Jessica\"      From telephone encounter 6/27/24\" Requesting to change Lantus and Lispro from vials to pens.  Per wife Patient is not taking Trulicity anymore due to side effects.  Patient does not want to follow up with Dr Kumar    Verified Patient is taking Lantus 60units in am\"    Office visit Dr. Kaleb Marie 7/23/24 : \"he stopped taking the Trulicity because he could not tolerate with a lot of nausea vomiting \"

## 2024-09-16 NOTE — TELEPHONE ENCOUNTER
Patient is out of sensors   Wife calling saying she put the request in Friday   Advised we need 3 business days moving forward for refill requests (advised sat and Sunday no business days)

## 2024-09-16 NOTE — TELEPHONE ENCOUNTER
Please review.  Protocol failed / Has no protocol.     Marked High Priority, patient states out of medication    Thorough chart review completed regarding trulicity and insulins - notes are below.     Insulins are pended for 90-day supplies plus refills.  New order created for pen needles (no previous order found)     Requested Prescriptions   Pending Prescriptions Disp Refills    insulin glargine (LANTUS SOLOSTAR) 100 UNIT/ML Subcutaneous Solution Pen-injector 60 mL 3     Sig: Inject 60 Units into the skin every morning.       Diabetes Medication Protocol Failed - 9/16/2024  1:21 PM        Failed - Last A1C < 7.5 and within past 6 months     Lab Results   Component Value Date    A1C 9.4 (H) 07/24/2024             Passed - In person appointment or virtual visit in the past 6 mos or appointment in next 3 mos     Recent Outpatient Visits              1 month ago Type 2 diabetes mellitus without complication, with long-term current use of insulin ([facility])    [facility]    Office Visit    3 months ago Type 2 diabetes mellitus with hyperglycemia, with long-term current use of insulin ([facility])    [facility] Mari Muse MD    Office Visit    5 months ago Encounter for examination following treatment at hospital    [facility] Kaleb Marie MD    Office Visit    5 months ago Type 2 diabetes mellitus with hyperglycemia, with long-term current use of insulin ([facility])    [facility]    Office Visit    7 months ago Annual physical exam    [facility] [place] [facility]    Office Visit          Future Appointments         Provider Department Appt Notes    In 2 weeks Anh Oates APRN [facility] CLN screen (Declined TCS -  Policy informed)    In 1 month Co[facility] Kumar MD [facility] Diabetes - transferring care from  7/25/2024 lex    In 1 month Kaleb Marie MD [facility] 3 month fu    In 3 months Marilou Gudino DPM [facility] dm foot check, appt sched by pt's daughter, policy informed                    Passed - Microalbumin procedure in past 12 months or taking ACE/ARB        Passed - EGFRCR or GFRNAA > 50     GFR Evaluation  EGFRCR: 94 , resulted on 7/24/2024          Passed - GFR in the past 12 months          Insulin Lispro, 1 Unit Dial, 100 UNIT/ML Subcutaneous Solution Pen-injector 75 mL 0     Sig: Inject 25 Units into the skin 3 (three) times daily. Inject in the morning, at noon, and at bedtime       Diabetes Medication Protocol Failed - 9/16/2024  1:21 PM        Failed - Last A1C < 7.5 and within past 6 months     Lab Results   Component Value Date    A1C 9.4 (H) 07/24/2024             Passed - In person appointment or virtual visit in the past 6 mos or appointment in next 3 mos     Recent Outpatient Visits              1 month ago Type 2 diabetes mellitus without complication, with long-term current use of insulin ([facility])    [facility] Kaleb Marie MD    Office Visit    3 months ago Type 2 diabetes mellitus with hyperglycemia, with long-term current use of insulin ([facility])    [facility]    Office Visit    5 months ago Encounter for examination following treatment at hospital    [facility] Kaleb Marie MD    Office Visit    5 months ago Type 2 diabetes mellitus with hyperglycemia, with long-term current use of insulin ([facility])    [facility]    Office Visit    7  months ago Annual physical exam    [facility] [place] [facility], Kaleb Calzada MD    Office Visit          Future Appointments         Provider Department Appt Notes    In 2 weeks Anh Oates APRN [facility] CLN screen (Declined TCS - Policy informed)    In 1 month Colette Kumar MD [facility] Diabetes - transferring care from  7/25/2024 jmb    In 1 month Kaleb Marie MD [facility] 3 month fu    In 3 months Marilou Gudino DPM [facility] dm foot check, appt sched by pt's daughter, policy informed                    Passed - Microalbumin procedure in past 12 months or taking ACE/ARB        Passed - EGFRCR or GFRNAA > 50     GFR Evaluation  EGFRCR: 94 , resulted on 7/24/2024          Passed - GFR in the past 12 months          Insulin Pen Needle (PEN NEEDLES) 32G X 4 MM Does not apply Misc 400 each 3     Sig: Inject 1 Needle into the skin 4 (four) times daily. For use with insulins       Diabetic Supplies Protocol Passed - 9/16/2024  1:21 PM        Passed - In person appointment or virtual visit in the past 12 mos or appointment in next 3 mos     Recent Outpatient Visits              1 month ago Type 2 diabetes mellitus without complication, with long-term current use of insulin ([facility])    [facility]    Office Visit    3 months ago Type 2 diabetes mellitus with hyperglycemia, with long-term current use of insulin ([facility])    [facility] Mari Muse MD    Office Visit    5 months ago Encounter for examination following treatment at hospital    [facility]    Office Visit    5 months ago Type 2 diabetes mellitus with hyperglycemia, with  long-term current use of insulin (HCC)    [facility]    Office Visit    7 months ago Annual physical exam    [facility] [place] [facility] Agron B, MD    Office Visit          Future Appointments         Provider Department Appt Notes    In 2 weeks Anh Oates APRN [facility] CLN screen (Declined TCS - Policy informed)    In 1 month Colette Kumar MD Counts include 234 beds at the [facility] Diabetes - transferring care from  7/25/2024 jmb    In 1 month Kaleb Marie MD [facility] 3 month fu    In 3 months Marilou Gudino DPM [facility] dm foot check, appt sched by pt's daughter, policy informed                     Refused Prescriptions Disp Refills    Insulin Lispro, 1 Unit Dial, 100 UNIT/ML Subcutaneous Solution Pen-injector [Pharmacy Med Name: Insulin Lispro Kwikpen 100 Unit/Ml Inj Lill] 69 mL 0     Sig: Inject 25 Units into the skin 3 (three) times daily. Inject in the morning, at noon, and at bedtime       Diabetes Medication Protocol Failed - 9/16/2024  1:21 PM        Failed - Last A1C < 7.5 and within past 6 months     Lab Results   Component Value Date    A1C 9.4 (H) 07/24/2024             Passed - In person appointment or virtual visit in the past 6 mos or appointment in next 3 mos     Recent Outpatient Visits              1 month ago Type 2 diabetes mellitus without complication, with long-term current use of insulin (HCC)    [facility] Kaleb Marie MD    Office Visit    3 months ago Type 2 diabetes mellitus with hyperglycemia, with long-term current use of insulin ([facility])    Counts include 234 beds at the [facility] Mari Muse MD    Office Visit    5 months ago Encounter for  examination following treatment at hospital    [facility] Kaleb Marie MD    Office Visit    5 months ago Type 2 diabetes mellitus with hyperglycemia, with long-term current use of insulin (HCC)    [facility]    Office Visit    7 months ago Annual physical exam    [facility] [place] Sheeba Perez Agron B, MD    Office Visit          Future Appointments         Provider Department Appt Notes    In 2 weeks Anh Oates APRN [facility] CLN screen (Declined TCS - Policy informed)    In 1 month Colette Kumar MD [facility] Diabetes - transferring care from  7/25/2024 [facility]    In 1 month Kaleb Marie MD [facility] 3 month fu    In 3 months [facility], appt sched by pt's daughter, policy informed                    Passed - Microalbumin procedure in past 12 months or taking ACE/ARB        Passed - EGFRCR or GFRNAA > 50     GFR Evaluation  EGFRCR: 94 , resulted on 7/24/2024          Passed - GFR in the past 12 months           Future Appointments         Provider Department Appt Notes    In 2 weeks Anh Oates APRN [facility] CLN screen (Declined TCS - Policy informed)    In 1 month Colette Kumar MD [facility] Diabetes - transferring care from  7/25/2024 [facility]    In 1 month Kaleb Marie MD [facility] 3 month fu    In 3 months [facility], appt sched by pt's daughter, policy informed          Recent Outpatient Visits              1 month ago  Type 2 diabetes mellitus without complication, with long-term current use of insulin ([facility])    [facility] Kaleb Rocha MD    Office Visit    3 months ago Type 2 diabetes mellitus with hyperglycemia, with long-term current use of insulin ([facility])    [facility], [facility] Mari Butterfield MD    Office Visit    5 months ago Encounter for examination following treatment at hospital    [facility] Kaleb R[facility] MD    Office Visit    5 months ago Type 2 diabetes mellitus with hyperglycemia, with long-term current use of insulin ([facility])    [facility], [facility] Sudha K, MD    Office Visit    7 months ago Annual physical exam    [facility], [place] Agron B, MD    Office Visit

## 2024-09-16 NOTE — TELEPHONE ENCOUNTER
Refill passed per [facility] protocols.    Requested Prescriptions   Pending Prescriptions Disp Refills    Continuous Glucose Sensor (FREESTYLE JESSICA 3 SENSOR) Does not apply Misc [Pharmacy Med Name: Freestyle Jessica 3/Sensor/Glucose Monitoring System Kit Abbo] 6 each 3     Sig: Inject 1 each into the skin every 14 (fourteen) days.       Diabetic Supplies Protocol Passed - 9/16/2024 12:31 PM        Passed - In person appointment or virtual visit in the past 12 mos or appointment in next 3 mos     Recent Outpatient Visits              1 month ago Type 2 diabetes mellitus without complication, with long-term current use of insulin ([facility])    [facility] Kaleb Marie MD    Office Visit    3 months ago Type 2 diabetes mellitus with hyperglycemia, with long-term current use of insulin ([facility])    [facility] Mari Muse MD    Office Visit    5 months ago Encounter for examination following treatment at hospital    [facility] Kaleb Marie MD    Office Visit    5 months ago Type 2 diabetes mellitus with hyperglycemia, with long-term current use of insulin ([facility]    [facility] Visit    7 months ago [facility]          Future Appointments         Provider Department Appt Notes    In 2 weeks Anh Oates APRN [facility] CLN screen (Declined TCS - Policy informed)    In 1 month Colette Kumar MD [facility] Diabetes - transferring care from  7/25/2024 jmb    In 1 month Kaleb Marie MD [facility] 3 month fu    In 3 months Marilou Gudino DPM [facility]  [facility] dm foot check, appt sched by pt's daughter, policy informed

## 2024-09-16 NOTE — TELEPHONE ENCOUNTER
Wife called to check status.  Advised refill request was routed to provider and we are awaiting review.

## 2024-09-16 NOTE — TELEPHONE ENCOUNTER
Patient is out of medication.   Wife calling saying she put the request in Friday   Advised we need 3 business days moving forward for refill requests (advised sat and Sunday no business days)    She states understanding and will be sure to request sooner moving forward.

## 2024-09-24 ENCOUNTER — MED REC SCAN ONLY (OUTPATIENT)
Dept: INTERNAL MEDICINE CLINIC | Facility: CLINIC | Age: 69
End: 2024-09-24

## 2024-09-24 ENCOUNTER — TELEPHONE (OUTPATIENT)
Dept: INTERNAL MEDICINE CLINIC | Facility: CLINIC | Age: 69
End: 2024-09-24

## 2024-10-02 ENCOUNTER — OFFICE VISIT (OUTPATIENT)
Facility: CLINIC | Age: 69
End: 2024-10-02
Payer: MEDICAID

## 2024-10-02 ENCOUNTER — TELEPHONE (OUTPATIENT)
Facility: CLINIC | Age: 69
End: 2024-10-02

## 2024-10-02 ENCOUNTER — LAB ENCOUNTER (OUTPATIENT)
Dept: LAB | Facility: HOSPITAL | Age: 69
End: 2024-10-02
Payer: MEDICAID

## 2024-10-02 VITALS
BODY MASS INDEX: 30.8 KG/M2 | SYSTOLIC BLOOD PRESSURE: 121 MMHG | HEART RATE: 67 BPM | DIASTOLIC BLOOD PRESSURE: 64 MMHG | WEIGHT: 220 LBS | HEIGHT: 71 IN

## 2024-10-02 DIAGNOSIS — K21.9 GASTROESOPHAGEAL REFLUX DISEASE, UNSPECIFIED WHETHER ESOPHAGITIS PRESENT: ICD-10-CM

## 2024-10-02 DIAGNOSIS — Z86.0100 PERSONAL HISTORY OF COLON POLYPS, UNSPECIFIED: ICD-10-CM

## 2024-10-02 DIAGNOSIS — Z12.11 COLON CANCER SCREENING: Primary | ICD-10-CM

## 2024-10-02 DIAGNOSIS — R10.33 PERIUMBILICAL ABDOMINAL PAIN: ICD-10-CM

## 2024-10-02 DIAGNOSIS — R63.0 APPETITE LOSS: ICD-10-CM

## 2024-10-02 DIAGNOSIS — K59.00 CONSTIPATION, UNSPECIFIED CONSTIPATION TYPE: ICD-10-CM

## 2024-10-02 DIAGNOSIS — R11.0 NAUSEA: ICD-10-CM

## 2024-10-02 PROCEDURE — 83013 H PYLORI (C-13) BREATH: CPT

## 2024-10-02 PROCEDURE — 99204 OFFICE O/P NEW MOD 45 MIN: CPT

## 2024-10-02 NOTE — TELEPHONE ENCOUNTER
Dr Marie    Please advise on diabetic insulin adjustment orders based on the following diet modifications prior to procedure: Colonoscopy/EGD on 10/18/2024    Day before the procedure, patient will be on clear liquid diet only after breakfast.    Thank you

## 2024-10-02 NOTE — TELEPHONE ENCOUNTER
Scheduled for:  Colonoscopy 89864 / EGD 94986  Location:  [facility]     Provider: Shaun Malave MD    Date:  10/18/2024  Friday  Time:   8:15 AM (Patient made aware will receive phone call the day before with an arrival time)    Sedation:  MAC  Prep:  Split GoLytely    Diagnosis with codes:    ICD-10-CM   1. Colon cancer screening  Z12.11   2. Constipation, unspecified constipation type  K59.00   3. Appetite loss  R63.0   4. Gastroesophageal reflux disease, unspecified whether esophagitis present  K21.9   5. Nausea  R11.0        Meds/Allergies Reconciled?:   Provider Reviewed   Was patient informed to call insurance with codes (Y/N):  Yes  Referral sent?: Referral was sent at the time of electronic surgical scheduling.  [facility] or [facility] notified?: I sent an electronic request to ENDO Scheduling and received a confirmation today.     Medication Orders:  Patient is aware to NOT take iron pills, herbal meds and diet supplements for 7 days before exam. Also to NOT take any form of alcohol, recreational drugs and any forms of ED meds 24 hours before exam.     *HOLD glipizide day before & day of procedure  *HOLD jardiance for 4 days prior to procedure     If you are prescribed insulin please reach out to your endocrine provider (or primary care provider) for insulin dosing prior to procedure. Failure to adjust insulin can result in procedure cancellations.    Misc Orders:  N/A   Further instructions given by staff:  I Provided prep instructions to patient and reviewed date, time and location. Patient verbalized that  He / His understood and is aware to call with any questions.  Patient was informed about the new cancellation policy for He / His procedure. Patient was also given copy of the cancellation policy at the time of the appointment and verbalized understanding.

## 2024-10-02 NOTE — H&P
[facility] - Gastroenterology                                                                                                  Clinic History and Physical     Chief Complaint   Patient presents with    Colonoscopy Screening       Requesting physician or provider: Kaleb Marie MD    HPI:   Samy Hoskins is a 69 year old year-old male with active diagnoses including type 2 diabetes, dyslipidemia, hypertension, coronary artery disease, cochlear implant in place. Prior medical/surgical hx in note table. The patient presents for multiple GI symptoms    #constipation  #nausea  #appetite loss  #abdominal pain  -constipation the past 6 months. Reports bowel movement every 2-3 days. Stool is soft to firm, sometimes bristol type 1  -periumbilical abdominal pain, lower appetite and nausea the past 2 months. Does report 2-3 months started new insulin type and trulicity. He stopped both of these after a couple weeks because side effects of nausea, dizziness, headache     #GERD  -has frequent heartburn, not on antacids. Bothersome a few days every 1-2 weeks.     Patient denies any GI symptoms of vomiting, dysphagia, hematemesis, diarrhea, hematochezia, or melena.  Additionally there is no unintentional weight loss.    Last colonoscopy:   2022 @ [facility]   - polyp per patient     2017 @ [facility] - 5 year recall   FINAL DIAGNOSIS   TRANSVERSE COLON, POLYP; BIOPSY:    -TUBULAR ADENOMA     Last EGD: ?2012  -no record on care everywhere, pt does not recall    NSAIDS: none   Tobacco: none   Alcohol: none   Marijuana:none   Illicit drugs:none     FH GI malignancy: none     No history of adverse reaction to sedation  No MACY  No anticoagulants/antiplatelet  No pacemaker/defibrillator  No pain medications and/or sleep aides    Wt Readings from Last 6 Encounters:   10/02/24 220 lb (99.8 kg)   07/23/24 210 lb (95.3 kg)   05/21/24 204 lb 6.4 oz (92.7 kg)   04/19/24 212 lb (96.2 kg)   04/17/24 212 lb (96.2 kg)   04/08/24 213 lb (96.6 kg)           History, Medications, Allergies, ROS:      Past Medical History:    Atherosclerosis of coronary artery    Deaf, right    Diabetes (HCC)    Essential hypertension    Hyperlipidemia    Thyroid disease      Past Surgical History:   Procedure Laterality Date    Appendectomy  2012    Coronary stent placement      Rotator cuff repair Left       Family Hx:   Family History   Problem Relation Age of Onset    Colon Cancer Neg     Colon Polyps Neg       Social History:   Social History     Socioeconomic History    Marital status:    Tobacco Use    Smoking status: Former     Passive exposure: Never    Smokeless tobacco: Never   Vaping Use    Vaping status: Never Used   Substance and Sexual Activity    Alcohol use: Not Currently    Drug use: Never     Social Determinants of Health     Financial Resource Strain: Low Risk  (1/9/2024)    Financial Resource Strain     Difficulty of Paying Living Expenses: Not hard at all     Med Affordability: No   Food Insecurity: No Food Insecurity (12/30/2023)    Food Insecurity     Food Insecurity: Never true   Transportation Needs: No Transportation Needs (1/9/2024)    Transportation Needs     Lack of Transportation: No   Housing Stability: Low Risk  (12/30/2023)    Housing Stability     Housing Instability: No        Medications (Active prior to today's visit):  Current Outpatient Medications   Medication Sig Dispense Refill    polyethylene glycol, PEG 3350-KCl-NaBcb-NaCl-NaSulf, 236 g Oral Recon Soln Take 4,000 mL by mouth As Directed. Take 2,000 mL the night before your procedure and 2,000 mL the morning of your procedure. Take as directed by GI clinic. Okay to substitute for generic. 1 each 0    Continuous Glucose Sensor (FREESTYLE AJAY 3 SENSOR) Does not apply Misc Inject 1 each into the skin every 14 (fourteen) days. 6 each 3    insulin glargine (LANTUS SOLOSTAR) 100 UNIT/ML Subcutaneous Solution Pen-injector Inject 60 Units into the skin every morning. 60 mL 3     Insulin Lispro, 1 Unit Dial, 100 UNIT/ML Subcutaneous Solution Pen-injector Inject 25 Units into the skin 3 (three) times daily. Inject in the morning, at noon, and at bedtime 75 mL 0    Insulin Pen Needle (PEN NEEDLES) 32G X 4 MM Does not apply Misc Inject 1 Needle into the skin 4 (four) times daily. For use with insulins 400 each 3    furosemide 40 MG Oral Tab Take 1 tablet (40 mg total) by mouth 2 (two) times daily. 180 tablet 3    glipiZIDE 10 MG Oral Tab Take 1 tablet (10 mg total) by mouth 2 (two) times daily before meals. 90 tablet 1    LEVOTHYROXINE 125 MCG Oral Tab Take 1 tablet (125 mcg total) by mouth before breakfast. 90 tablet 0    EZETIMIBE 10 MG Oral Tab Take 1 tablet (10 mg total) by mouth nightly. 90 tablet 0    ISOSORBIDE MONONITRATE ER 30 MG Oral Tablet 24 Hr TAKE ONE TABLET BY MOUTH DAILY 90 tablet 0    Empagliflozin (JARDIANCE) 25 MG Oral Tab Take 25 mg by mouth daily. 90 tablet 2    rosuvastatin 20 MG Oral Tab Take 1 tablet (20 mg total) by mouth nightly. 90 tablet 3    aspirin 81 MG Oral Tab EC Take 1 tablet (81 mg total) by mouth daily. 90 tablet 3    amLODIPine 10 MG Oral Tab Take 1 tablet (10 mg total) by mouth daily. 90 tablet 3    losartan 25 MG Oral Tab Take 1 tablet (25 mg total) by mouth daily. 90 tablet 3       Allergies:  Allergies   Allergen Reactions    Shrimp OTHER (SEE COMMENTS)     Throat tightness       ROS:   CONSTITUTIONAL: negative for fevers, chills, sweats  EYES Negative for scleral icterus or redness, and diplopia  HEENT: Negative for hoarseness  RESPIRATORY: Negative for cough and severe shortness of breath  CARDIOVASCULAR: Negative for crushing sub-sternal chest pain  GASTROINTESTINAL: See HPI  GENITOURINARY: Negative for dysuria  MUSCULOSKELETAL: Negative for arthralgias and myalgias  SKIN: Negative for jaundice, rash or pruritus  NEUROLOGICAL: Negative for dizziness and headaches  BEHAVIOR/PSYCH: Negative for psychotic behavior      PHYSICAL EXAM:   Blood pressure  121/64, pulse 67, height 5' 11\" (1.803 m), weight 220 lb (99.8 kg).    GEN: Alert, no acute distress, well-nourished   HEENT: anicteric sclera, neck supple, trachea midline, MMM, no palpable or tender neck or supraclavicular lymph nodes  CV: RRR, the extremities are warm and well perfused   LUNGS: No increased work of breathing, CTAB  ABDOMEN: Soft, symmetrical, non-tender without distention or guarding. No scars or lesions. Aorta is without bruit or visible pulsation. Umbilicus is midline without herniation. Normoactive bowel sounds are present, No masses, hepatomegaly or splenomegaly noted.  MSK: No erythema, no warmth, no swelling of joints  SKIN: No jaundice, no erythema, no rashes, no lesions  HEMATOLOGIC: No bleeding, no bruising  NEURO: Alert and interactive, PLATA  PSYCH: appropriate mood & affect    Labs/Imaging:     Patient's labs and imaging were reviewed and discussed with patient today.    .  ASSESSMENT/PLAN:   Samy Hoskins is a 69 year old year-old male with active diagnoses including type 2 diabetes, dyslipidemia, hypertension, coronary artery disease, cochlear implant in place. Prior medical/surgical hx in note table. The patient presents for multiple GI symptoms    #constipation  #nausea  #appetite loss  #abdominal pain  -constipation the past 6 months. Reports bowel movement every 2-3 days. Stool is soft to firm, sometimes bristol type 1  -periumbilical abdominal pain, lower appetite and nausea the past 2 months. Does report 2-3 months started new insulin type and trulicity. He stopped both of these after a couple weeks because side effects of nausea, dizziness, headache     #GERD  -has frequent heartburn, not on antacids. Bothersome a few days every 1-2 weeks.     -prior colonoscopy in 2022 at [facility], will request records. Prior EGD maybe 2012?. Plan for double endoscopy to evaluate above symptoms. CT abdomen to rule out mass. Will test for H. Pylori, PPI trial if negative. Advised to try  miralax for constipation.     #obesity   #diabetes type 2  -uncontrolled. Followed by endocrine for management. Change to medications about 3 months ago. Continue to follow with specialist. Advised on antihyperglycemic agent modification in the context of endoscopic procedure preparation.  The patient has medical conditions including diabetes and/or obesity which impacts the clinical decision making for procedural planning.    Recommend:  --LONNY from [facility] from colonoscopy in 2022.     -go to lab for H. Pylori breath test    -call to schedule CT abdomen/pelvis     -for constipation take miralax (osmotic laxative). Start 1 capful every day.     -follow up 1 month after endoscopy       1. Schedule EGD & colonoscopy with URGENT pool endoscopist  Diagnosis: CRC screen, constipation, nausea, low appetite, GERD  Sedation: MAC  Prep: split dose golytely    2. MEDICATION CHANGES PRIOR TO PROCEDURE    *HOLD glipizide day before & day of procedure  *HOLD jardiance for 4 days prior to procedure    If you are prescribed insulin please reach out to your endocrine provider (or primary care provider) for insulin dosing prior to procedure. Failure to adjust insulin can result in procedure cancellations.    -Anti-platelets and anti-coagulants: N/A     Endoscopy risk/benefit discussion: I have thoroughly discussed the risks, benefits, and alternatives of endoscopic evaluation with the patient, who demonstrated understanding. This includes the potential risks of bleeding, infection, pain, anesthesia complications, and perforation, which may result in prolonged hospitalization or surgical intervention. All of the patient’s questions were addressed to their satisfaction. The patient has chosen to proceed with the endoscopic procedure, including any necessary interventions such as polypectomy, biopsy, control of bleeding.      Orders This Visit:  Orders Placed This Encounter   Procedures    Helicobacter Pylori Breath Test       Meds  This Visit:  Requested Prescriptions     Signed Prescriptions Disp Refills    polyethylene glycol, PEG 3350-KCl-NaBcb-NaCl-NaSulf, 236 g Oral Recon Soln 1 each 0     Sig: Take 4,000 mL by mouth As Directed. Take 2,000 mL the night before your procedure and 2,000 mL the morning of your procedure. Take as directed by GI clinic. Okay to substitute for generic.       Imaging & Referrals:  CT ABDOMEN+PELVIS(CONTRAST ONLY)(AIK=[id number])       MILY Bro    [facility] Gastroenterology  10/2/2024      The dictation was partially prepared using Dragon Medical voice recognition software. As a result, errors may occur. When identified, these errors have been corrected. While every attempt is made to correct errors during dictation, discrepancies may still exist.

## 2024-10-02 NOTE — PATIENT INSTRUCTIONS
-LONNY from [facility] from colonoscopy in 2022.     -go to lab for H. Pylori breath test    -call to schedule CT abdomen/pelvis #[phone]    -for constipation take miralax (osmotic laxative). Start 1 capful every day.     -follow up 1 month after endoscopy       1. Schedule EGD & colonoscopy with URGENT pool endoscopist  Diagnosis: CRC screen, constipation, nausea, low appetite, GERD  Sedation: MAC  Prep: split dose golytely    2. MEDICATION CHANGES PRIOR TO PROCEDURE    *HOLD glipizide day before & day of procedure  *HOLD jardiance for 4 days prior to procedure    If you are prescribed insulin please reach out to your endocrine provider (or primary care provider) for insulin dosing prior to procedure. Failure to adjust insulin can result in procedure cancellations.    3.  bowel prep from pharmacy   You can pick the bowel prep up now and store in a cool, dry place in your home until your scheduled bowel prep start date.    4. Read all bowel prep instructions carefully. Bowel prep instructions can also be found online at:  www.[facility].org/giprep     5. AVOID seeds, nuts, popcorn, raw fruits and vegetables for 5 days before procedure    6. If you start any NEW medication after your visit today, please notify us. Certain medications (like iron or weight loss medications) will need to be held before the procedure, or the procedure cannot be performed safely.

## 2024-10-02 NOTE — TELEPHONE ENCOUNTER
Prior Auth Team    Procedures on 10/18/2024    GI RN's     Please get clearance for Insulin.    Team Member Role and Specialty Contact Info   Kaleb Marie MD General (Internal Medicine) Phone: [phone]  Fax: [phone]     Thank you

## 2024-10-02 NOTE — TELEPHONE ENCOUNTER
Faxed Release of Information form to [facility]. Confirmation received. Sent for scanning.     S[facility] Dent Jr[address]  [place] [address]  Phone: [phone]  Fax: [phone]  
Yes

## 2024-10-04 ENCOUNTER — TELEPHONE (OUTPATIENT)
Facility: CLINIC | Age: 69
End: 2024-10-04

## 2024-10-04 DIAGNOSIS — K21.9 GASTROESOPHAGEAL REFLUX DISEASE, UNSPECIFIED WHETHER ESOPHAGITIS PRESENT: ICD-10-CM

## 2024-10-04 DIAGNOSIS — R11.0 NAUSEA: ICD-10-CM

## 2024-10-04 DIAGNOSIS — R63.0 APPETITE LOSS: Primary | ICD-10-CM

## 2024-10-04 LAB — H PYLORI BREATH TEST: NEGATIVE

## 2024-10-04 NOTE — TELEPHONE ENCOUNTER
Odell Kamara    Received a fax from [facility].    The patient had colonoscopy on 7/21/2023, operative notes and pathology report placed on your desk with the other lab results from 9/26/2023, 11/7/2023,11/15/2023 and 4/16/2024.    Placed on your desk for review.    Thank you

## 2024-10-08 DIAGNOSIS — K21.9 GASTROESOPHAGEAL REFLUX DISEASE, UNSPECIFIED WHETHER ESOPHAGITIS PRESENT: Primary | ICD-10-CM

## 2024-10-08 NOTE — TELEPHONE ENCOUNTER
Hi!  Please let patient know that he should not take the Trulicity from now till the colonoscopy.     The glipizide and Jardiance he should hold the day of the colonoscopy.     The day before the colonoscopy when he is taking clears, he should have 32 units of lantus and the day of the colonoscopy, he should have 22 units.     Also, it looks like his next appt is with Dr. Kumar. If that is what they would like, it is completely fine with me, I do not mind, she is very good. If it was made in error, please help them change. Thank you.

## 2024-10-08 NOTE — TELEPHONE ENCOUNTER
Dr. Muse,     Pt report compliance with:     Humalog 25 units TID  Lantus 55 units   Glipizide 10 mg   Jardiance 25 mg    Pt stopped Trulicity 1.5 mg at last visit due to GI SE.     Pt states he would like to keep appt with Dr. Kumar.

## 2024-10-08 NOTE — TELEPHONE ENCOUNTER
Hi!    Then in that case,     The day before the procedure, he can take the Lantus 55 units, but hold the novolog and continue the Jardiance and hold the glipizide as well.    The day of the procedure, he should hold novolog, glipizide, and Jardiance, and only take 40 units of the Lantus. He can resume the regular medications on the day after the colonoscopy. He can resume novolog with the meal after the colonoscopy. Thank you!

## 2024-10-09 NOTE — TELEPHONE ENCOUNTER
Called with  & spoke with patient regarding colonoscopy results. I do not recommend a repeat colonoscopy at this time. I did inform him to call to schedule the CT scan.     GI schedulers -   Please CANCEL the colonoscopy portion of procedure. But KEEP the EGD for 10/18. Thank you.    MILY Bro

## 2024-10-09 NOTE — TELEPHONE ENCOUNTER
Scheduled for:   EGD 91302  Location:  [facility] ([facility])     Provider: Shaun Malave MD     Date:   10/18/2024    Friday  Time:   8:15 AM (Patient made aware will receive phone call the day before with an arrival time)     Sedation:  MAC  Prep:  Split GoLytely     Diagnosis with codes:      ICD-10-CM             3. Appetite loss  R63.0   4. Gastroesophageal reflux disease, unspecified whether esophagitis present  K21.9   5. Nausea  R11.0         Meds/Allergies Reconciled?:    Provider Reviewed   Was patient informed to call insurance with codes (Y/N):  Yes  Referral sent?: Referral was sent at the time of electronic surgical scheduling.  [facility] or [facility] notified?: I sent an electronic request to ENDO Scheduling and received a confirmation today.      Medication Orders:  Patient is aware to NOT take iron pills, herbal meds and diet supplements for 7 days before exam. Also to NOT take any form of alcohol, recreational drugs and any forms of ED meds 24 hours before exam.      *HOLD glipizide day before & day of procedure  *HOLD jardiance for 4 days prior to procedure     If you are prescribed insulin please reach out to your endocrine provider (or primary care provider) for insulin dosing prior to procedure. Failure to adjust insulin can result in procedure cancellations.     Misc Orders:  N/A   Further instructions given by staff:  I Provided prep instructions to patient and reviewed date, time and location. Patient verbalized that  He / His understood and is aware to call with any questions.  Patient was informed about the new cancellation policy for He / His procedure. Patient was also given copy of the cancellation policy at the time of the appointment and verbalized understanding.

## 2024-10-09 NOTE — TELEPHONE ENCOUNTER
PPD-    Patient will only be having EGD 01893 with Dr Malave at [facility] on 10/18/2024, dx codes:    3. Appetite loss  R63.0   4. Gastroesophageal reflux disease, unspecified whether esophagitis present  K21.9   5. Nausea  R11.0       Thanks!

## 2024-10-09 NOTE — TELEPHONE ENCOUNTER
Record reviewed & placed in scan bin.    Date: 7/21/23  Location: [facility]  Procedure: colonoscopy   Finding:  -mild diverticulosis  -3mm sigmoid colon polyp  Biopsy: sigmoid colon polyp: tubular adenoma

## 2024-10-14 ENCOUNTER — TELEPHONE (OUTPATIENT)
Dept: INTERNAL MEDICINE CLINIC | Facility: CLINIC | Age: 69
End: 2024-10-14

## 2024-10-14 DIAGNOSIS — Z79.4 TYPE 2 DIABETES MELLITUS WITH HYPERGLYCEMIA, WITH LONG-TERM CURRENT USE OF INSULIN (HCC): Primary | ICD-10-CM

## 2024-10-14 DIAGNOSIS — E11.65 TYPE 2 DIABETES MELLITUS WITH HYPERGLYCEMIA, WITH LONG-TERM CURRENT USE OF INSULIN (HCC): Primary | ICD-10-CM

## 2024-10-14 RX ORDER — ROSUVASTATIN CALCIUM 20 MG/1
20 TABLET, COATED ORAL NIGHTLY
Qty: 90 TABLET | Refills: 3 | Status: SHIPPED | OUTPATIENT
Start: 2024-10-14

## 2024-10-14 NOTE — TELEPHONE ENCOUNTER
Per his wife on release form, the pharmacy told her that even though he has refills on the Freestyle Jessica #3, the are on back order because everyone want's the smaller one. Pharmacist advised her that she needs to ask us to prescribe either #1 or Freestyle #2    Refill passed per [facility] protocol.  Diabetic Supplies  Protocol Criteria:  Appointment scheduled in past 12 months or the next 3 months        Dr. Marie, please review the pended script and send if approve, thanks

## 2024-10-14 NOTE — TELEPHONE ENCOUNTER
Refill passed per [facility] protocol.  Requested Prescriptions   Pending Prescriptions Disp Refills    ROSUVASTATIN 20 MG Oral Tab [Pharmacy Med Name: Rosuvastatin Calcium 20 Mg Tab Nort] 90 tablet 0     Sig: TAKE ONE TABLET BY MOUTH nightly       Cholesterol Medication Protocol Passed - 10/14/2024  9:23 AM        Passed - ALT < 80     Lab Results   Component Value Date    ALT 32 07/24/2024             Passed - ALT resulted within past year        Passed - Lipid panel within past 12 months     Lab Results   Component Value Date    CHOLEST 136 07/24/2024    TRIG 97 07/24/2024    HDL 46 07/24/2024    LDL 72 07/24/2024    VLDL 15 07/24/2024    NONHDLC 90 07/24/2024             Passed - In person appointment or virtual visit in the past 12 mos or appointment in next 3 mos     Recent Outpatient Visits              1 week ago Colon cancer screening    [facility] Anh Oates APRN    Office Visit    2 months ago Type 2 diabetes mellitus without complication, with long-term current use of insulin ([facility])    [facility] Kaleb Marie MD    Office Visit    4 months ago Type 2 diabetes mellitus with hyperglycemia, with long-term current use of insulin ([facility])    [facility] Mari Muse MD    Office Visit    5 months ago Encounter for examination following treatment at hospital    [facility] Kaleb Marie MD    Office Visit    6 months ago Type 2 diabetes mellitus with hyperglycemia, with long-term current use of insulin ([facility])    [facility] Mari Bowman MD    Office Visit          Future Appointments         Provider Department Appt Notes    In 3 days [facility] CT RM1 [facility]     In 4 days CECILLE ANTOINE [facility] BRANDO  with MAC @ EM    In 4 days Colette Kumar MD [facility] Diabetes - transferring care from  7/25/2024 jmb    In 2 weeks Kaleb Marie MD [facility] 3 month fu    In 2 months Marilou Gudino DPM [facility] dm foot check, appt sched by pt's daughter, policy informed                       Recent Outpatient Visits              1 week ago Colon cancer screening    [facility] Anh Oates APRN    Office Visit    2 months ago Type 2 diabetes mellitus without complication, with long-term current use of insulin ([facility])    [facility] Kaleb Marie MD    Office Visit    4 months ago Type 2 diabetes mellitus with hyperglycemia, with long-term current use of insulin ([facility])    [facility] Mari Muse MD    Office Visit    5 months ago Encounter for examination following treatment at [facility] Kaleb Marie MD    Office Visit    6 months ago Type 2 diabetes mellitus with hyperglycemia, with long-term current use of insulin ([facility])    [facility] Sheeba Perez Sudha K, MD    Office Visit          Future Appointments         Provider Department Appt Notes    In 3 days [facility] CT [address] CT [facility]     In 4 days CECILLE ANTOINE [facility] EGD with MAC @ EM    In 4 days Colette Kumar MD [facility] Diabetes - transferring care from  7/25/2024 jmb    In 2 weeks Kaleb Marie MD [facility] 3 month fu    In 2 months Marilou Gudino DPM [facility] dm  foot check, appt sched by pt's daughter, policy informed

## 2024-10-14 NOTE — TELEPHONE ENCOUNTER
Spouse, states that the Freestyle Jessica 3 is being phased out. Spouse,  states that the patient only has 4 days left of medication. Spouse, would like to know what other medication the doctor recommends for the patient to take and would a call back.

## 2024-10-15 NOTE — PAT NURSING NOTE
Patient instructed to hold jardiance 4 days prior to procedure per the Pre-surgical testing policy.      Instructed patient to follow up with endocrinologist regarding insulin and other diabetic meds.

## 2024-10-17 ENCOUNTER — HOSPITAL ENCOUNTER (OUTPATIENT)
Dept: CT IMAGING | Facility: HOSPITAL | Age: 69
Discharge: HOME | End: 2024-10-17
Payer: MEDICAID

## 2024-10-17 DIAGNOSIS — K59.00 CONSTIPATION, UNSPECIFIED CONSTIPATION TYPE: ICD-10-CM

## 2024-10-17 DIAGNOSIS — R10.33 PERIUMBILICAL ABDOMINAL PAIN: ICD-10-CM

## 2024-10-17 DIAGNOSIS — R63.0 APPETITE LOSS: ICD-10-CM

## 2024-10-17 PROCEDURE — 74177 CT ABD & PELVIS W/CONTRAST: CPT

## 2024-10-17 PROCEDURE — 82565 ASSAY OF CREATININE: CPT

## 2024-10-18 ENCOUNTER — ANESTHESIA (OUTPATIENT)
Dept: ENDOSCOPY | Facility: HOSPITAL | Age: 69
End: 2024-10-18
Payer: MEDICAID

## 2024-10-18 ENCOUNTER — HOSPITAL ENCOUNTER (OUTPATIENT)
Facility: HOSPITAL | Age: 69
Setting detail: HOSPITAL OUTPATIENT SURGERY
Discharge: HOME | End: 2024-10-18
Attending: STUDENT IN AN ORGANIZED HEALTH CARE EDUCATION/TRAINING PROGRAM | Admitting: STUDENT IN AN ORGANIZED HEALTH CARE EDUCATION/TRAINING PROGRAM
Payer: MEDICAID

## 2024-10-18 ENCOUNTER — ANESTHESIA EVENT (OUTPATIENT)
Dept: ENDOSCOPY | Facility: HOSPITAL | Age: 69
End: 2024-10-18
Payer: MEDICAID

## 2024-10-18 ENCOUNTER — TELEPHONE (OUTPATIENT)
Facility: CLINIC | Age: 69
End: 2024-10-18

## 2024-10-18 VITALS
HEART RATE: 52 BPM | HEIGHT: 71 IN | WEIGHT: 214 LBS | OXYGEN SATURATION: 95 % | RESPIRATION RATE: 10 BRPM | TEMPERATURE: 97 F | DIASTOLIC BLOOD PRESSURE: 69 MMHG | SYSTOLIC BLOOD PRESSURE: 117 MMHG | BODY MASS INDEX: 29.96 KG/M2

## 2024-10-18 DIAGNOSIS — R63.0 APPETITE LOSS: ICD-10-CM

## 2024-10-18 DIAGNOSIS — K21.9 GASTROESOPHAGEAL REFLUX DISEASE, UNSPECIFIED WHETHER ESOPHAGITIS PRESENT: ICD-10-CM

## 2024-10-18 DIAGNOSIS — R11.0 NAUSEA: ICD-10-CM

## 2024-10-18 LAB
CREAT BLD-MCNC: 1 MG/DL
EGFRCR SERPLBLD CKD-EPI 2021: 82 ML/MIN/1.73M2 (ref 60–?)
GLUCOSE BLDC GLUCOMTR-MCNC: 189 MG/DL (ref 70–99)

## 2024-10-18 PROCEDURE — 0DB78ZX EXCISION OF STOMACH, PYLORUS, VIA NATURAL OR ARTIFICIAL OPENING ENDOSCOPIC, DIAGNOSTIC: ICD-10-PCS | Performed by: STUDENT IN AN ORGANIZED HEALTH CARE EDUCATION/TRAINING PROGRAM

## 2024-10-18 PROCEDURE — 43239 EGD BIOPSY SINGLE/MULTIPLE: CPT | Performed by: STUDENT IN AN ORGANIZED HEALTH CARE EDUCATION/TRAINING PROGRAM

## 2024-10-18 PROCEDURE — 0DB68ZX EXCISION OF STOMACH, VIA NATURAL OR ARTIFICIAL OPENING ENDOSCOPIC, DIAGNOSTIC: ICD-10-PCS | Performed by: STUDENT IN AN ORGANIZED HEALTH CARE EDUCATION/TRAINING PROGRAM

## 2024-10-18 RX ORDER — MORPHINE SULFATE 4 MG/ML
2 INJECTION, SOLUTION INTRAMUSCULAR; INTRAVENOUS EVERY 10 MIN PRN
OUTPATIENT
Start: 2024-10-18

## 2024-10-18 RX ORDER — NICOTINE POLACRILEX 4 MG
15 LOZENGE BUCCAL
OUTPATIENT
Start: 2024-10-18

## 2024-10-18 RX ORDER — SODIUM CHLORIDE, SODIUM LACTATE, POTASSIUM CHLORIDE, CALCIUM CHLORIDE 600; 310; 30; 20 MG/100ML; MG/100ML; MG/100ML; MG/100ML
INJECTION, SOLUTION INTRAVENOUS CONTINUOUS
Status: DISCONTINUED | OUTPATIENT
Start: 2024-10-18 | End: 2024-10-18

## 2024-10-18 RX ORDER — SODIUM CHLORIDE, SODIUM LACTATE, POTASSIUM CHLORIDE, CALCIUM CHLORIDE 600; 310; 30; 20 MG/100ML; MG/100ML; MG/100ML; MG/100ML
INJECTION, SOLUTION INTRAVENOUS CONTINUOUS
OUTPATIENT
Start: 2024-10-18

## 2024-10-18 RX ORDER — HYDROMORPHONE HYDROCHLORIDE 1 MG/ML
0.6 INJECTION, SOLUTION INTRAMUSCULAR; INTRAVENOUS; SUBCUTANEOUS EVERY 5 MIN PRN
OUTPATIENT
Start: 2024-10-18

## 2024-10-18 RX ORDER — HYDROMORPHONE HYDROCHLORIDE 1 MG/ML
0.2 INJECTION, SOLUTION INTRAMUSCULAR; INTRAVENOUS; SUBCUTANEOUS EVERY 5 MIN PRN
OUTPATIENT
Start: 2024-10-18

## 2024-10-18 RX ORDER — KETAMINE HYDROCHLORIDE 50 MG/ML
INJECTION, SOLUTION INTRAMUSCULAR; INTRAVENOUS AS NEEDED
Status: DISCONTINUED | OUTPATIENT
Start: 2024-10-18 | End: 2024-10-18 | Stop reason: SURG

## 2024-10-18 RX ORDER — HYDROMORPHONE HYDROCHLORIDE 1 MG/ML
0.4 INJECTION, SOLUTION INTRAMUSCULAR; INTRAVENOUS; SUBCUTANEOUS EVERY 5 MIN PRN
OUTPATIENT
Start: 2024-10-18

## 2024-10-18 RX ORDER — MORPHINE SULFATE 10 MG/ML
6 INJECTION, SOLUTION INTRAMUSCULAR; INTRAVENOUS EVERY 10 MIN PRN
OUTPATIENT
Start: 2024-10-18

## 2024-10-18 RX ORDER — DEXTROSE MONOHYDRATE 25 G/50ML
50 INJECTION, SOLUTION INTRAVENOUS
OUTPATIENT
Start: 2024-10-18

## 2024-10-18 RX ORDER — NICOTINE POLACRILEX 4 MG
30 LOZENGE BUCCAL
OUTPATIENT
Start: 2024-10-18

## 2024-10-18 RX ORDER — MORPHINE SULFATE 4 MG/ML
4 INJECTION, SOLUTION INTRAMUSCULAR; INTRAVENOUS EVERY 10 MIN PRN
OUTPATIENT
Start: 2024-10-18

## 2024-10-18 RX ORDER — NALOXONE HYDROCHLORIDE 0.4 MG/ML
0.08 INJECTION, SOLUTION INTRAMUSCULAR; INTRAVENOUS; SUBCUTANEOUS AS NEEDED
OUTPATIENT
Start: 2024-10-18 | End: 2024-10-18

## 2024-10-18 RX ADMIN — SODIUM CHLORIDE, SODIUM LACTATE, POTASSIUM CHLORIDE, CALCIUM CHLORIDE: 600; 310; 30; 20 INJECTION, SOLUTION INTRAVENOUS at 09:04:00

## 2024-10-18 RX ADMIN — KETAMINE HYDROCHLORIDE 20 MG: 50 INJECTION, SOLUTION INTRAMUSCULAR; INTRAVENOUS at 08:59:00

## 2024-10-18 NOTE — ANESTHESIA POSTPROCEDURE EVALUATION
Patient: Samy Hoskins    Procedure Summary       Date: 10/18/24 Room / Location: [facility] ENDOSCOPY 04 / [facility] ENDOSCOPY    Anesthesia Start: 0854 Anesthesia Stop: 0919    Procedure: ESOPHAGOGASTRODUODENOSCOPY Diagnosis:       Appetite loss      Gastroesophageal reflux disease, unspecified whether esophagitis present      Nausea      (gastritis, gastric polyp)    Surgeons: Shaun Malave MD Anesthesiologist:     Anesthesia Type: MAC ASA Status: 3            Anesthesia Type: No value filed.    Vitals Value Taken Time   /65 10/18/24 0918   Temp 97 °F (36.1 °C) 10/18/24 0918   Pulse 52 10/18/24 0918   Resp 16 10/18/24 0918   SpO2 99 % 10/18/24 0918       [facility] AN Post Evaluation:   Patient Evaluated in PACU  Patient Participation: complete - patient participated  Level of Consciousness: awake and awake and alert  Pain Score: 0  Pain Management: adequate  Airway Patency:patent  Yes    Nausea/Vomiting: none  Cardiovascular Status: acceptable  Respiratory Status: acceptable  Postoperative Hydration acceptable      Aniya Stratton CRNA  10/18/2024 9:19 AM

## 2024-10-18 NOTE — ANESTHESIA PREPROCEDURE EVALUATION
Anesthesia PreOp Note    HPI:     Samy Hoskins is a 69 year old male who presents for preoperative consultation requested by: Shaun Malave MD    Date of Surgery: 10/18/2024    Procedure(s):  ESOPHAGOGASTRODUODENOSCOPY  Indication: /Appetite loss /Gastroesophageal reflux disease, unspecified whether esophagitis present /Nausea    Relevant Problems   No relevant active problems       NPO:  Last Liquid Consumption Date: 10/17/24  Last Liquid Consumption Time: 2000  Last Solid Consumption Date: 10/17/24  Last Solid Consumption Time: 2000  Last Liquid Consumption Date: 10/17/24          History Review:  Patient Active Problem List    Diagnosis Date Noted    Type 2 diabetes mellitus with hyperglycemia, with long-term current use of insulin ([facility]) 04/08/2024    Dyslipidemia 04/08/2024    Primary hypertension 02/19/2024    Coronary artery disease involving native coronary artery of native heart without angina pectoris 02/19/2024    Type 2 diabetes mellitus without complication, with long-term current use of insulin ([facility]) 02/19/2024    Mixed hyperlipidemia 02/19/2024    Cataracta 02/19/2024    Hyponatremia 12/30/2023    Hyperkalemia 12/30/2023    Azotemia 12/30/2023    Hyperglycemia 12/30/2023    Cellulitis of head or scalp 12/30/2023    Cochlear implant in place 12/30/2023       Past Medical History:    Atherosclerosis of coronary artery    Deaf, right    Diabetes (HCC)    Disorder of thyroid    Essential hypertension    High blood pressure    High cholesterol    Hyperlipidemia    Thyroid disease       Past Surgical History:   Procedure Laterality Date    Appendectomy  2012    Coronary stent placement      Rotator cuff repair Left        Prescriptions Prior to Admission[1]  Current Medications and Prescriptions Ordered in Epic[2]    Allergies[3]    Family History   Problem Relation Age of Onset    Colon Cancer Neg     Colon Polyps Neg      Social History     Socioeconomic History    Marital status:     Tobacco Use    Smoking status: Former     Passive exposure: Never    Smokeless tobacco: Never   Vaping Use    Vaping status: Never Used   Substance and Sexual Activity    Alcohol use: Not Currently    Drug use: Never       Available pre-op labs reviewed.  Lab Results   Component Value Date    WBC 8.5 07/24/2024    RBC 5.23 07/24/2024    HGB 14.4 07/24/2024    HCT 43.8 07/24/2024    MCV 83.7 07/24/2024    MCH 27.5 07/24/2024    MCHC 32.9 07/24/2024    RDW 14.6 07/24/2024    .0 07/24/2024     Lab Results   Component Value Date     07/24/2024    K 4.1 07/24/2024     07/24/2024    CO2 25.0 07/24/2024    BUN 19 07/24/2024    CREATSERUM 0.89 07/24/2024    GLU 97 07/24/2024    CA 9.8 07/24/2024          Vital Signs:  Body mass index is 29.85 kg/m².   height is 1.803 m (5' 11\") and weight is 97.1 kg (214 lb). His blood pressure is 144/77 and his pulse is 55. His respiration is 12 and oxygen saturation is 98%.   Vitals:    10/15/24 1711 10/18/24 0834   BP:  144/77   Pulse:  55   Resp:  12   SpO2:  98%   Weight: 97.1 kg (214 lb)    Height: 1.803 m (5' 11\")         Anesthesia Evaluation      Airway   Mallampati: II  TM distance: >3 FB  Neck ROM: full  Dental - Dentition appears grossly intact     Pulmonary     breath sounds clear to auscultation  Cardiovascular   (+) hypertension, CAD    Rhythm: regular  Rate: normal    Neuro/Psych      GI/Hepatic/Renal      Endo/Other    (+) diabetes mellitus  Abdominal     Abdomen: soft.                 Anesthesia Plan:   ASA:  3  Plan:   MAC      I have informed Samy Hoskins and/or legal guardian or family member of the nature of the anesthetic plan, benefits, risks including possible dental damage if relevant, major complications, and any alternative forms of anesthetic management.   All of the patient's questions were answered to the best of my ability. The patient desires the anesthetic management as planned.  Aniya Stratton CRNA  10/18/2024 8:47 AM  Present on  Admission:  **None**           [1]   Medications Prior to Admission   Medication Sig Dispense Refill Last Dose/Taking    rosuvastatin 20 MG Oral Tab Take 1 tablet (20 mg total) by mouth nightly. 90 tablet 3 10/17/2024    omeprazole 20 MG Oral Capsule Delayed Release Take 1 capsule (20 mg total) by mouth every morning. 90 capsule 3 10/17/2024    insulin glargine (LANTUS SOLOSTAR) 100 UNIT/ML Subcutaneous Solution Pen-injector Inject 60 Units into the skin every morning. 60 mL 3 10/16/2024    Insulin Lispro, 1 Unit Dial, 100 UNIT/ML Subcutaneous Solution Pen-injector Inject 25 Units into the skin 3 (three) times daily. Inject in the morning, at noon, and at bedtime 75 mL 0 10/16/2024    furosemide 40 MG Oral Tab Take 1 tablet (40 mg total) by mouth 2 (two) times daily. 180 tablet 3 10/17/2024    glipiZIDE 10 MG Oral Tab Take 1 tablet (10 mg total) by mouth 2 (two) times daily before meals. 90 tablet 1 10/17/2024    LEVOTHYROXINE 125 MCG Oral Tab Take 1 tablet (125 mcg total) by mouth before breakfast. 90 tablet 0 10/17/2024    EZETIMIBE 10 MG Oral Tab Take 1 tablet (10 mg total) by mouth nightly. 90 tablet 0 10/17/2024    ISOSORBIDE MONONITRATE ER 30 MG Oral Tablet 24 Hr TAKE ONE TABLET BY MOUTH DAILY 90 tablet 0 10/17/2024    Empagliflozin (JARDIANCE) 25 MG Oral Tab Take 25 mg by mouth daily. 90 tablet 2 10/14/2024    aspirin 81 MG Oral Tab EC Take 1 tablet (81 mg total) by mouth daily. 90 tablet 3 10/17/2024    amLODIPine 10 MG Oral Tab Take 1 tablet (10 mg total) by mouth daily. 90 tablet 3 10/17/2024    losartan 25 MG Oral Tab Take 1 tablet (25 mg total) by mouth daily. 90 tablet 3 10/17/2024    Continuous Glucose  (FREESTYLE AJAY 2 READER) Does not apply Device Inject one each into the skin every fourteen days 6 each 3     polyethylene glycol, PEG 3350-KCl-NaBcb-NaCl-NaSulf, 236 g Oral Recon Soln Take 4,000 mL by mouth As Directed. Take 2,000 mL the night before your procedure and 2,000 mL the morning of  your procedure. Take as directed by GI clinic. Okay to substitute for generic. 1 each 0     Continuous Glucose Sensor (FREESTYLE AJAY 3 SENSOR) Does not apply Misc Inject 1 each into the skin every 14 (fourteen) days. 6 each 3     Insulin Pen Needle (PEN NEEDLES) 32G X 4 MM Does not apply Misc Inject 1 Needle into the skin 4 (four) times daily. For use with insulins 400 each 3     [] valACYclovir 1 G Oral Tab Take 1 tablet (1,000 mg total) by mouth every 8 (eight) hours for 7 days. 21 tablet 0    [2]   Current Facility-Administered Medications Ordered in Epic   Medication Dose Route Frequency Provider Last Rate Last Admin    lactated ringers infusion   Intravenous Continuous Shaun Malave MD 20 mL/hr at 10/18/24 0842 New Bag at 10/18/24 0842     No current [facility]-ordered outpatient medications on file.   [3]   Allergies  Allergen Reactions    Shrimp OTHER (SEE COMMENTS)     Throat tightness

## 2024-10-18 NOTE — H&P
History & Physical Examination    Patient Name: Samy Hoskins  MRN: [id number]  [facility]: [id number]  YOB: 1955    Diagnosis: GERD, loss of appetite. EGD today.    Prescriptions Prior to Admission[1]  Current Facility-Administered Medications   Medication Dose Route Frequency    lactated ringers infusion   Intravenous Continuous       Allergies: Allergies[2]    Past Medical History:    Atherosclerosis of coronary artery    Deaf, right    Diabetes (HCC)    Disorder of thyroid    Essential hypertension    High blood pressure    High cholesterol    Hyperlipidemia    Thyroid disease     Past Surgical History:   Procedure Laterality Date    Appendectomy  2012    Coronary stent placement      Rotator cuff repair Left      Family History   Problem Relation Age of Onset    Colon Cancer Neg     Colon Polyps Neg      Social History     Tobacco Use    Smoking status: Former     Passive exposure: Never    Smokeless tobacco: Never   Substance Use Topics    Alcohol use: Not Currently       SYSTEM Check if Review is Normal Check if Physical Exam is Normal If not normal, please explain:   HEENT [X ] [ X]    NECK  [X ] [ X]    HEART [X ] [ X]    LUNGS [X ] [ X]    ABDOMEN [X ] [ X]    EXTREMITIES [X ] [ X]    OTHER        I have discussed the risks and benefits and alternatives of the procedure with the patient/family.  They understand and agree to proceed with plan of care.   I have reviewed the History and Physical done within the last 30 days.  Any changes noted above.    Shaun Malave MD  [facility]                   [1]   Medications Prior to Admission   Medication Sig Dispense Refill Last Dose/Taking    rosuvastatin 20 MG Oral Tab Take 1 tablet (20 mg total) by mouth nightly. 90 tablet 3 Taking    omeprazole 20 MG Oral Capsule Delayed Release Take 1 capsule (20 mg total) by mouth every morning. 90 capsule 3 Taking    insulin glargine (LANTUS SOLOSTAR) 100 UNIT/ML Subcutaneous Solution  Pen-injector Inject 60 Units into the skin every morning. 60 mL 3 Taking    Insulin Lispro, 1 Unit Dial, 100 UNIT/ML Subcutaneous Solution Pen-injector Inject 25 Units into the skin 3 (three) times daily. Inject in the morning, at noon, and at bedtime 75 mL 0 Taking    furosemide 40 MG Oral Tab Take 1 tablet (40 mg total) by mouth 2 (two) times daily. 180 tablet 3 Taking    glipiZIDE 10 MG Oral Tab Take 1 tablet (10 mg total) by mouth 2 (two) times daily before meals. 90 tablet 1 Taking    LEVOTHYROXINE 125 MCG Oral Tab Take 1 tablet (125 mcg total) by mouth before breakfast. 90 tablet 0 Taking    EZETIMIBE 10 MG Oral Tab Take 1 tablet (10 mg total) by mouth nightly. 90 tablet 0 Taking    ISOSORBIDE MONONITRATE ER 30 MG Oral Tablet 24 Hr TAKE ONE TABLET BY MOUTH DAILY 90 tablet 0 Taking    Empagliflozin (JARDIANCE) 25 MG Oral Tab Take 25 mg by mouth daily. 90 tablet 2 10/14/2024    aspirin 81 MG Oral Tab EC Take 1 tablet (81 mg total) by mouth daily. 90 tablet 3 Taking    amLODIPine 10 MG Oral Tab Take 1 tablet (10 mg total) by mouth daily. 90 tablet 3 Taking    losartan 25 MG Oral Tab Take 1 tablet (25 mg total) by mouth daily. 90 tablet 3 Taking    Continuous Glucose  (FREESTYLE AJAY 2 READER) Does not apply Device Inject one each into the skin every fourteen days 6 each 3     polyethylene glycol, PEG 3350-KCl-NaBcb-NaCl-NaSulf, 236 g Oral Recon Soln Take 4,000 mL by mouth As Directed. Take 2,000 mL the night before your procedure and 2,000 mL the morning of your procedure. Take as directed by GI clinic. Okay to substitute for generic. 1 each 0     Continuous Glucose Sensor (FREESTYLE AJAY 3 SENSOR) Does not apply Misc Inject 1 each into the skin every 14 (fourteen) days. 6 each 3     Insulin Pen Needle (PEN NEEDLES) 32G X 4 MM Does not apply Misc Inject 1 Needle into the skin 4 (four) times daily. For use with insulins 400 each 3     [] valACYclovir 1 G Oral Tab Take 1 tablet (1,000 mg total) by  mouth every 8 (eight) hours for 7 days. 21 tablet 0    [2]   Allergies  Allergen Reactions    Shrimp OTHER (SEE COMMENTS)     Throat tightness

## 2024-10-18 NOTE — DISCHARGE INSTRUCTIONS
Home Care Instructions for Gastroscopy with Sedation    Diet:  - Resume your regular diet as tolerated unless otherwise instructed.  - Start with light meals to minimize bloating.  - Do not drink alcohol today.    Medication:  - If you have questions about resuming your normal medications, please contact your Primary Care Physician.    Activities:  - Take it easy today. Do not return to work today.  - Do not drive today.  - Do not operate any machinery today (including kitchen equipment).    Gastroscopy:  - You may have a sore throat for 2-3 days following the exam. This is normal. Gargling with warm salt water (1/2 tsp salt to 1 glass warm water) or using throat lozenges will help.  - If you experience any sharp pain in your neck, abdomen or chest, vomiting of blood, oral temperature over 100 degrees Fahrenheit, light-headedness or dizziness, or any other problems, contact your doctor.    **If unable to reach your doctor, please go to the [facility] Emergency Room**    - Your referring physician will receive a full report of your examination.  - If you do not hear from your doctor's office within two weeks of your biopsy, please call them for your results.    You may be able to see your laboratory results in [employer] between 4 and 7 business days.  In some cases, your physician may not have viewed the results before they are released to [employer].  If you have questions regarding your results contact the physician who ordered the test/exam by phone or via [employer] by choosing \"Ask a Medical Question.\"

## 2024-10-18 NOTE — OPERATIVE REPORT
ESOPHAGOGASTRODUODENOSCOPY (EGD) REPORT    Samy Hoskins     1955 Age 69 year old   PCP Kaleb Marie MD Endoscopist Shaun Malave MD       Date of procedure: 10/18/24    Procedure: EGD w/ biopsies    Pre-operative diagnosis: bloating, abdominal discomfort    Post-operative diagnosis: see impression    Medications: MAC    Complications: none    Procedure:  Informed consent was obtained from the patient after the risks of the procedure were discussed, including but not limited to bleeding, perforation, aspiration, infection, or possibility of a missed lesion. After discussions of the risks/benefits and alternatives to this procedure, as well as the planned sedation, the patient was placed in the left lateral decubitus position and begun on continuous blood pressure pulse oximetry and EKG monitoring and this was maintained throughout the procedure. Once an adequate level of sedation was obtained a bite block was placed. Then the lubricated tip of the [id number] diagnostic video upper endoscope was inserted and advanced using direct visualization into the posterior pharynx and ultimately into the esophagus. The scope was then advanced through the stomach and to the second portion of the duodenum.    Complications: None    Findings:      1. Esophagus: The squamocolumnar junction was noted at 40 cm and appeared regular. The diaphragmatic pinch was noted noted at 41 cm from the incisors. 1 cm hiatal hernia. The esophageal mucosa appeared healthy and normal. There was no endoscopic findings of esophagitis, stricture or Montanez's esophagus.    2. Stomach: The stomach distended normally. Normal rugal folds were seen. The pylorus was patent. Retroflexion revealed a normal fundus. The gastric mucosa appeared diffusely erythematous. Biopsies were taken with a cold forceps from the antrum, incisura and body for histology. There were polyps scattered in the fundus, body and antrum. Several of these were biopsies  with a cold forceps for histology. They measured less than 1 cm in size.    3. Duodenum: In the duodenal sweep, there was a soft, round mobile lesion with normal overlying mucosa. The lesion measured 15 mm in size. The gastroscope was able to get beyond this lesion and the 2nd portion appeared normal.    Impression:  -Esophagus: small hiatal hernia.  -Stomach: Gastritis. Biopsied. Gastric polyps. Biopsied.  -Duodenum: Mobile lesion in the sweep, likely submucosal origin. Seems to be a lipoma.    Recommend:  1. Await pathology.  2. Will review CT A/P.    >>>If tissue was sampled/removed and you have not received your pathology results either by phone or letter within 2 weeks, please call our office at [phone].    Specimens:  Gastric, gastric polyps    Blood loss: <1 ml

## 2024-10-19 ENCOUNTER — TELEPHONE (OUTPATIENT)
Facility: CLINIC | Age: 69
End: 2024-10-19

## 2024-10-19 DIAGNOSIS — R93.5 ABNORMAL CT OF THE ABDOMEN: Primary | ICD-10-CM

## 2024-10-21 ENCOUNTER — TELEPHONE (OUTPATIENT)
Facility: CLINIC | Age: 69
End: 2024-10-21

## 2024-10-21 NOTE — TELEPHONE ENCOUNTER
Message  Received: 2 days ago  Anh Oates APRN [facility]  Attempted to call patient with . Left voicemail. [employer] message sent  GI RNs - please try to reach patient on Monday.  -inform him colitis on CT. Needs to complete fecal calprotectin now and again in 6 weeks.  -ask if he is currently having abdominal pain, diarrhea or red blood in stool  Thank you.

## 2024-10-21 NOTE — TELEPHONE ENCOUNTER
Called and spoke the patient's spouse, Nurys (HIPAA authorized). She verified his date of birth and name.    RAÚL's message relayed.    Instructed Nurys to have the patient  the stool kit from any [facility] lab and bring back with the stool specimen.    She agreed and verbalized understanding. She also knows that the patient needs to repeat stool test after 6 weeks.

## 2024-10-21 NOTE — TELEPHONE ENCOUNTER
Pt did not have colonoscopy. EGD only on 10/18/2024    Anh Oates APRN       10/9/24  7:21 AM  Note     Record reviewed & placed in scan bin.     Date: 7/21/23  Location: [facility]  Procedure: colonoscopy   Finding:  -mild diverticulosis  -3mm sigmoid colon polyp  Biopsy: sigmoid colon polyp: tubular adenoma

## 2024-10-22 ENCOUNTER — LAB ENCOUNTER (OUTPATIENT)
Dept: LAB | Facility: HOSPITAL | Age: 69
End: 2024-10-22
Payer: MEDICAID

## 2024-10-22 DIAGNOSIS — R93.5 ABNORMAL CT OF THE ABDOMEN: ICD-10-CM

## 2024-10-22 PROCEDURE — 83993 ASSAY FOR CALPROTECTIN FECAL: CPT

## 2024-10-24 LAB — CALPROTECTIN STL-MCNT: 444 ΜG/G (ref ?–50)

## 2024-10-25 ENCOUNTER — TELEPHONE (OUTPATIENT)
Facility: CLINIC | Age: 69
End: 2024-10-25

## 2024-10-25 DIAGNOSIS — A04.8 H. PYLORI INFECTION: Primary | ICD-10-CM

## 2024-10-25 RX ORDER — AMOXICILLIN 500 MG/1
1000 TABLET, FILM COATED ORAL 2 TIMES DAILY
Qty: 56 TABLET | Refills: 0 | Status: SHIPPED | OUTPATIENT
Start: 2024-10-25 | End: 2024-11-08

## 2024-10-25 RX ORDER — CLARITHROMYCIN 500 MG/1
500 TABLET ORAL 2 TIMES DAILY
Qty: 28 TABLET | Refills: 0 | Status: SHIPPED | OUTPATIENT
Start: 2024-10-25 | End: 2024-11-08

## 2024-10-25 NOTE — TELEPHONE ENCOUNTER
Pt was contacted and I discussed provider message below  with the pt's wife Nurys as well as the following education regarding h. Pylori treatment    What are my results?  Your recent testing showed that you were positive for a bacteria called Helicobacter pylori.    What is it?  Helicobacter pylori bacteria is a very common chronic infection in the stomach (there is nothing necessarily that you did or ate to acquire this). It is most often passed from hand to mouth transmission.  There is no way to accurately know when you developed this infection.  However, it is associated with stomach ulcers, abdominal discomfort and a slight risk of stomach cancer, so it should be treated with antibiotics. Your family does not need to be tested for this infection unless symptomatic and to prevent passing the infection you should continue to practice good hand washing.    How do I treat it?  H. pylori is a bacteria that can be resistant to many antibiotics, so you will need to take a combination of medications for 14 days to treat this infection.  If taken correctly, it can cure the infection most of the time, though rarely another different course of antibiotics may be necessary. Taking the medications as prescribed for the full 14 days is the best way to ensure that we get rid of the bacteria.     **DO NOT START TREATMENT UNTIL YOU HAVE ALL MEDICATIONS PRESCRIBED FOR THIS TREATMENT FROM THE PHARMACY- NO EXCEPTIONS.**  You have been prescribed a total of  3 medications for your treatment.   If you are taking a statin medication (for cholesterol) or tamsulosin you will need to hold these therapies while taking the antibiotics as they interact with each other.  You can restart them after finishing the antibiotics.   Do not drink any alcohol while on the treatment.   You may take antibiotics with food to avoid stomach upset.    What should I know about the treatment?  Many people experience sided effects while on these medications  such as headache, nausea, etc. And will resolve after completion of therapy.  If you experience an allergic type reaction such as rash, facial swelling, shortness of breath you should stop the medication and contact our office or seek emergent medical attention if the reaction is severe.   They all can also cause c.diff infection and I recommend taking a probiotic over the counter during this time, like Florastor, Culturelle, or Align.     Please note that some of the antibiotics may cause an unpleasant or metallic taste, this is not an indication to stop the treatment and will resolve upon treatment completion.    How do I make sure it's gone?  After finishing treatment, we will check to make sure we successfully treated the infection by having you submit a stool sample or complete a breath test at the lab.     Entered recall for h pylori breath test in 10 weeks. On or around 01/01/2025     See other TE from today for EGD recall

## 2024-10-25 NOTE — TELEPHONE ENCOUNTER
Recall EGD in 1 year per DR ANTOINE EGD done 10/18/2024. Due 10/18/2025    Specialty notes updated and message sent to pt outreach to repeat EGD in one year

## 2024-10-25 NOTE — TELEPHONE ENCOUNTER
GI staff:    Patient has H. Pylori on gastric biopsies. Also with intestinal metaplasia.    We will plan to treat for H. Pylori and repeat EGD in 1 years.    We will start with triple therapy.    Can you please call the patient and let him know he has h. Pylori and this could explain his abdominal discomfort.    Will treat and repeat h. Pylori breath test in 10 weeks.    Explain that 80-95% of patients have eradication to initial course of abx, and therefore may need another course of abx if fails first treatment. There may be side-effects during treatment and patient should call if any problems. Instruct the patient to check H.pylori antigen in 8-10 weeks to ensure eradication (order placed).    Will start w/ triple therapy (Omeprazole BID+ Amoxicillin BID+ Clarithromycin BID) x 14 days.     Recall patient to have EGD in 1 year.

## 2024-10-25 NOTE — TELEPHONE ENCOUNTER
Pt was contacted and discussed provider message below as well as the following education w/ the patient regarding h. Pylori treatment (***GI RN to send via [facility] if pt is [facility] active):    What are my results?  Your recent testing showed that you were positive for a bacteria called Helicobacter pylori.    What is it?  Helicobacter pylori bacteria is a very common chronic infection in the stomach (there is nothing necessarily that you did or ate to acquire this). It is most often passed from hand to mouth transmission.  There is no way to accurately know when you developed this infection.  However, it is associated with stomach ulcers, abdominal discomfort and a slight risk of stomach cancer, so it should be treated with antibiotics. Your family does not need to be tested for this infection unless symptomatic and to prevent passing the infection you should continue to practice good hand washing.    How do I treat it?  H. pylori is a bacteria that can be resistant to many antibiotics, so you will need to take a combination of medications for 14 days to treat this infection.  If taken correctly, it can cure the infection most of the time, though rarely another different course of antibiotics may be necessary. Taking the medications as prescribed for the full 14 days is the best way to ensure that we get rid of the bacteria.     **DO NOT START TREATMENT UNTIL YOU HAVE ALL MEDICATIONS PRESCRIBED FOR THIS TREATMENT FROM THE PHARMACY- NO EXCEPTIONS.**  You have been prescribed a total of  ____ medications for your treatment.   If you are taking a statin medication (for cholesterol) or tamsulosin you will need to hold these therapies while taking the antibiotics as they interact with each other.  You can restart them after finishing the antibiotics.   Do not drink any alcohol while on the treatment.   You may take antibiotics with food to avoid stomach upset.    What should I know about the treatment?  Many people  experience sided effects while on these medications such as headache, nausea, etc. And will resolve after completion of therapy.  If you experience an allergic type reaction such as rash, facial swelling, shortness of breath you should stop the medication and contact or office or seek emergent medical attention if the reaction is severe.   They all can also cause c.diff infection and I recommend taking a probiotic over the counter during this time, like Florastor, Culturelle, or Align.     Please note that some of the antibiotics may cause an unpleasant or metallic taste, this is not an indication to stop the treatment and will resolve upon treatment completion.    How do I make sure it's gone?  After finishing treatment, we will check to make sure we successfully treated the infection by having you submit a stool sample to the lab.  You should pick-up a collection kit which will contain a white hat that goes into your toilet seat and a specimen container with a collection utensil.  When you have a bowel movement the stool will fall into the hat after which you can use the utensil to put a sample into the container and submit to the lab.  Your provider will order the stool test which you should get done at the lab 8-10 weeks after you finish the treatment. If you are taking a medication for acid reflux such as omeprazole/prilosec, esomeprazole/nexium, pantoprazole/protonix, lansoprazole/prevacid, famotidine/pepcid, or aciphex you will need to stop taking these medications 2 weeks before you submit the stool sample to make sure results are accurate.    What next?  If you have any additional questions and/or concerns, or experience a bad reaction to the medications you may contact our office at  [phone].  -------------------------------------------------------------------------------------------------------------------------------------------------------------------------------------------------------------  **REMINDERS FOR NURSES***  If provider RX'd-Clarithromycin- hold any STATIN/tamsulosin/escitalopram for duration of treatment as it is contraindicated- NEED A PROVIDER ORDER IN ORDER TO INFORM THE PATIENT TO HOLD!  If provider RX'd-Pepto bismol/bismuth subsalicylate inform the patient to purchase OTC and review the instructions as the pharmacy does not fill or tell the patient that it is part of the regimen.  Enter 8-10 wk recall s/p tx in Patient Outreach and document the following: \"Entered recall into Epic: Per _(provider name)__ Repeat h.pylori stool OR breath test (per provider preference) due approximately:__(date)__.\"

## 2024-10-25 NOTE — TELEPHONE ENCOUNTER
Shaun Malave MD 7:25 AM     GI staff:     Patient has H. Pylori on gastric biopsies. Also with intestinal metaplasia.     We will plan to treat for H. Pylori and repeat EGD in 1 years.

## 2024-10-29 ENCOUNTER — OFFICE VISIT (OUTPATIENT)
Dept: INTERNAL MEDICINE CLINIC | Facility: CLINIC | Age: 69
End: 2024-10-29

## 2024-10-29 VITALS
RESPIRATION RATE: 17 BRPM | WEIGHT: 221 LBS | HEART RATE: 67 BPM | DIASTOLIC BLOOD PRESSURE: 64 MMHG | TEMPERATURE: 98 F | OXYGEN SATURATION: 99 % | HEIGHT: 71 IN | SYSTOLIC BLOOD PRESSURE: 116 MMHG | BODY MASS INDEX: 30.94 KG/M2

## 2024-10-29 DIAGNOSIS — I10 PRIMARY HYPERTENSION: ICD-10-CM

## 2024-10-29 DIAGNOSIS — E78.2 MIXED HYPERLIPIDEMIA: ICD-10-CM

## 2024-10-29 DIAGNOSIS — I25.10 CORONARY ARTERY DISEASE INVOLVING NATIVE CORONARY ARTERY OF NATIVE HEART WITHOUT ANGINA PECTORIS: ICD-10-CM

## 2024-10-29 DIAGNOSIS — M25.552 BILATERAL HIP PAIN: ICD-10-CM

## 2024-10-29 DIAGNOSIS — E11.65 TYPE 2 DIABETES MELLITUS WITH HYPERGLYCEMIA, WITH LONG-TERM CURRENT USE OF INSULIN (HCC): ICD-10-CM

## 2024-10-29 DIAGNOSIS — R60.0 BILATERAL LEG EDEMA: Primary | ICD-10-CM

## 2024-10-29 DIAGNOSIS — M25.551 BILATERAL HIP PAIN: ICD-10-CM

## 2024-10-29 DIAGNOSIS — Z79.4 TYPE 2 DIABETES MELLITUS WITH HYPERGLYCEMIA, WITH LONG-TERM CURRENT USE OF INSULIN (HCC): ICD-10-CM

## 2024-10-29 PROCEDURE — 99214 OFFICE O/P EST MOD 30 MIN: CPT | Performed by: INTERNAL MEDICINE

## 2024-10-29 RX ORDER — INSULIN LISPRO 100 [IU]/ML
25 INJECTION, SOLUTION INTRAVENOUS; SUBCUTANEOUS 3 TIMES DAILY
Qty: 75 ML | Refills: 0 | Status: SHIPPED | OUTPATIENT
Start: 2024-10-29

## 2024-10-29 RX ORDER — ASPIRIN 81 MG/1
81 TABLET ORAL DAILY
Qty: 90 TABLET | Refills: 3 | Status: SHIPPED | OUTPATIENT
Start: 2024-10-29

## 2024-10-29 RX ORDER — LEVOTHYROXINE SODIUM 125 UG/1
125 TABLET ORAL
Qty: 90 TABLET | Refills: 0 | Status: SHIPPED | OUTPATIENT
Start: 2024-10-29

## 2024-10-29 RX ORDER — INSULIN GLARGINE 100 [IU]/ML
60 INJECTION, SOLUTION SUBCUTANEOUS EVERY MORNING
Qty: 60 ML | Refills: 3 | Status: SHIPPED | OUTPATIENT
Start: 2024-10-29

## 2024-10-29 RX ORDER — ISOSORBIDE MONONITRATE 30 MG/1
30 TABLET, EXTENDED RELEASE ORAL DAILY
Qty: 90 TABLET | Refills: 0 | Status: SHIPPED | OUTPATIENT
Start: 2024-10-29

## 2024-10-29 RX ORDER — GLIPIZIDE 10 MG/1
10 TABLET ORAL
Qty: 90 TABLET | Refills: 1 | Status: SHIPPED | OUTPATIENT
Start: 2024-10-29 | End: 2025-01-27

## 2024-10-29 RX ORDER — EZETIMIBE 10 MG/1
10 TABLET ORAL NIGHTLY
Qty: 90 TABLET | Refills: 0 | Status: SHIPPED | OUTPATIENT
Start: 2024-10-29

## 2024-10-29 NOTE — PROGRESS NOTES
Subjective:     Patient ID: Samy Hoskins is a 69 year old male.    HPI  Patient comes in for follow-up complains of ongoing swelling to bilateral lower extremities patient seen cardiology had echo stress test recently both normal.  He is taking Lasix which helps the swelling is on and off for the last few days it has been worse he says he watches his diet does not eat salty food wife usually cooks at home  Patient also supposed to follow-up with endocrine recently missed appointment we will reschedule also had an EGD recently positive for H. pylori taking the antibiotic and the PPI.  Patient also with complaint of bilateral hip pain  History/Other:   Review of Systems   Constitutional: Negative.  Negative for fatigue and fever.   HENT: Negative.  Negative for congestion.    Eyes: Negative.    Respiratory: Negative.  Negative for cough, shortness of breath and wheezing.    Cardiovascular:  Positive for leg swelling. Negative for chest pain and palpitations.   Gastrointestinal: Negative.    Endocrine: Negative for cold intolerance and heat intolerance.   Genitourinary: Negative.  Negative for dysuria, flank pain and hematuria.   Musculoskeletal:  Positive for arthralgias. Negative for back pain and myalgias.        Bilateral hip pain   Skin: Negative.    Neurological: Negative.  Negative for dizziness, tremors, syncope, weakness and headaches.   Psychiatric/Behavioral: Negative.  Negative for agitation, behavioral problems and suicidal ideas. The patient is not nervous/anxious.      Current Outpatient Medications   Medication Sig Dispense Refill    glipiZIDE 10 MG Oral Tab Take 1 tablet (10 mg total) by mouth 2 (two) times daily before meals. 90 tablet 1    ezetimibe 10 MG Oral Tab Take 1 tablet (10 mg total) by mouth nightly. 90 tablet 0    levothyroxine 125 MCG Oral Tab Take 1 tablet (125 mcg total) by mouth before breakfast. 90 tablet 0    aspirin 81 MG Oral Tab EC Take 1 tablet (81 mg total) by mouth daily. 90  tablet 3    isosorbide mononitrate ER 30 MG Oral Tablet 24 Hr Take 1 tablet (30 mg total) by mouth daily. 90 tablet 0    insulin glargine (LANTUS SOLOSTAR) 100 UNIT/ML Subcutaneous Solution Pen-injector Inject 60 Units into the skin every morning. 60 mL 3    Insulin Lispro, 1 Unit Dial, 100 UNIT/ML Subcutaneous Solution Pen-injector Inject 25 Units into the skin 3 (three) times daily. Inject in the morning, at noon, and at bedtime 75 mL 0    amoxicillin 500 MG Oral Tab Take 2 tablets (1,000 mg total) by mouth 2 (two) times daily for 14 days. 56 tablet 0    omeprazole 20 MG Oral Capsule Delayed Release Take 1 capsule (20 mg total) by mouth 2 (two) times daily before meals for 14 days. 28 capsule 0    clarithromycin 500 MG Oral Tab Take 1 tablet (500 mg total) by mouth 2 (two) times daily for 14 days. 28 tablet 0    rosuvastatin 20 MG Oral Tab Take 1 tablet (20 mg total) by mouth nightly. 90 tablet 3    Continuous Glucose  (FREESTYLE AJAY 2 READER) Does not apply Device Inject one each into the skin every fourteen days 6 each 3    Continuous Glucose Sensor (FREESTYLE AJAY 3 SENSOR) Does not apply Misc Inject 1 each into the skin every 14 (fourteen) days. 6 each 3    Insulin Pen Needle (PEN NEEDLES) 32G X 4 MM Does not apply Misc Inject 1 Needle into the skin 4 (four) times daily. For use with insulins 400 each 3    furosemide 40 MG Oral Tab Take 1 tablet (40 mg total) by mouth 2 (two) times daily. 180 tablet 3    Empagliflozin (JARDIANCE) 25 MG Oral Tab Take 25 mg by mouth daily. 90 tablet 2    amLODIPine 10 MG Oral Tab Take 1 tablet (10 mg total) by mouth daily. 90 tablet 3    losartan 25 MG Oral Tab Take 1 tablet (25 mg total) by mouth daily. 90 tablet 3     Allergies:Allergies[1]    Past Medical History:    Atherosclerosis of coronary artery    Deaf, right    Diabetes (HCC)    Disorder of thyroid    Essential hypertension    High blood pressure    High cholesterol    Hyperlipidemia    Thyroid disease       Past Surgical History:   Procedure Laterality Date    Appendectomy  2012    Coronary stent placement      Rotator cuff repair Left       Family History   Problem Relation Age of Onset    Colon Cancer Neg     Colon Polyps Neg       Social History:   Social History     Socioeconomic History    Marital status:    Tobacco Use    Smoking status: Former     Passive exposure: Never    Smokeless tobacco: Never   Vaping Use    Vaping status: Never Used   Substance and Sexual Activity    Alcohol use: Not Currently    Drug use: Never     Social Drivers of Health     Financial Resource Strain: Low Risk  (1/9/2024)    Financial Resource Strain     Difficulty of Paying Living Expenses: Not hard at all     Med Affordability: No   Food Insecurity: No Food Insecurity (12/30/2023)    Food Insecurity     Food Insecurity: Never true   Transportation Needs: No Transportation Needs (1/9/2024)    Transportation Needs     Lack of Transportation: No   Housing Stability: Low Risk  (12/30/2023)    Housing Stability     Housing Instability: No        Objective:   Physical Exam  Vitals and nursing note reviewed.   Constitutional:       Appearance: He is well-developed.   HENT:      Head: Normocephalic and atraumatic.      Right Ear: External ear normal.      Left Ear: External ear normal.      Nose: Nose normal.   Eyes:      Conjunctiva/sclera: Conjunctivae normal.      Pupils: Pupils are equal, round, and reactive to light.   Cardiovascular:      Rate and Rhythm: Normal rate and regular rhythm.      Heart sounds: Normal heart sounds.   Pulmonary:      Effort: Pulmonary effort is normal.      Breath sounds: Normal breath sounds.   Abdominal:      General: Bowel sounds are normal.      Palpations: Abdomen is soft.   Genitourinary:     Penis: Normal.       Prostate: Normal.      Rectum: Normal.   Musculoskeletal:         General: Tenderness present. Normal range of motion.      Cervical back: Normal range of motion and neck supple.       Right lower leg: Edema present.      Left lower leg: Edema present.      Comments: Bl hip pain   Skin:     General: Skin is warm and dry.   Neurological:      Mental Status: He is alert and oriented to person, place, and time.      Deep Tendon Reflexes: Reflexes are normal and symmetric.         Assessment & Plan:   1. Bilateral leg edema will get an ultrasound to rule out venous insufficiency   2. Type 2 diabetes mellitus with hyperglycemia, with long-term current use of insulin (HCC) continue current treatment follows with endocrine   3. Mixed hyperlipidemia continue current treatment   4. Coronary artery disease involving native coronary artery of native heart without angina pectoris medical management follows with cardiology   5. Primary hypertension continue current treatment   6. Bilateral hip pain follow-up with physiatry       No orders of the defined types were placed in this encounter.      Meds This Visit:  Requested Prescriptions     Signed Prescriptions Disp Refills    glipiZIDE 10 MG Oral Tab 90 tablet 1     Sig: Take 1 tablet (10 mg total) by mouth 2 (two) times daily before meals.    ezetimibe 10 MG Oral Tab 90 tablet 0     Sig: Take 1 tablet (10 mg total) by mouth nightly.    levothyroxine 125 MCG Oral Tab 90 tablet 0     Sig: Take 1 tablet (125 mcg total) by mouth before breakfast.    aspirin 81 MG Oral Tab EC 90 tablet 3     Sig: Take 1 tablet (81 mg total) by mouth daily.    isosorbide mononitrate ER 30 MG Oral Tablet 24 Hr 90 tablet 0     Sig: Take 1 tablet (30 mg total) by mouth daily.    insulin glargine (LANTUS SOLOSTAR) 100 UNIT/ML Subcutaneous Solution Pen-injector 60 mL 3     Sig: Inject 60 Units into the skin every morning.    Insulin Lispro, 1 Unit Dial, 100 UNIT/ML Subcutaneous Solution Pen-injector 75 mL 0     Sig: Inject 25 Units into the skin 3 (three) times daily. Inject in the morning, at noon, and at bedtime       Imaging & Referrals:  PHYSIATRY - INTERNAL  XR HIP W OR WO PELVIS 3  OR 4 VIEWS, BILAT(CPT=73522)  US VENOUS INSUFFICIENCY (REFLUX) BILAT LOWER EXT (CPT=93970)            [1]   Allergies  Allergen Reactions    Shrimp OTHER (SEE COMMENTS)     Throat tightness

## 2024-11-21 LAB — AMB EXT GMI: 8.1 %

## 2024-12-02 ENCOUNTER — HOSPITAL ENCOUNTER (EMERGENCY)
Facility: HOSPITAL | Age: 69
Discharge: HOME | End: 2024-12-02
Attending: EMERGENCY MEDICINE
Payer: MEDICAID

## 2024-12-02 ENCOUNTER — APPOINTMENT (OUTPATIENT)
Dept: CT IMAGING | Facility: HOSPITAL | Age: 69
End: 2024-12-02
Attending: EMERGENCY MEDICINE
Payer: MEDICAID

## 2024-12-02 VITALS
HEART RATE: 68 BPM | OXYGEN SATURATION: 98 % | RESPIRATION RATE: 18 BRPM | DIASTOLIC BLOOD PRESSURE: 84 MMHG | TEMPERATURE: 98 F | SYSTOLIC BLOOD PRESSURE: 158 MMHG

## 2024-12-02 DIAGNOSIS — R10.31 RLQ ABDOMINAL PAIN: Primary | ICD-10-CM

## 2024-12-02 LAB
ALBUMIN SERPL-MCNC: 3.8 G/DL (ref 3.2–4.8)
ALP LIVER SERPL-CCNC: 88 U/L
ALT SERPL-CCNC: 26 U/L
ANION GAP SERPL CALC-SCNC: 7 MMOL/L (ref 0–18)
AST SERPL-CCNC: 17 U/L (ref ?–34)
BASOPHILS # BLD AUTO: 0.07 X10(3) UL (ref 0–0.2)
BASOPHILS NFR BLD AUTO: 0.7 %
BILIRUB DIRECT SERPL-MCNC: <0.1 MG/DL (ref ?–0.3)
BILIRUB SERPL-MCNC: 0.3 MG/DL (ref 0.2–1.1)
BILIRUB UR QL: NEGATIVE
BUN BLD-MCNC: 25 MG/DL (ref 9–23)
BUN/CREAT SERPL: 20 (ref 10–20)
CALCIUM BLD-MCNC: 9.5 MG/DL (ref 8.7–10.4)
CHLORIDE SERPL-SCNC: 105 MMOL/L (ref 98–112)
CLARITY UR: CLEAR
CO2 SERPL-SCNC: 27 MMOL/L (ref 21–32)
COLOR UR: YELLOW
CREAT BLD-MCNC: 1.25 MG/DL
DEPRECATED RDW RBC AUTO: 40.2 FL (ref 35.1–46.3)
EGFRCR SERPLBLD CKD-EPI 2021: 63 ML/MIN/1.73M2 (ref 60–?)
EOSINOPHIL # BLD AUTO: 0.16 X10(3) UL (ref 0–0.7)
EOSINOPHIL NFR BLD AUTO: 1.5 %
ERYTHROCYTE [DISTWIDTH] IN BLOOD BY AUTOMATED COUNT: 12.9 % (ref 11–15)
GLUCOSE BLD-MCNC: 254 MG/DL (ref 70–99)
GLUCOSE UR-MCNC: 150 MG/DL
GRAN CASTS #/AREA URNS LPF: PRESENT /LPF
HCT VFR BLD AUTO: 39.8 %
HGB BLD-MCNC: 13.3 G/DL
HYALINE CASTS #/AREA URNS AUTO: PRESENT /LPF
IMM GRANULOCYTES # BLD AUTO: 0.06 X10(3) UL (ref 0–1)
IMM GRANULOCYTES NFR BLD: 0.6 %
KETONES UR-MCNC: NEGATIVE MG/DL
LEUKOCYTE ESTERASE UR QL STRIP.AUTO: NEGATIVE
LIPASE SERPL-CCNC: 51 U/L (ref 12–53)
LYMPHOCYTES # BLD AUTO: 1.77 X10(3) UL (ref 1–4)
LYMPHOCYTES NFR BLD AUTO: 16.5 %
MCH RBC QN AUTO: 28.9 PG (ref 26–34)
MCHC RBC AUTO-ENTMCNC: 33.4 G/DL (ref 31–37)
MCV RBC AUTO: 86.3 FL
MONOCYTES # BLD AUTO: 0.74 X10(3) UL (ref 0.1–1)
MONOCYTES NFR BLD AUTO: 6.9 %
NEUTROPHILS # BLD AUTO: 7.9 X10 (3) UL (ref 1.5–7.7)
NEUTROPHILS # BLD AUTO: 7.9 X10(3) UL (ref 1.5–7.7)
NEUTROPHILS NFR BLD AUTO: 73.8 %
NITRITE UR QL STRIP.AUTO: NEGATIVE
OSMOLALITY SERPL CALC.SUM OF ELEC: 301 MOSM/KG (ref 275–295)
PH UR: 6 [PH] (ref 5–8)
PLATELET # BLD AUTO: 200 10(3)UL (ref 150–450)
POTASSIUM SERPL-SCNC: 4.2 MMOL/L (ref 3.5–5.1)
PROT SERPL-MCNC: 6 G/DL (ref 5.7–8.2)
PROT UR-MCNC: 600 MG/DL
RBC # BLD AUTO: 4.61 X10(6)UL
SODIUM SERPL-SCNC: 139 MMOL/L (ref 136–145)
SP GR UR STRIP: 1.02 (ref 1–1.03)
UROBILINOGEN UR STRIP-ACNC: NORMAL
WBC # BLD AUTO: 10.7 X10(3) UL (ref 4–11)

## 2024-12-02 PROCEDURE — 80048 BASIC METABOLIC PNL TOTAL CA: CPT | Performed by: EMERGENCY MEDICINE

## 2024-12-02 PROCEDURE — 74177 CT ABD & PELVIS W/CONTRAST: CPT | Performed by: EMERGENCY MEDICINE

## 2024-12-02 PROCEDURE — 99284 EMERGENCY DEPT VISIT MOD MDM: CPT

## 2024-12-02 PROCEDURE — 83690 ASSAY OF LIPASE: CPT | Performed by: EMERGENCY MEDICINE

## 2024-12-02 PROCEDURE — 81001 URINALYSIS AUTO W/SCOPE: CPT | Performed by: EMERGENCY MEDICINE

## 2024-12-02 PROCEDURE — 99285 EMERGENCY DEPT VISIT HI MDM: CPT

## 2024-12-02 PROCEDURE — 85025 COMPLETE CBC W/AUTO DIFF WBC: CPT | Performed by: EMERGENCY MEDICINE

## 2024-12-02 PROCEDURE — 96360 HYDRATION IV INFUSION INIT: CPT

## 2024-12-02 PROCEDURE — 80076 HEPATIC FUNCTION PANEL: CPT | Performed by: EMERGENCY MEDICINE

## 2024-12-02 PROCEDURE — 96361 HYDRATE IV INFUSION ADD-ON: CPT

## 2024-12-02 RX ORDER — ONDANSETRON 4 MG/1
4 TABLET, ORALLY DISINTEGRATING ORAL EVERY 8 HOURS PRN
Qty: 10 TABLET | Refills: 0 | Status: SHIPPED | OUTPATIENT
Start: 2024-12-02 | End: 2024-12-09

## 2024-12-02 RX ORDER — DICYCLOMINE HCL 20 MG
20 TABLET ORAL 4 TIMES DAILY PRN
Qty: 30 TABLET | Refills: 0 | Status: SHIPPED | OUTPATIENT
Start: 2024-12-02 | End: 2025-01-01

## 2024-12-03 ENCOUNTER — OFFICE VISIT (OUTPATIENT)
Dept: INTERNAL MEDICINE CLINIC | Facility: CLINIC | Age: 69
End: 2024-12-03

## 2024-12-03 ENCOUNTER — NURSE TRIAGE (OUTPATIENT)
Dept: INTERNAL MEDICINE CLINIC | Facility: CLINIC | Age: 69
End: 2024-12-03

## 2024-12-03 VITALS
SYSTOLIC BLOOD PRESSURE: 129 MMHG | WEIGHT: 227 LBS | HEIGHT: 71 IN | BODY MASS INDEX: 31.78 KG/M2 | HEART RATE: 60 BPM | DIASTOLIC BLOOD PRESSURE: 65 MMHG | OXYGEN SATURATION: 98 %

## 2024-12-03 DIAGNOSIS — M25.50 ARTHRALGIA, UNSPECIFIED JOINT: Primary | ICD-10-CM

## 2024-12-03 LAB
CRP SERPL-MCNC: <0.4 MG/DL (ref ?–1)
ERYTHROCYTE [SEDIMENTATION RATE] IN BLOOD: 36 MM/HR
RHEUMATOID FACT SERPL-ACNC: 6.4 IU/ML (ref ?–14)

## 2024-12-03 PROCEDURE — 99214 OFFICE O/P EST MOD 30 MIN: CPT | Performed by: INTERNAL MEDICINE

## 2024-12-03 PROCEDURE — 36415 COLL VENOUS BLD VENIPUNCTURE: CPT | Performed by: INTERNAL MEDICINE

## 2024-12-03 NOTE — PROGRESS NOTES
Subjective:     Patient ID: Samy Hoskins is a 69 year old male.    HPI    History/Other: He came in today for follow-up from emergency room.  He went to emergency room yesterday because of abdominal pain according to him he was constipated for few days the pain was mostly periumbilical and towards right side.  In the ED they did a CT scan and everything was normal patient was discharged home with nausea medication  According to his wife he took MiraLAX he did have a bowel movement he is feeling better now  He states that this morning when he woke up he did have swelling on both of his hands he did have some stiffness on both hands the swelling did improve during the day.  He does have pain on his hips as well  Review of Systems   Constitutional: Negative.    HENT: Negative.     Eyes: Negative.    Respiratory: Negative.     Cardiovascular: Negative.    Gastrointestinal: Negative.    Endocrine: Negative.    Genitourinary: Negative.    Musculoskeletal: Negative.    Skin: Negative.    Neurological: Negative.    Hematological: Negative.    Psychiatric/Behavioral: Negative.       Current Outpatient Medications   Medication Sig Dispense Refill    dicyclomine 20 MG Oral Tab Take 1 tablet (20 mg total) by mouth 4 (four) times daily as needed. 30 tablet 0    ondansetron 4 MG Oral Tablet Dispersible Take 1 tablet (4 mg total) by mouth every 8 (eight) hours as needed. 10 tablet 0    glipiZIDE 10 MG Oral Tab Take 1 tablet (10 mg total) by mouth 2 (two) times daily before meals. 90 tablet 1    ezetimibe 10 MG Oral Tab Take 1 tablet (10 mg total) by mouth nightly. 90 tablet 0    levothyroxine 125 MCG Oral Tab Take 1 tablet (125 mcg total) by mouth before breakfast. 90 tablet 0    aspirin 81 MG Oral Tab EC Take 1 tablet (81 mg total) by mouth daily. 90 tablet 3    isosorbide mononitrate ER 30 MG Oral Tablet 24 Hr Take 1 tablet (30 mg total) by mouth daily. 90 tablet 0    insulin glargine (LANTUS SOLOSTAR) 100 UNIT/ML Subcutaneous  Solution Pen-injector Inject 60 Units into the skin every morning. 60 mL 3    Insulin Lispro, 1 Unit Dial, 100 UNIT/ML Subcutaneous Solution Pen-injector Inject 25 Units into the skin 3 (three) times daily. Inject in the morning, at noon, and at bedtime 75 mL 0    rosuvastatin 20 MG Oral Tab Take 1 tablet (20 mg total) by mouth nightly. 90 tablet 3    Continuous Glucose  (FREESTYLE AJAY 2 READER) Does not apply Device Inject one each into the skin every fourteen days 6 each 3    Continuous Glucose Sensor (FREESTYLE AJAY 3 SENSOR) Does not apply Misc Inject 1 each into the skin every 14 (fourteen) days. 6 each 3    Insulin Pen Needle (PEN NEEDLES) 32G X 4 MM Does not apply Misc Inject 1 Needle into the skin 4 (four) times daily. For use with insulins 400 each 3    furosemide 40 MG Oral Tab Take 1 tablet (40 mg total) by mouth 2 (two) times daily. 180 tablet 3    Empagliflozin (JARDIANCE) 25 MG Oral Tab Take 25 mg by mouth daily. 90 tablet 2    amLODIPine 10 MG Oral Tab Take 1 tablet (10 mg total) by mouth daily. 90 tablet 3    losartan 25 MG Oral Tab Take 1 tablet (25 mg total) by mouth daily. 90 tablet 3     Allergies:Allergies[1]    Past Medical History:    Atherosclerosis of coronary artery    Deaf, right    Diabetes (HCC)    Disorder of thyroid    Essential hypertension    High blood pressure    High cholesterol    Hyperlipidemia    Thyroid disease      Past Surgical History:   Procedure Laterality Date    Appendectomy  2012    Coronary stent placement      Rotator cuff repair Left       Family History   Problem Relation Age of Onset    Colon Cancer Neg     Colon Polyps Neg       Social History:   Social History     Socioeconomic History    Marital status:    Tobacco Use    Smoking status: Former     Passive exposure: Never    Smokeless tobacco: Never   Vaping Use    Vaping status: Never Used   Substance and Sexual Activity    Alcohol use: Not Currently    Drug use: Never     Social Drivers of  Health     Financial Resource Strain: Low Risk  (1/9/2024)    Financial Resource Strain     Difficulty of Paying Living Expenses: Not hard at all     Med Affordability: No   Food Insecurity: No Food Insecurity (12/30/2023)    Food Insecurity     Food Insecurity: Never true   Transportation Needs: No Transportation Needs (1/9/2024)    Transportation Needs     Lack of Transportation: No   Housing Stability: Low Risk  (12/30/2023)    Housing Stability     Housing Instability: No        Objective:   Physical Exam  Vitals and nursing note reviewed.   Constitutional:       Appearance: Normal appearance.   HENT:      Head: Normocephalic and atraumatic.   Cardiovascular:      Rate and Rhythm: Normal rate and regular rhythm.      Pulses: Normal pulses.      Heart sounds: Normal heart sounds.   Pulmonary:      Breath sounds: Normal breath sounds.   Abdominal:      Palpations: Abdomen is soft.   Musculoskeletal:         General: Normal range of motion.      Cervical back: Normal range of motion and neck supple.   Skin:     General: Skin is warm.   Neurological:      Mental Status: He is alert. Mental status is at baseline.   Psychiatric:         Mood and Affect: Mood normal.         Assessment & Plan:   1. Arthralgia, unspecified joint    Check for rheumatoid arthritis I ordered CRP sed rate and rheumatoid factor    2 abdominal pain improved most likely secondary to constipation CT abdomen reviewed take high-fiber diet to  avoid constipation    Orders Placed This Encounter   Procedures    Rheumatoid Arthritis Factor [E]    C-Reactive Protein [E]    Sed Rate, Westergren (Automated) [E]       Meds This Visit:  Requested Prescriptions      No prescriptions requested or ordered in this encounter       Imaging & Referrals:  None            [1]   Allergies  Allergen Reactions    Shrimp OTHER (SEE COMMENTS)     Throat tightness

## 2024-12-03 NOTE — TELEPHONE ENCOUNTER
Action Requested: Summary for Provider     []  Critical Lab, Recommendations Needed  [] Need Additional Advice  []   FYI    []   Need Orders  [] Need Medications Sent to Pharmacy  []  Other     SUMMARY: Patient was in the ER yesterday due to abdominal pain. Patient has not taken medications prescribed in the ER. This morning, reports bilateral hand and feet swelling.  Patient able to make a fist. Denies hand pain. Patient reports tingling, like \"ants\" on his hands. Appointment scheduled for today with Dr. Marie. Spouse states they will be able to make it to 2:15p appointment.  [facility] address provided.    Reason for call: Hand Pain (Bilateral hand swelling. No pain, tingling)  Onset: Data Unavailable    Future Appointments   Date Time Provider Department Center   12/3/2024  2:15 PM Leatha Marie MD [facility]                      Reason for Disposition   Tingling (e.g., pins and needles) of the face, arm or leg on one side of the body, that is present now (Exceptions: Chronic or recurrent symptom lasting > 4 weeks; or tingling from known cause, such as: bumped elbow, carpal tunnel syndrome, pinched nerve, frostbite.)    Protocols used: Neurologic Deficit-A-OH

## 2024-12-03 NOTE — ED PROVIDER NOTES
Patient Seen in: [facility] Emergency Department      History     Chief Complaint   Patient presents with    Abdomen/Flank Pain     Stated Complaint: abd pain    Subjective:   HPI      Pulse presents the emergency department complaining of right lower abdominal pain.  He states that for the last 3 days he has had right lower quadrant abdominal discomfort.  There is been mild nausea but no associated vomiting or diarrhea.  There is no other aggravating or alleviating factors.  He has a history of appendectomy.    Objective:     Past Medical History:    Atherosclerosis of coronary artery    Deaf, right    Diabetes (HCC)    Disorder of thyroid    Essential hypertension    High blood pressure    High cholesterol    Hyperlipidemia    Thyroid disease              Past Surgical History:   Procedure Laterality Date    Appendectomy  2012    Coronary stent placement      Rotator cuff repair Left                 Social History     Socioeconomic History    Marital status:    Tobacco Use    Smoking status: Former     Passive exposure: Never    Smokeless tobacco: Never   Vaping Use    Vaping status: Never Used   Substance and Sexual Activity    Alcohol use: Not Currently    Drug use: Never     Social Drivers of Health     Financial Resource Strain: Low Risk  (1/9/2024)    Financial Resource Strain     Difficulty of Paying Living Expenses: Not hard at all     Med Affordability: No   Food Insecurity: No Food Insecurity (12/30/2023)    Food Insecurity     Food Insecurity: Never true   Transportation Needs: No Transportation Needs (1/9/2024)    Transportation Needs     Lack of Transportation: No   Housing Stability: Low Risk  (12/30/2023)    Housing Stability     Housing Instability: No                  Physical Exam     ED Triage Vitals [12/02/24 1416]   /79   Pulse 70   Resp 20   Temp 97.6 °F (36.4 °C)   Temp src Temporal   SpO2 98 %   O2 Device None (Room air)       Current Vitals:   Vital Signs  BP:  158/84  Pulse: 68  Resp: 18  Temp: 97.6 °F (36.4 °C)  Temp src: Temporal    Oxygen Therapy  SpO2: 98 %  O2 Device: None (Room air)        Physical Exam  Vitals and nursing note reviewed.   Constitutional:       General: He is not in acute distress.     Appearance: He is well-developed.   HENT:      Head: Normocephalic.      Nose: Nose normal.      Mouth/Throat:      Mouth: Mucous membranes are moist.   Eyes:      Conjunctiva/sclera: Conjunctivae normal.   Cardiovascular:      Rate and Rhythm: Normal rate and regular rhythm.      Heart sounds: No murmur heard.  Pulmonary:      Effort: Pulmonary effort is normal. No respiratory distress.      Breath sounds: Normal breath sounds.   Abdominal:      General: There is no distension.      Palpations: Abdomen is soft.      Tenderness: There is abdominal tenderness in the right lower quadrant. There is no guarding or rebound.   Musculoskeletal:         General: No tenderness. Normal range of motion.      Cervical back: Normal range of motion and neck supple.   Skin:     General: Skin is warm and dry.      Capillary Refill: Capillary refill takes less than 2 seconds.      Findings: No rash.   Neurological:      General: No focal deficit present.      Mental Status: He is alert and oriented to person, place, and time.             ED Course     Labs Reviewed   CBC WITH DIFFERENTIAL WITH PLATELET - Abnormal; Notable for the following components:       Result Value    Neutrophil Absolute Prelim 7.90 (*)     Neutrophil Absolute 7.90 (*)     All other components within normal limits   BASIC METABOLIC PANEL (8) - Abnormal; Notable for the following components:    Glucose 254 (*)     BUN 25 (*)     Calculated Osmolality 301 (*)     All other components within normal limits   URINALYSIS, ROUTINE - Abnormal; Notable for the following components:    Glucose Urine 150 (*)     Blood Urine 2+ (*)     Protein Urine 600 (*)     RBC Urine 6-10 (*)     Bacteria Urine Rare (*)     Squamous Epi.  Cells Few (*)     Hyaline Casts Present (*)     Granular Casts Present (*)     All other components within normal limits   HEPATIC FUNCTION PANEL (7) - Normal   LIPASE - Normal                   MDM              Medical Decision Making  Differential diagnosis considered for colitis, bowel obstruction, UTI, musculoskeletal pain.    Problems Addressed:  RLQ abdominal pain: acute illness or injury    Amount and/or Complexity of Data Reviewed  Labs: ordered. Decision-making details documented in ED Course.     Details: Labs unremarkable with the exception of mild elevation in blood sugar.  Radiology: ordered and independent interpretation performed. Decision-making details documented in ED Course.     Details: CT abdomen pelvis shows no acute intra-abdominal pathology  Discussion of management or test interpretation with external provider(s): Etiology of pain is unclear at this time.  Recommend Bentyl Tylenol and Zofran and follow-up with his GI doctor.        Disposition and Plan     Clinical Impression:  1. RLQ abdominal pain         Disposition:  Discharge  12/2/2024  5:23 pm    Follow-up:  Kaleb Marie MD  [address]  [phone]    Schedule an appointment as soon as possible for a visit      Shaun Malave MD  [address] [address]  [phone]    Schedule an appointment as soon as possible for a visit      We recommend that you schedule follow up care with a primary care provider within the next three months to obtain basic health screening including reassessment of your blood pressure.      Medications Prescribed:  Discharge Medication List as of 12/2/2024  5:26 PM        START taking these medications    Details   dicyclomine 20 MG Oral Tab Take 1 tablet (20 mg total) by mouth 4 (four) times daily as needed., Normal, Disp-30 tablet, R-0      ondansetron 4 MG Oral Tablet Dispersible Take 1 tablet (4 mg total) by mouth every 8 (eight) hours as  needed., Normal, Disp-10 tablet, R-0                 Supplementary Documentation:

## 2024-12-12 ENCOUNTER — HOSPITAL ENCOUNTER (OUTPATIENT)
Dept: GENERAL RADIOLOGY | Facility: HOSPITAL | Age: 69
Discharge: HOME | End: 2024-12-12
Attending: INTERNAL MEDICINE
Payer: MEDICAID

## 2024-12-12 DIAGNOSIS — M25.552 BILATERAL HIP PAIN: ICD-10-CM

## 2024-12-12 DIAGNOSIS — M25.551 BILATERAL HIP PAIN: ICD-10-CM

## 2024-12-12 PROCEDURE — 73522 X-RAY EXAM HIPS BI 3-4 VIEWS: CPT | Performed by: INTERNAL MEDICINE

## 2024-12-16 ENCOUNTER — TELEPHONE (OUTPATIENT)
Dept: INTERNAL MEDICINE CLINIC | Facility: CLINIC | Age: 69
End: 2024-12-16

## 2024-12-16 NOTE — TELEPHONE ENCOUNTER
No response letter for below generated for xray results.     Please mail to patient.      Nataliia Calvo RN  12/16/2024  4:18 PM CST       Attempted to call, no response or   Sending no response letter.    Enid Worrell RN  12/14/2024  1:28 PM CST       Attempted to call patient and phone rings then goes to a busy signal.    Kaleb Marie MD  12/14/2024 12:36 PM CST        Slight to moderate symmetric degenerative changes within both hips.  Follow-up with physiatry.

## 2024-12-18 ENCOUNTER — HOSPITAL ENCOUNTER (OUTPATIENT)
Dept: ULTRASOUND IMAGING | Facility: HOSPITAL | Age: 69
Discharge: HOME | End: 2024-12-18
Attending: INTERNAL MEDICINE
Payer: MEDICAID

## 2024-12-18 DIAGNOSIS — R60.0 BILATERAL LEG EDEMA: ICD-10-CM

## 2024-12-18 PROCEDURE — 93970 EXTREMITY STUDY: CPT | Performed by: INTERNAL MEDICINE

## 2024-12-19 ENCOUNTER — OFFICE VISIT (OUTPATIENT)
Dept: PODIATRY CLINIC | Facility: CLINIC | Age: 69
End: 2024-12-19

## 2024-12-19 DIAGNOSIS — B35.1 ONYCHOMYCOSIS: ICD-10-CM

## 2024-12-19 DIAGNOSIS — E11.9 TYPE 2 DIABETES MELLITUS WITHOUT COMPLICATION, WITH LONG-TERM CURRENT USE OF INSULIN (HCC): Primary | ICD-10-CM

## 2024-12-19 DIAGNOSIS — Z79.4 TYPE 2 DIABETES MELLITUS WITHOUT COMPLICATION, WITH LONG-TERM CURRENT USE OF INSULIN (HCC): Primary | ICD-10-CM

## 2024-12-19 RX ORDER — CLOTRIMAZOLE 1 G/ML
1 SOLUTION TOPICAL 2 TIMES DAILY
Qty: 60 ML | Refills: 3 | Status: SHIPPED | OUTPATIENT
Start: 2024-12-19

## 2024-12-19 NOTE — PROGRESS NOTES
[facility]  Progress Note      Samy Hoskins is a 69 year old male.   Chief Complaint   Patient presents with    Diabetic Foot Care     Consult- pt is Afghan speaking and deaf 13% hearing and wife is here to help translate- last A1c=9.4 on 7/24/2024- LOV w/ PCP Dr. Marie on 12/03/2024- FBS this ou=259- also here for bilateral foot swelling check             HPI:     Patient is a pleasant 69-year-old diabetic male with difficulty hearing the presents to clinic accompanied by his wife who is providing Turkmen translation.  Patient admits to right hallux thickened toenail.  His toenails today are trimmed within normal length and thickness.  Admits to lower extremity swelling.  Denies any pedal injuries or trauma.  Denies any signs of infection      Allergies: Shrimp    Current Outpatient Medications   Medication Sig Dispense Refill    clotrimazole 1 % External Solution Apply 1 Application topically 2 (two) times daily. 60 mL 3    dicyclomine 20 MG Oral Tab Take 1 tablet (20 mg total) by mouth 4 (four) times daily as needed. 30 tablet 0    glipiZIDE 10 MG Oral Tab Take 1 tablet (10 mg total) by mouth 2 (two) times daily before meals. 90 tablet 1    ezetimibe 10 MG Oral Tab Take 1 tablet (10 mg total) by mouth nightly. 90 tablet 0    levothyroxine 125 MCG Oral Tab Take 1 tablet (125 mcg total) by mouth before breakfast. 90 tablet 0    aspirin 81 MG Oral Tab EC Take 1 tablet (81 mg total) by mouth daily. 90 tablet 3    isosorbide mononitrate ER 30 MG Oral Tablet 24 Hr Take 1 tablet (30 mg total) by mouth daily. 90 tablet 0    insulin glargine (LANTUS SOLOSTAR) 100 UNIT/ML Subcutaneous Solution Pen-injector Inject 60 Units into the skin every morning. 60 mL 3    Insulin Lispro, 1 Unit Dial, 100 UNIT/ML Subcutaneous Solution Pen-injector Inject 25 Units into the skin 3 (three) times daily. Inject in the morning, at noon, and at bedtime 75 mL 0    rosuvastatin 20 MG Oral Tab Take 1 tablet (20 mg total) by mouth  nightly. 90 tablet 3    Continuous Glucose  (FREESTYLE AJAY 2 READER) Does not apply Device Inject one each into the skin every fourteen days 6 each 3    Continuous Glucose Sensor (FREESTYLE AJAY 3 SENSOR) Does not apply Misc Inject 1 each into the skin every 14 (fourteen) days. 6 each 3    Insulin Pen Needle (PEN NEEDLES) 32G X 4 MM Does not apply Misc Inject 1 Needle into the skin 4 (four) times daily. For use with insulins 400 each 3    furosemide 40 MG Oral Tab Take 1 tablet (40 mg total) by mouth 2 (two) times daily. 180 tablet 3    Empagliflozin (JARDIANCE) 25 MG Oral Tab Take 25 mg by mouth daily. 90 tablet 2    amLODIPine 10 MG Oral Tab Take 1 tablet (10 mg total) by mouth daily. 90 tablet 3    losartan 25 MG Oral Tab Take 1 tablet (25 mg total) by mouth daily. 90 tablet 3      Past Medical History:    Atherosclerosis of coronary artery    Deaf, right    Diabetes (HCC)    Disorder of thyroid    Essential hypertension    High blood pressure    High cholesterol    Hyperlipidemia    Thyroid disease      Past Surgical History:   Procedure Laterality Date    Appendectomy  2012    Coronary stent placement      Rotator cuff repair Left       Family History   Problem Relation Age of Onset    Colon Cancer Neg     Colon Polyps Neg       Social History     Socioeconomic History    Marital status:    Tobacco Use    Smoking status: Former     Passive exposure: Never    Smokeless tobacco: Never   Vaping Use    Vaping status: Never Used   Substance and Sexual Activity    Alcohol use: Not Currently    Drug use: Never           REVIEW OF SYSTEMS:     Denies nause, fever, chills  No calf pain  Denies chest pain or SOB      EXAM:   There were no vitals taken for this visit.  GENERAL: well developed, well nourished, in no apparent distress  EXTREMITIES:   1. Integument: Normal skin temperature and turgor. Toenails x 10 are within normal limits  2. Vascular: Dorsalis pedis two out of four bilateral and posterior  tibial pulses two out of   four bilateral, capillary refill normal. Pitting edema to ciera LE.    3. Musculoskeletal: All muscle groups are graded 5 out of 5 in the foot and ankle.   4. Neurological: Normal sharp dull sensation; reflexes normal.      US VENOUS INSUFFICIENCY (REFLUX) BILAT LOWER EXT (CPT=93970)    Result Date: 12/18/2024  CONCLUSION:  1. No evidence of deep venous thrombosis or superficial venous thrombosis in bilateral lower extremities. 2. In the right lower extremity, there is no evidence of reflux in the right great or small saphenous vein.  Vein of Giacomini present.  No significant venous varicosities. 3. In the left lower extremity, there is no evidence of reflux in the left greater or small saphenous vein.  Vein of Giacomini present.  No significant venous varicosities.     Dictated by (CST): Joaquin Nicolas MD on 12/18/2024 at 6:45 PM     Finalized by (CST): Joaquin Nicolas MD on 12/18/2024 at 6:49 PM          XR HIP W OR WO PELVIS 3 OR 4 VIEWS, BILAT(CPT=73522)    Result Date: 12/13/2024  CONCLUSION: Slight to moderate symmetric degenerative changes within both hips.    Dictated by (CST): Krystian Luke MD on 12/13/2024 at 4:53 PM     Finalized by (CST): Krystian Luke MD on 12/13/2024 at 4:54 PM            ASSESSMENT AND PLAN:   Diagnoses and all orders for this visit:    Type 2 diabetes mellitus without complication, with long-term current use of insulin ([facility])    Onychomycosis    Other orders  -     clotrimazole 1 % External Solution; Apply 1 Application topically 2 (two) times daily.        Plan:         -Patient examined, chart history reviewed.  -Discussed importance of proper pedal hygiene, regular foot checks, and tight glucose control.  -no nail trimming as they were within normal limits  -recommend knee high 15-20 mmHg compression socks  -rx for topical antifungal to be applied as directed  -Ambulate with supportive shoes and inserts and avoid walking barefoot.  -Educated patient  on acute signs of infection advised patient to seek immediate medical attention if symptoms arise.    RTC in 3 months for nail care.     The patient indicates understanding of these issues and agrees to the plan.        Marilou Gudino DPM

## 2024-12-21 ENCOUNTER — TELEPHONE (OUTPATIENT)
Dept: INTERNAL MEDICINE CLINIC | Facility: CLINIC | Age: 69
End: 2024-12-21

## 2024-12-21 NOTE — TELEPHONE ENCOUNTER
Patient wife says she received a letter regarding his test results and patient was returning the call.

## 2024-12-23 NOTE — TELEPHONE ENCOUNTER
Left voicemail to call back on spouses voicemail. No answer on patient's phone. MyChart sent.         XR HIP W OR WO PELVIS 3 OR 4 VIEWS, BILAT(CPT=73522): Result Notes     Nataliia Calvo RN  12/16/2024  4:18 PM CST       Attempted to call, no response or   Sending no response letter.    Enid Worrell RN  12/14/2024  1:28 PM CST       Attempted to call patient and phone rings then goes to a busy signal.    Kaleb Marie MD  12/14/2024 12:36 PM CST        Slight to moderate symmetric degenerative changes within both hips.  Follow-up with physiatry.

## 2024-12-24 NOTE — TELEPHONE ENCOUNTER
Spoke to patient's spouse Nurys (on Release of Information), verified Name and . Relayed Dr. Kaleb Marie's message below. Verbalized understanding. Physiatry referral contact information given. No further questions at this time.

## 2025-01-15 ENCOUNTER — TELEPHONE (OUTPATIENT)
Facility: CLINIC | Age: 70
End: 2025-01-15

## 2025-01-15 NOTE — TELEPHONE ENCOUNTER
1/15/25 1st letter sent to patient's home -cs    Calprotectin, Fecal (Order #[id number]) on 10/19/24

## 2025-01-20 ENCOUNTER — TELEPHONE (OUTPATIENT)
Facility: CLINIC | Age: 70
End: 2025-01-20

## 2025-01-20 DIAGNOSIS — Z86.19 HISTORY OF HELICOBACTER PYLORI INFECTION: Primary | ICD-10-CM

## 2025-01-20 NOTE — TELEPHONE ENCOUNTER
Patient outreach message received:    Message entered into pt outreach to complete h pylori breath test in 10 weeks (01/012025)

## 2025-01-21 NOTE — TELEPHONE ENCOUNTER
Pt treated with amoxicillin clarithromycin & omeprazole 10/25/2025    H pylori breath test ordered    Pt not taking PPI/H2 blockers    Will avoid food and drink for one hour prior to testing

## 2025-01-22 ENCOUNTER — TELEPHONE (OUTPATIENT)
Dept: PHYSICAL MEDICINE AND REHAB | Facility: CLINIC | Age: 70
End: 2025-01-22

## 2025-01-22 ENCOUNTER — OFFICE VISIT (OUTPATIENT)
Dept: PHYSICAL MEDICINE AND REHAB | Facility: CLINIC | Age: 70
End: 2025-01-22
Payer: MEDICAID

## 2025-01-22 VITALS — BODY MASS INDEX: 31.78 KG/M2 | HEIGHT: 71 IN | WEIGHT: 227 LBS

## 2025-01-22 DIAGNOSIS — M16.0 BILATERAL HIP JOINT ARTHRITIS: ICD-10-CM

## 2025-01-22 DIAGNOSIS — M25.551 GREATER TROCHANTERIC PAIN SYNDROME OF BOTH LOWER EXTREMITIES: Primary | ICD-10-CM

## 2025-01-22 DIAGNOSIS — M25.552 GREATER TROCHANTERIC PAIN SYNDROME OF BOTH LOWER EXTREMITIES: Primary | ICD-10-CM

## 2025-01-22 PROCEDURE — 99204 OFFICE O/P NEW MOD 45 MIN: CPT | Performed by: PHYSICAL MEDICINE & REHABILITATION

## 2025-01-22 PROCEDURE — 20611 DRAIN/INJ JOINT/BURSA W/US: CPT | Performed by: PHYSICAL MEDICINE & REHABILITATION

## 2025-01-22 NOTE — PROGRESS NOTES
NEW PATIENT VISIT    CHIEF COMPLAINT  Bilateral hip pain      HISTORY OF PRESENTING ILLNESS  Samy Hoskins is a 69 year old male who presents for evaluation of Bilateral hip pain.  Patient is referred to my office with complaints of bilateral hip pain.  Patient states that his symptoms been present for the last 4 to 5 years, worsening in the last 3 or so months.  Generally speaking the lateral aspect of his bilateral hips with some radiation into the bilateral gluteal muscles.    Made worse with prolonged standing and prolonged walking, recently had to stop dancing secondary to pretty significant and severe pain in the bilateral lateral hip area.    Denies radiation of symptoms distally.    Intermittently with hip abduction or if he stumbles he will note groin pain bilaterally.  Has x-rays completed for his primary care physician which are reviewed in full below.  Currently using Tylenol as needed.  No red flags.    PAST MEDICAL HISTORY  Past Medical History:    Atherosclerosis of coronary artery    Deaf, right    Diabetes (HCC)    Disorder of thyroid    Essential hypertension    High blood pressure    High cholesterol    Hyperlipidemia    Thyroid disease       PAST SURGICAL HISTORY  Past Surgical History:   Procedure Laterality Date    Appendectomy  2012    Coronary stent placement      Rotator cuff repair Left        MEDICATIONS  Medications Ordered Prior to Encounter[1]    ALLERGIES  Allergies[2]    SOCIAL HISTORY   reports that he has quit smoking. He has never been exposed to tobacco smoke. He has never used smokeless tobacco. He reports that he does not currently use alcohol. He reports that he does not use drugs.     FAMILY HISTORY  Family History   Problem Relation Age of Onset    Colon Cancer Neg     Colon Polyps Neg        REVIEW OF SYSTEMS  Complete review of systems was performed and was negative except for those items stated in the History of Presenting Illness and Past Medical/Surgical  History.    PHYSICAL EXAMINATION  GENERAL:  In no acute distress. Well-developed and well nourished.   SKIN: No rashes or open wounds involving bilateral lower extremities, hip, posterior torso.  NEUROLOGIC:   Strength: 5/5 bilaterally with hip flexion, knee extension, knee flexion, ankle dorsiflexion, ankle eversion, ankle inversion, ankle plantarflexion, and great toe extension.   Sensation: intact light touch sensation throughout both lower extremities.   Reflexes: intact and symmetric in bilateral lower extremities. Babinski downgoing bilaterally. No clonus.   Gait: able to heel walk, toe walk, and perform tandem gait.   MUSCULOSKELETAL:  Inspection: Normal alignment of lumbar spine. No shift or scoliosis. Normal posture. Equal iliac crest heights. No pelvic asymmetry.   Palpation: He has tenderness to palpation over the bilateral greater trochanters with extension of the gluteal tendons bilaterally.  He has positive Ghulam bilaterally and FADIR maneuvers with reproduction of deep groin pain.      REVIEW OF PRIOR X-RAYS/STUDIES  Independently viewed the plain film radiographs of the bilateral hips dated 12/12/2024 which reveals moderate symmetric degenerative changes in the bilateral hips    IMPRESSION/DIAGNOSIS  Encounter Diagnoses   Name Primary?    Greater trochanteric pain syndrome of both lower extremities Yes    Bilateral hip joint arthritis        TREATMENT/PLAN  I believe he has symptomatic greater trochanteric pain syndrome bilaterally as well as symptomatic bilateral hip osteoarthritis.  We discussed interventional options for all the above and he is interested in proceeding.  Will start with bilateral greater trochanteric bursa injections under ultrasound guidance and then send him to physical therapy working on hip stabilization, core strengthening and buttock strengthening.    He will follow-up with me in about 4 weeks at which time if indicated we we will consider performing bilateral hip injections  under ultrasound guidance.    Education was provided regarding the above impression/diagnosis and treatment options/plan were discussed.  All questions were answered during today's visit.  Patient will contact the clinic if there are any other questions or concerns.            Peter Arteaga DO  Interventional Spine and Sports Medicine Specialist   Physical Medicine and Rehabilitation  [address] [id number] [place] [id number]    [facility]  [address] [facility]. [address] [place] [address]               [1]   Current Outpatient Medications on File Prior to Visit   Medication Sig Dispense Refill    clotrimazole 1 % External Solution Apply 1 Application topically 2 (two) times daily. 60 mL 3    glipiZIDE 10 MG Oral Tab Take 1 tablet (10 mg total) by mouth 2 (two) times daily before meals. 90 tablet 1    ezetimibe 10 MG Oral Tab Take 1 tablet (10 mg total) by mouth nightly. 90 tablet 0    levothyroxine 125 MCG Oral Tab Take 1 tablet (125 mcg total) by mouth before breakfast. 90 tablet 0    aspirin 81 MG Oral Tab EC Take 1 tablet (81 mg total) by mouth daily. 90 tablet 3    isosorbide mononitrate ER 30 MG Oral Tablet 24 Hr Take 1 tablet (30 mg total) by mouth daily. 90 tablet 0    insulin glargine (LANTUS SOLOSTAR) 100 UNIT/ML Subcutaneous Solution Pen-injector Inject 60 Units into the skin every morning. 60 mL 3    Insulin Lispro, 1 Unit Dial, 100 UNIT/ML Subcutaneous Solution Pen-injector Inject 25 Units into the skin 3 (three) times daily. Inject in the morning, at noon, and at bedtime 75 mL 0    rosuvastatin 20 MG Oral Tab Take 1 tablet (20 mg total) by mouth nightly. 90 tablet 3    Continuous Glucose  (FREESTYLE AJAY 2 READER) Does not apply Device Inject one each into the skin every fourteen days 6 each 3    Continuous Glucose Sensor (FREESTYLE AJAY 3 SENSOR) Does not apply Misc Inject 1 each into the skin every 14 (fourteen) days. 6 each 3    Insulin Pen  Needle (PEN NEEDLES) 32G X 4 MM Does not apply Misc Inject 1 Needle into the skin 4 (four) times daily. For use with insulins 400 each 3    furosemide 40 MG Oral Tab Take 1 tablet (40 mg total) by mouth 2 (two) times daily. 180 tablet 3    Empagliflozin (JARDIANCE) 25 MG Oral Tab Take 25 mg by mouth daily. 90 tablet 2    amLODIPine 10 MG Oral Tab Take 1 tablet (10 mg total) by mouth daily. 90 tablet 3    losartan 25 MG Oral Tab Take 1 tablet (25 mg total) by mouth daily. 90 tablet 3     No current facility-administered medications on file prior to visit.   [2]   Allergies  Allergen Reactions    Shrimp OTHER (SEE COMMENTS)     Throat tightness

## 2025-01-22 NOTE — TELEPHONE ENCOUNTER
Initiated authorization for Bilateral greater trochanteric bursa injections, ultrasound guidance. CPT/HCPCS 96117-52,  x's 2 dx:M25.551/552, M16.0   Completed in the office today.    Status: Approved - no auth required    Authorization is not required based on medical necessity, however, is not a guarantee of payment and may be subject to review once claim is submitted-Covered Benefit.

## 2025-01-22 NOTE — PROCEDURES
PROCEDURE NOTE    DIAGNOSIS  Right greater trochanteric bursitis/pain syndrome    PROCEDURE  Ultrasound guided Right greater trochanteric bursa injection    PERFORMING PHYSICIAN  Peter Arteaga DO    PROCEDURE NOTE  Informed consent was obtained. Risks and benefits of the procedure were explained. The patient was placed in a sidelying position and the Right greater trochanter was identified under ultrasound using the curvilinear transducer. The area of maximal tenderness was identified. The area was prepped with Betadine x 3 and alcohol swab. Sterile ultrasound gel was applied. A 27-gauge 1.5 inch needle was inserted into this area and 1-2 mL of 1% lidocaine was infused subcutaneously to anesthetize the region. Then, a 22-gauge 3.5 inch needle was advanced under ultrasound guidance to the target, using an in-plane approach. Then, 1 mL of 40 mg/mL Triamcinolone, 3 mL of 1% Lidocaine was infused.     The same procedure was then performed on the left side.    The patient tolerated the procedure without complications. Post procedure instructions were provided.        Peter Arteaga DO  Physical Medicine and Rehabilitation / Sports Medicine   [facility]

## 2025-01-23 ENCOUNTER — LAB ENCOUNTER (OUTPATIENT)
Dept: LAB | Facility: HOSPITAL | Age: 70
End: 2025-01-23
Attending: STUDENT IN AN ORGANIZED HEALTH CARE EDUCATION/TRAINING PROGRAM
Payer: MEDICAID

## 2025-01-23 DIAGNOSIS — Z86.19 HISTORY OF HELICOBACTER PYLORI INFECTION: ICD-10-CM

## 2025-01-23 PROCEDURE — 83013 H PYLORI (C-13) BREATH: CPT

## 2025-01-23 RX ORDER — TRIAMCINOLONE ACETONIDE 40 MG/ML
80 INJECTION, SUSPENSION INTRA-ARTICULAR; INTRAMUSCULAR ONCE
Status: COMPLETED | OUTPATIENT
Start: 2025-01-23 | End: 2025-01-23

## 2025-01-23 RX ORDER — LIDOCAINE HYDROCHLORIDE 10 MG/ML
11 INJECTION, SOLUTION INFILTRATION; PERINEURAL ONCE
Status: COMPLETED | OUTPATIENT
Start: 2025-01-23 | End: 2025-01-23

## 2025-01-27 LAB — H PYLORI BREATH TEST: NEGATIVE

## 2025-01-28 ENCOUNTER — OFFICE VISIT (OUTPATIENT)
Dept: INTERNAL MEDICINE CLINIC | Facility: CLINIC | Age: 70
End: 2025-01-28

## 2025-01-28 VITALS
HEIGHT: 71 IN | TEMPERATURE: 98 F | SYSTOLIC BLOOD PRESSURE: 120 MMHG | DIASTOLIC BLOOD PRESSURE: 80 MMHG | BODY MASS INDEX: 29.96 KG/M2 | OXYGEN SATURATION: 99 % | RESPIRATION RATE: 17 BRPM | WEIGHT: 214 LBS | HEART RATE: 78 BPM

## 2025-01-28 DIAGNOSIS — I10 PRIMARY HYPERTENSION: ICD-10-CM

## 2025-01-28 DIAGNOSIS — E78.2 MIXED HYPERLIPIDEMIA: ICD-10-CM

## 2025-01-28 DIAGNOSIS — Z79.4 TYPE 2 DIABETES MELLITUS WITH HYPERGLYCEMIA, WITH LONG-TERM CURRENT USE OF INSULIN (HCC): Primary | ICD-10-CM

## 2025-01-28 DIAGNOSIS — E11.65 TYPE 2 DIABETES MELLITUS WITH HYPERGLYCEMIA, WITH LONG-TERM CURRENT USE OF INSULIN (HCC): Primary | ICD-10-CM

## 2025-01-28 PROCEDURE — 36415 COLL VENOUS BLD VENIPUNCTURE: CPT | Performed by: INTERNAL MEDICINE

## 2025-01-28 PROCEDURE — 99214 OFFICE O/P EST MOD 30 MIN: CPT | Performed by: INTERNAL MEDICINE

## 2025-01-28 RX ORDER — ACYCLOVIR 800 MG/1
1 TABLET ORAL
Qty: 6 EACH | Refills: 3 | Status: SHIPPED | OUTPATIENT
Start: 2025-01-28

## 2025-01-28 RX ORDER — ISOSORBIDE MONONITRATE 30 MG/1
30 TABLET, EXTENDED RELEASE ORAL DAILY
Qty: 90 TABLET | Refills: 0 | OUTPATIENT
Start: 2025-01-28

## 2025-01-28 RX ORDER — LEVOTHYROXINE SODIUM 125 UG/1
125 TABLET ORAL
Qty: 90 TABLET | Refills: 0 | OUTPATIENT
Start: 2025-01-28

## 2025-01-28 RX ORDER — LEVOTHYROXINE SODIUM 125 UG/1
125 TABLET ORAL
Qty: 90 TABLET | Refills: 0 | Status: SHIPPED | OUTPATIENT
Start: 2025-01-28

## 2025-01-28 RX ORDER — ISOSORBIDE MONONITRATE 30 MG/1
30 TABLET, EXTENDED RELEASE ORAL DAILY
Qty: 90 TABLET | Refills: 0 | Status: SHIPPED | OUTPATIENT
Start: 2025-01-28

## 2025-01-28 RX ORDER — EZETIMIBE 10 MG/1
10 TABLET ORAL NIGHTLY
Qty: 90 TABLET | Refills: 0 | OUTPATIENT
Start: 2025-01-28

## 2025-01-28 RX ORDER — EZETIMIBE 10 MG/1
10 TABLET ORAL NIGHTLY
Qty: 90 TABLET | Refills: 0 | Status: SHIPPED | OUTPATIENT
Start: 2025-01-28

## 2025-01-28 NOTE — PROGRESS NOTES
Subjective:     Patient ID: Samy Hoskins is a 69 year old male.    Patient comes in today for follow-up says since he got his shot for the hip his sugars have gone up otherwise doing ok still with cough since he had the flulike symptoms in December getting better but continues to have a mild lingering cough          History/Other:   Review of Systems   Constitutional: Negative.  Negative for fatigue and fever.   HENT: Negative.  Negative for congestion.    Eyes: Negative.    Respiratory:  Positive for cough. Negative for shortness of breath and wheezing.    Cardiovascular: Negative.  Negative for chest pain, palpitations and leg swelling.   Gastrointestinal: Negative.    Endocrine: Negative for cold intolerance and heat intolerance.        Elevated bs   Genitourinary: Negative.  Negative for dysuria, flank pain and hematuria.   Musculoskeletal: Negative.  Negative for arthralgias, back pain and myalgias.   Skin: Negative.    Neurological: Negative.  Negative for dizziness, tremors, syncope, weakness and headaches.   Psychiatric/Behavioral: Negative.  Negative for agitation, behavioral problems and suicidal ideas. The patient is not nervous/anxious.      Current Outpatient Medications   Medication Sig Dispense Refill    levothyroxine 125 MCG Oral Tab Take 1 tablet (125 mcg total) by mouth before breakfast. 90 tablet 0    isosorbide mononitrate ER 30 MG Oral Tablet 24 Hr Take 1 tablet (30 mg total) by mouth daily. 90 tablet 0    ezetimibe 10 MG Oral Tab Take 1 tablet (10 mg total) by mouth nightly. 90 tablet 0    Continuous Glucose Sensor (FREESTYLE AJAY 3 SENSOR) Does not apply Misc Inject 1 each into the skin every 14 (fourteen) days. 6 each 3    Continuous Glucose  (FREESTYLE AJAY 2 READER) Does not apply Device Inject one each into the skin every fourteen days 6 each 3    clotrimazole 1 % External Solution Apply 1 Application topically 2 (two) times daily. 60 mL 3    aspirin 81 MG Oral Tab EC Take 1  tablet (81 mg total) by mouth daily. 90 tablet 3    insulin glargine (LANTUS SOLOSTAR) 100 UNIT/ML Subcutaneous Solution Pen-injector Inject 60 Units into the skin every morning. 60 mL 3    Insulin Lispro, 1 Unit Dial, 100 UNIT/ML Subcutaneous Solution Pen-injector Inject 25 Units into the skin 3 (three) times daily. Inject in the morning, at noon, and at bedtime 75 mL 0    rosuvastatin 20 MG Oral Tab Take 1 tablet (20 mg total) by mouth nightly. 90 tablet 3    Insulin Pen Needle (PEN NEEDLES) 32G X 4 MM Does not apply Misc Inject 1 Needle into the skin 4 (four) times daily. For use with insulins 400 each 3    furosemide 40 MG Oral Tab Take 1 tablet (40 mg total) by mouth 2 (two) times daily. 180 tablet 3    Empagliflozin (JARDIANCE) 25 MG Oral Tab Take 25 mg by mouth daily. 90 tablet 2    amLODIPine 10 MG Oral Tab Take 1 tablet (10 mg total) by mouth daily. 90 tablet 3    losartan 25 MG Oral Tab Take 1 tablet (25 mg total) by mouth daily. 90 tablet 3     Allergies:Allergies[1]    Past Medical History:    Atherosclerosis of coronary artery    Deaf, right    Diabetes (HCC)    Disorder of thyroid    Essential hypertension    High blood pressure    High cholesterol    Hyperlipidemia    Thyroid disease      Past Surgical History:   Procedure Laterality Date    Appendectomy  2012    Coronary stent placement      Rotator cuff repair Left       Family History   Problem Relation Age of Onset    Colon Cancer Neg     Colon Polyps Neg       Social History:   Social History     Socioeconomic History    Marital status:    Tobacco Use    Smoking status: Former     Passive exposure: Never    Smokeless tobacco: Never   Vaping Use    Vaping status: Never Used   Substance and Sexual Activity    Alcohol use: Not Currently    Drug use: Never     Social Drivers of Health     Financial Resource Strain: Patient Declined (1/12/2025)    [facility]    [facility]      Difficulty of Paying Living Expenses: Patient declined   Food Insecurity: Patient Declined (1/12/2025)    Received from [facility]    Hunger Vital Sign     Worried About Running Out of Food in the Last Year: Patient declined     Ran Out of Food in the Last Year: Patient declined   Transportation Needs: Patient Declined (1/12/2025)    Received from [facility]    PRAPARE - Transportation     Lack of Transportation (Medical): Patient declined     Lack of Transportation (Non-Medical): Patient declined   Housing Stability: Unknown (1/12/2025)    Received from [facility]    Housing Stability Vital Sign     Unable to Pay for Housing in the Last Year: No        Objective:   Physical Exam  Vitals and nursing note reviewed.   Constitutional:       Appearance: He is well-developed.   HENT:      Head: Normocephalic and atraumatic.      Right Ear: External ear normal.      Left Ear: External ear normal.      Nose: Nose normal.   Eyes:      Conjunctiva/sclera: Conjunctivae normal.      Pupils: Pupils are equal, round, and reactive to light.   Cardiovascular:      Rate and Rhythm: Normal rate and regular rhythm.      Heart sounds: Normal heart sounds.   Pulmonary:      Effort: Pulmonary effort is normal.      Breath sounds: Normal breath sounds.   Abdominal:      General: Bowel sounds are normal.      Palpations: Abdomen is soft.   Genitourinary:     Penis: Normal.       Prostate: Normal.      Rectum: Normal.   Musculoskeletal:         General: Normal range of motion.      Cervical back: Normal range of motion and neck supple.   Skin:     General: Skin is warm and dry.   Neurological:      Mental Status: He is alert and oriented to person, place, and time.      Deep Tendon Reflexes: Reflexes are normal and symmetric.         Assessment & Plan:   1. Type 2 diabetes mellitus with hyperglycemia, with long-term current use of insulin ([facility]) will retest can adjust insulin  as needed a few  units if blood sugars continue to be high   2. Mixed hyperlipidemia continue current treatment watch diet    3. Primary hypertension  - continue with current care        Orders Placed This Encounter   Procedures    Hemoglobin A1C    Microalb/Creat Ratio, Random Urine       Meds This Visit:  Requested Prescriptions     Signed Prescriptions Disp Refills    levothyroxine 125 MCG Oral Tab 90 tablet 0     Sig: Take 1 tablet (125 mcg total) by mouth before breakfast.    isosorbide mononitrate ER 30 MG Oral Tablet 24 Hr 90 tablet 0     Sig: Take 1 tablet (30 mg total) by mouth daily.    ezetimibe 10 MG Oral Tab 90 tablet 0     Sig: Take 1 tablet (10 mg total) by mouth nightly.    Continuous Glucose Sensor (FREESTYLE AJAY 3 SENSOR) Does not apply Misc 6 each 3     Sig: Inject 1 each into the skin every 14 (fourteen) days.    Continuous Glucose  (FREESTYLE AJAY 2 READER) Does not apply Device 6 each 3     Sig: Inject one each into the skin every fourteen days       Imaging & Referrals:  None            [1]   Allergies  Allergen Reactions    Shrimp OTHER (SEE COMMENTS)     Throat tightness

## 2025-01-29 LAB
CREAT UR-SCNC: 68.7 MG/DL
EST. AVERAGE GLUCOSE BLD GHB EST-MCNC: 206 MG/DL (ref 68–126)
HBA1C MFR BLD: 8.8 % (ref ?–5.7)
MICROALBUMIN UR-MCNC: 132.3 MG/DL
MICROALBUMIN/CREAT 24H UR-RTO: 1925.8 UG/MG (ref ?–30)

## 2025-02-14 ENCOUNTER — OFFICE VISIT (OUTPATIENT)
Dept: PHYSICAL THERAPY | Facility: HOSPITAL | Age: 70
End: 2025-02-14
Attending: PHYSICAL MEDICINE & REHABILITATION
Payer: MEDICAID

## 2025-02-14 DIAGNOSIS — M25.551 GREATER TROCHANTERIC PAIN SYNDROME OF BOTH LOWER EXTREMITIES: Primary | ICD-10-CM

## 2025-02-14 DIAGNOSIS — M25.552 GREATER TROCHANTERIC PAIN SYNDROME OF BOTH LOWER EXTREMITIES: Primary | ICD-10-CM

## 2025-02-14 PROCEDURE — 97110 THERAPEUTIC EXERCISES: CPT

## 2025-02-14 PROCEDURE — 97161 PT EVAL LOW COMPLEX 20 MIN: CPT

## 2025-02-15 NOTE — PROGRESS NOTES
LOWER EXTREMITY EVALUATION:     Diagnosis:   Greater trochanteric pain syndrome of both lower extremities (M25.551,M25.552 Patient:  Samy Hoskins (69 year old, male)        Referring Provider: Peter Arteaga  Today's Date   2/15/2025    Precautions:  Other (use comment) (Diabetes, HTN)   Date of Evaluation: 02/14/25  Next MD visit: none set  Date of Surgery: NA     PATIENT SUMMARY   Summary of chief complaints: B lateral hip pain  History of current condition: Onset 4-5 years ago  on the L first without known incident. 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 The R hip started about 2 years ago. He had cortisone injections in B hips about 2 weeks ago and notes 75% relief. 4-5 months prior to the injections his tolerance for walking decreased from 45-60 min to 15 min with pain = 0-10/10. Prior to the injections he was unable to do reciprocal stair amb, but now he can do them reciprocally.   Pain level: current 2 /10, at best 0 /10, at worst 4 /10  Description of symptoms: sore, aching, sharp. 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 Still does  work.   Leisure activities/Hobbies: walking for exercise, house projects,    Prior level of function: walking for exercise 45-60 min's regularly with less pain.  Current limitations: walking limited to 15 min.  Pt goals: decrease pain, prevent pain from recurring  Past medical history was reviewed with Samy.  Significant findings include: 2 cardiac stents 7 years ago, insulin dependent diabetes, HTN  Imaging/Tests: xrays:Slight to moderate symmetric degenerative changes within both hips.   Samy  has a past medical history of Atherosclerosis of coronary artery, Deaf, right, Diabetes (HCC), Disorder of thyroid, Essential hypertension, High blood pressure, High cholesterol, Hyperlipidemia, and Thyroid disease.  He  has a past surgical history  that includes Rotator cuff repair (Left); coronary stent placement; and appendectomy (2012).    ASSESSMENT  Samy presents to physical therapy evaluation with primary c/o B lateral hip pain. The results of the objective tests and measures show Decreased B hip ROM all planes, decreased length B hamstrings and piriformis, Resisted testing B hip abd and ER weak and painful. Functional deficits include but are not limited to walking limited to 15 min.. Signs and symptoms are consistent with diagnosis of Greater trochanteric pain syndrome of both lower extremities (M25.551,M25.552. Pt and PT discussed evaluation findings, pathology, POC and HEP.  Pt voiced understanding and performs HEP correctly without reported pain. Skilled Physical Therapy is medically necessary to address the above impairments and reach functional goals.    OBJECTIVE:   Musculoskeletal  Palpation: TTP over lateral hips B; no significant increased tone or TTP over ITB     Special Tests:B Slump tests negaive     BRITTNEY ROM WNL and Strength (5/5) except below:  (* denotes performed with pain)  Hip   ROM MMT (-/5)    R L R L     Flex (L2) 115 115 5 5     Abd 25 30 4 * 4 *     ER 30 35 4 * 4 *     IR 10 10 5 5     ,   Knee   MMT (-/5)    R L     Flex 5 5     Ext (L3) 5 5     ,   Ankle/Foot   MMT (-/5)    R L     DF (L4) 5 5       Flexibility:  LE Flexibility R L     Hamstrings Significant decrease significant decrease     Piriformis  Mod decrease  Mod decrease       Balance and Functional Mobility:  Gait: pt ambulates on level ground with normal mechanics     Today's Treatment and Response:   Pt education was provided on exam findings, treatment diagnosis, treatment plan, expectations, and prognosis.  Today's Treatment       2/14/2025   PT Treatment   Therapeutic Exercise Self PROM ER B x 3  Piriformis stretch B x 3   Therapeutic Activity Pt inst in use of CP on lateral hips   Therapeutic Exercise Min 10   Evaluation Min 35   Total of Timed Procedures 10    Total of Service Based 35   Total Treatment Time 45         Patient was instructed in and issued a HEP for: PROM ER hooklying  Piriformis stretch hooklying    Charges:  PT EVAL: Low Complexity, Eval, Ther Ex 1  In agreement with evaluation findings and clinical rationale, this evaluation involved LOW COMPLEXITY decision making due to no personal factors/comorbidities, 1-2 body structures involved/activity limitations, and stable symptoms as documented in the evaluation.                                                                                                                PLAN OF CARE:    Goals: (to be met in 12 visits)    Therapy Goals    Pt will be ind a HEP for ROM, flexibility, and strengthening without provocation to reduce the risk of further irritation  Hip AROM will increase to ease of transfers improve hip joint mechanics  Strength of hip stabilizers will increase to increase tolerance for WB activities  Symptoms will decrease by 20%   Pt will be able to stand and walk longer         Frequency / Duration: Patient will be seen 1-2x/week or a total of 12  visits over a 90 day period. Treatment will include: Manual Therapy; Neuromuscular Re-education; Self-Care Home Management; Therapeutic Activities; Therapeutic Exercise; Home Exercise Program instruction; Ultrasound    Education or treatment limitation: None   Rehab Potential: good     Patient/Family/Caregiver was advised of these findings, precautions, and treatment options and has agreed to actively participate in planning and for this course of care.    Thank you for your referral. Please co-sign or sign and return this letter via fax as soon as possible to [phone]. If you have any questions, please contact me at Dept: [phone]    Sincerely,  Electronically signed by therapist: Mesha Crane, PT, S  Physician's certification required: Yes  I certify the need for these services furnished under this plan of treatment and while under my  care.    X___________________________________________________ Date____________________    Certification From: 2/15/2025  To:5/16/2025

## 2025-02-20 ENCOUNTER — TELEPHONE (OUTPATIENT)
Dept: INTERNAL MEDICINE CLINIC | Facility: CLINIC | Age: 70
End: 2025-02-20

## 2025-02-20 ENCOUNTER — OFFICE VISIT (OUTPATIENT)
Dept: PODIATRY CLINIC | Facility: CLINIC | Age: 70
End: 2025-02-20

## 2025-02-20 VITALS — HEIGHT: 71 IN | WEIGHT: 214 LBS | BODY MASS INDEX: 29.96 KG/M2

## 2025-02-20 DIAGNOSIS — B35.1 ONYCHOMYCOSIS: ICD-10-CM

## 2025-02-20 DIAGNOSIS — Z79.4 TYPE 2 DIABETES MELLITUS WITHOUT COMPLICATION, WITH LONG-TERM CURRENT USE OF INSULIN (HCC): Primary | ICD-10-CM

## 2025-02-20 DIAGNOSIS — E11.9 TYPE 2 DIABETES MELLITUS WITHOUT COMPLICATION, WITH LONG-TERM CURRENT USE OF INSULIN (HCC): Primary | ICD-10-CM

## 2025-02-20 PROCEDURE — 99213 OFFICE O/P EST LOW 20 MIN: CPT | Performed by: STUDENT IN AN ORGANIZED HEALTH CARE EDUCATION/TRAINING PROGRAM

## 2025-02-20 NOTE — PROGRESS NOTES
[facility] Podiatry  Progress Note      Samy Hoskins is a 69 year old male.   Chief Complaint   Patient presents with    Diabetic Foot Care     Nail care and foot check - last A1C= 8.8 from 01/29/25 - LOV was seen by PCP Frank - no c/o - some numbness and tingling - denies any pain              HPI:   Patient is a pleasant 69-year-old diabetic male presents to clinic for bilateral foot evaluation.  Admits to some numbness and tingling to lower extremity.  Denies any pedal injuries or trauma.  Denies any signs of infection.      Allergies: Shrimp extract allergy skin test and Shrimp    Current Outpatient Medications   Medication Sig Dispense Refill    levothyroxine 125 MCG Oral Tab Take 1 tablet (125 mcg total) by mouth before breakfast. 90 tablet 0    isosorbide mononitrate ER 30 MG Oral Tablet 24 Hr Take 1 tablet (30 mg total) by mouth daily. 90 tablet 0    ezetimibe 10 MG Oral Tab Take 1 tablet (10 mg total) by mouth nightly. 90 tablet 0    Continuous Glucose Sensor (FREESTYLE AJAY 3 SENSOR) Does not apply Misc Inject 1 each into the skin every 14 (fourteen) days. 6 each 3    Continuous Glucose  (FREESTYLE AJAY 2 READER) Does not apply Device Inject one each into the skin every fourteen days 6 each 3    clotrimazole 1 % External Solution Apply 1 Application topically 2 (two) times daily. 60 mL 3    aspirin 81 MG Oral Tab EC Take 1 tablet (81 mg total) by mouth daily. 90 tablet 3    insulin glargine (LANTUS SOLOSTAR) 100 UNIT/ML Subcutaneous Solution Pen-injector Inject 60 Units into the skin every morning. 60 mL 3    Insulin Lispro, 1 Unit Dial, 100 UNIT/ML Subcutaneous Solution Pen-injector Inject 25 Units into the skin 3 (three) times daily. Inject in the morning, at noon, and at bedtime 75 mL 0    rosuvastatin 20 MG Oral Tab Take 1 tablet (20 mg total) by mouth nightly. 90 tablet 3    Insulin Pen Needle (PEN NEEDLES) 32G X 4 MM Does not apply Misc Inject 1 Needle into the skin 4 (four) times  daily. For use with insulins 400 each 3    furosemide 40 MG Oral Tab Take 1 tablet (40 mg total) by mouth 2 (two) times daily. 180 tablet 3    Empagliflozin (JARDIANCE) 25 MG Oral Tab Take 25 mg by mouth daily. 90 tablet 2    amLODIPine 10 MG Oral Tab Take 1 tablet (10 mg total) by mouth daily. 90 tablet 3    losartan 25 MG Oral Tab Take 1 tablet (25 mg total) by mouth daily. 90 tablet 3      Past Medical History:    Atherosclerosis of coronary artery    Deaf, right    Diabetes (HCC)    Disorder of thyroid    Essential hypertension    High blood pressure    High cholesterol    Hyperlipidemia    Thyroid disease      Past Surgical History:   Procedure Laterality Date    Appendectomy  2012    Coronary stent placement      Rotator cuff repair Left       Family History   Problem Relation Age of Onset    Colon Cancer Neg     Colon Polyps Neg       Social History     Socioeconomic History    Marital status:    Tobacco Use    Smoking status: Former     Passive exposure: Never    Smokeless tobacco: Never   Vaping Use    Vaping status: Never Used   Substance and Sexual Activity    Alcohol use: Not Currently    Drug use: Never           REVIEW OF SYSTEMS:     Denies nause, fever, chills  No calf pain  Denies chest pain or SOB      EXAM:   Ht 5' 11\" (1.803 m)   Wt 214 lb (97.1 kg)   BMI 29.85 kg/m²   GENERAL: well developed, well nourished, in no apparent distress  EXTREMITIES:   1. Integument: Normal skin temperature and turgor. Toenails x 10 are slightly elongated and incuravated.   2. Vascular: Dorsalis pedis two out of four bilateral and posterior tibial pulses two out of   four bilateral, capillary refill normal.   3. Musculoskeletal: All muscle groups are graded 5 out of 5 in the foot and ankle.   4. Neurological: Normal sharp dull sensation; reflexes normal.             ASSESSMENT AND PLAN:   Diagnoses and all orders for this visit:    Type 2 diabetes mellitus without complication, with long-term current use of  insulin (HCC)    Onychomycosis        Plan:       -Patient examined, chart history reviewed.  -Discussed importance of proper pedal hygiene, regular foot checks, and tight glucose control.  -Sharply debrided nails x10 with a sterile nail nipper achieving a 20% reduction in thickness and length, without incident.   -Ambulate with supportive shoes and inserts and avoid walking barefoot.  -Educated patient on acute signs of infection advised patient to seek immediate medical attention if symptoms arise.    RTC in 3 months      The patient indicates understanding of these issues and agrees to the plan.        Marilou Gudino DPM

## 2025-02-21 ENCOUNTER — OFFICE VISIT (OUTPATIENT)
Dept: PHYSICAL THERAPY | Facility: HOSPITAL | Age: 70
End: 2025-02-21
Attending: PHYSICAL MEDICINE & REHABILITATION
Payer: MEDICAID

## 2025-02-21 PROCEDURE — 97110 THERAPEUTIC EXERCISES: CPT

## 2025-02-21 NOTE — PROGRESS NOTES
Patient: Samy Hoskins (69 year old, male) Referring Provider:  Insurance:   Diagnosis: Greater trochanteric pain syndrome of both lower extremities (M25.551,M25.552 [place] MEDICAID   Date of Surgery: NA Next MD visit:  N/A   Precautions:  Other (use comment) (Diabetes, HTN) none set Referral Information:    Date of Evaluation: Req: 0, Auth: 0, Exp:     02/14/25 POC Auth Visits:  12       Today's Date   2/21/2025    Subjective  Pt states he can walk further now, and he just walked 1.25 hours. He does note mild pain after walking.in B lateral hips. Last night he had some pain lying in bed in the L hip.       Pain: 4/10     Objective  Rx: see flow chart        Assessment  Pt responding well to cryotherapy and stretching with some decrease in pain and increase in roldan for amb.  Active abd sidelying provoking in B hips.    Goals (to be met in 12 visits)   Goals                   4/25/24    3/7/24     EDC - Problem Solving (pt-stated)   Frequently  Frequently     I will carry treatment for low blood sugar with me at all times       EDC - Medications (pt-stated)           I will take long acting insulin as instructed every day.       Therapy Goals          Pt will be ind a HEP for ROM, flexibility, and strengthening without provocation to reduce the risk of further irritation  Hip AROM will increase to ease of transfers improve hip joint mechanics  Strength of hip stabilizers will increase to increase tolerance for WB activities  Symptoms will decrease by 20%   Pt will be able to stand and walk longer              Plan  review and monitor response to clam and isometric abd.    Treatment Last 4 Visits       2/14/2025 2/21/2025   PT Treatment   Treatment Day  2   Therapeutic Exercise Self PROM ER B x 3  Piriformis stretch B x 3 All ex's done B  Piriformis stretch seated and hooklying x 3 each  Hip ER self-PROM seated and hooklying x 3 each  Bridge x 10  Clam x 10  Abd sidelying x 5 L; x 3 R due to pain  Standing hip  flexor stretch  Isometric abd seated with blue band   Therapeutic Activity Pt inst in use of CP on lateral hips    Therapeutic Exercise Min 10 45   Evaluation Min 35    Total of Timed Procedures 10 45   Total of Service Based 35 0   Total Treatment Time 45 45         HEP  Piriformis stretch sit or supine  PROM ER sit or supine  Standing hip flexor stretch  Clam  Isometric abd seated with blue band 5 sec hold x 10    Charges     Ther Ex 3

## 2025-02-28 ENCOUNTER — OFFICE VISIT (OUTPATIENT)
Dept: PHYSICAL THERAPY | Facility: HOSPITAL | Age: 70
End: 2025-02-28
Attending: PHYSICAL MEDICINE & REHABILITATION
Payer: MEDICAID

## 2025-02-28 PROCEDURE — 97110 THERAPEUTIC EXERCISES: CPT

## 2025-02-28 NOTE — PROGRESS NOTES
Patient: Samy Hoskins (69 year old, male) Referring Provider:  Insurance:   Diagnosis: Greater trochanteric pain syndrome of both lower extremities (M25.551,M25.552 [employer] [place] MEDICAID   Date of Surgery: NA Next MD visit:  N/A   Precautions:  Other (use comment) (Diabetes, HTN) none set Referral Information:    Date of Evaluation: Req: 0, Auth: 0, Exp:     02/14/25 POC Auth Visits:  12       Today's Date   2/28/2025    Subjective  Pt states he has been sick with a respiratory flu for the past week. He notes some further improvement relative to SOC with less pain with walking. He has not been able to walk much for exercise due to being sick       Pain: 4/10     Objective  Rx: see flow chart         Assessment  able to tolerate sidelying abd today without provocation    Goals (to be met in 12 visits)   Goals                   4/25/24    3/7/24     EDC - Problem Solving (pt-stated)   Frequently  Frequently     I will carry treatment for low blood sugar with me at all times       EDC - Medications (pt-stated)           I will take long acting insulin as instructed every day.       Therapy Goals          Pt will be ind a HEP for ROM, flexibility, and strengthening without provocation to reduce the risk of further irritation  Hip AROM will increase to ease of transfers improve hip joint mechanics  Strength of hip stabilizers will increase to increase tolerance for WB activities  Symptoms will decrease by 20%   Pt will be able to stand and walk longer              Plan  monistor response to abd and ITB stretch and review both    Treatment Last 4 Visits       2/14/2025 2/21/2025 2/28/2025   PT Treatment   Treatment Day  2 3   Therapeutic Exercise Self PROM ER B x 3  Piriformis stretch B x 3 All ex's done B  Piriformis stretch seated and hooklying x 3 each  Hip ER self-PROM seated and hooklying x 3 each  Bridge x 10  Clam x 10  Abd sidelying x 5 L; x 3 R due to pain  Standing hip flexor stretch  Isometric abd seated with  blue band All done (B)  Standing hip flexor stretch  Standing ITB stretch  Self PROM ER  Piriformis stretch  Man resisted hip IR seated x 10  Sidelying abd 5x2  PROM IR prone with stab x 3   Therapeutic Activity Pt inst in use of CP on lateral hips     Therapeutic Exercise Min 10 45 40   Evaluation Min 35     Total of Timed Procedures 10 45 40   Total of Service Based 35 0 0   Total Treatment Time 45 45 40         HEP  ITB stretch  Sidelying abd    Charges           There Ex 3

## 2025-03-05 ENCOUNTER — OFFICE VISIT (OUTPATIENT)
Dept: PHYSICAL THERAPY | Facility: HOSPITAL | Age: 70
End: 2025-03-05
Attending: PHYSICAL MEDICINE & REHABILITATION
Payer: MEDICAID

## 2025-03-05 PROCEDURE — 97110 THERAPEUTIC EXERCISES: CPT

## 2025-03-05 PROCEDURE — 97035 APP MDLTY 1+ULTRASOUND EA 15: CPT

## 2025-03-06 ENCOUNTER — OFFICE VISIT (OUTPATIENT)
Dept: INTERNAL MEDICINE CLINIC | Facility: CLINIC | Age: 70
End: 2025-03-06

## 2025-03-06 VITALS
HEART RATE: 76 BPM | SYSTOLIC BLOOD PRESSURE: 126 MMHG | OXYGEN SATURATION: 99 % | RESPIRATION RATE: 17 BRPM | WEIGHT: 213 LBS | DIASTOLIC BLOOD PRESSURE: 84 MMHG | TEMPERATURE: 98 F | HEIGHT: 71 IN | BODY MASS INDEX: 29.82 KG/M2

## 2025-03-06 DIAGNOSIS — I10 PRIMARY HYPERTENSION: ICD-10-CM

## 2025-03-06 DIAGNOSIS — Z79.4 TYPE 2 DIABETES MELLITUS WITH HYPERGLYCEMIA, WITH LONG-TERM CURRENT USE OF INSULIN (HCC): Primary | ICD-10-CM

## 2025-03-06 DIAGNOSIS — R05.2 SUBACUTE COUGH: ICD-10-CM

## 2025-03-06 DIAGNOSIS — E78.2 MIXED HYPERLIPIDEMIA: ICD-10-CM

## 2025-03-06 DIAGNOSIS — E11.65 TYPE 2 DIABETES MELLITUS WITH HYPERGLYCEMIA, WITH LONG-TERM CURRENT USE OF INSULIN (HCC): Primary | ICD-10-CM

## 2025-03-06 PROCEDURE — 99214 OFFICE O/P EST MOD 30 MIN: CPT | Performed by: INTERNAL MEDICINE

## 2025-03-06 RX ORDER — BENZONATATE 100 MG/1
100 CAPSULE ORAL 3 TIMES DAILY PRN
Qty: 20 CAPSULE | Refills: 0 | Status: SHIPPED | OUTPATIENT
Start: 2025-03-06 | End: 2025-03-13

## 2025-03-06 RX ORDER — INSULIN GLARGINE 100 [IU]/ML
60 INJECTION, SOLUTION SUBCUTANEOUS EVERY MORNING
Qty: 60 ML | Refills: 3 | Status: SHIPPED | OUTPATIENT
Start: 2025-03-06

## 2025-03-06 RX ORDER — INSULIN LISPRO 100 [IU]/ML
25 INJECTION, SOLUTION INTRAVENOUS; SUBCUTANEOUS 3 TIMES DAILY
Qty: 75 ML | Refills: 0 | OUTPATIENT
Start: 2025-03-06

## 2025-03-06 RX ORDER — INSULIN LISPRO 100 [IU]/ML
25 INJECTION, SOLUTION INTRAVENOUS; SUBCUTANEOUS 3 TIMES DAILY
Qty: 75 ML | Refills: 0 | Status: SHIPPED | OUTPATIENT
Start: 2025-03-06

## 2025-03-06 NOTE — TELEPHONE ENCOUNTER
Pt's wife calling for Insulins, stated he's out ,   LOV /Labs 1/28/25  Advised last Result message - advised appt/made      Hba1c is  8.8 , slightly better then before , but still high , he needs to schedule barbie with pcp to adjust medications, and also will need to see endocrinology   Written by Katelin Braun CMA on 1/29/2025  1:03 PM CST  Seen by patient Samy Hoskins on 1/29/2025  1:09 PM

## 2025-03-06 NOTE — PROGRESS NOTES
Subjective:     Patient ID: Samy Hoskins is a 69 year old male.    Patient comes in today for follow-up recent labs showed A1c 8.8 improved from before but still elevated patient says he tries to watch his diet has seen diabetic educator last year has not seen endocrinologist since last year.  Patient also complains today of cough been going on for about 2 weeks now initially when the symptoms started he was more sick now is feeling better but lingering cough continues no fever no chills        History/Other:   Review of Systems   Constitutional: Negative.  Negative for fatigue and fever.   HENT: Negative.  Negative for congestion.    Eyes: Negative.    Respiratory:  Positive for cough. Negative for shortness of breath and wheezing.    Cardiovascular: Negative.  Negative for chest pain, palpitations and leg swelling.   Gastrointestinal: Negative.    Endocrine: Negative for cold intolerance and heat intolerance.   Genitourinary: Negative.  Negative for dysuria, flank pain and hematuria.   Musculoskeletal: Negative.  Negative for arthralgias, back pain and myalgias.   Skin: Negative.    Neurological: Negative.  Negative for dizziness, tremors, syncope, weakness and headaches.   Psychiatric/Behavioral: Negative.  Negative for agitation, behavioral problems and suicidal ideas. The patient is not nervous/anxious.      Current Outpatient Medications   Medication Sig Dispense Refill    insulin glargine (LANTUS SOLOSTAR) 100 UNIT/ML Subcutaneous Solution Pen-injector Inject 60 Units into the skin every morning. 60 mL 3    Insulin Lispro, 1 Unit Dial, 100 UNIT/ML Subcutaneous Solution Pen-injector Inject 25 Units into the skin 3 (three) times daily. Inject in the morning, at noon, and at bedtime 75 mL 0    benzonatate 100 MG Oral Cap Take 1 capsule (100 mg total) by mouth 3 (three) times daily as needed for cough. 20 capsule 0    levothyroxine 125 MCG Oral Tab Take 1 tablet (125 mcg total) by mouth before breakfast. 90  tablet 0    isosorbide mononitrate ER 30 MG Oral Tablet 24 Hr Take 1 tablet (30 mg total) by mouth daily. 90 tablet 0    ezetimibe 10 MG Oral Tab Take 1 tablet (10 mg total) by mouth nightly. 90 tablet 0    Continuous Glucose Sensor (FREESTYLE AJAY 3 SENSOR) Does not apply Misc Inject 1 each into the skin every 14 (fourteen) days. 6 each 3    Continuous Glucose  (FREESTYLE AJAY 2 READER) Does not apply Device Inject one each into the skin every fourteen days 6 each 3    clotrimazole 1 % External Solution Apply 1 Application topically 2 (two) times daily. 60 mL 3    aspirin 81 MG Oral Tab EC Take 1 tablet (81 mg total) by mouth daily. 90 tablet 3    rosuvastatin 20 MG Oral Tab Take 1 tablet (20 mg total) by mouth nightly. 90 tablet 3    Insulin Pen Needle (PEN NEEDLES) 32G X 4 MM Does not apply Misc Inject 1 Needle into the skin 4 (four) times daily. For use with insulins 400 each 3    furosemide 40 MG Oral Tab Take 1 tablet (40 mg total) by mouth 2 (two) times daily. 180 tablet 3    Empagliflozin (JARDIANCE) 25 MG Oral Tab Take 25 mg by mouth daily. 90 tablet 2    amLODIPine 10 MG Oral Tab Take 1 tablet (10 mg total) by mouth daily. 90 tablet 3    losartan 25 MG Oral Tab Take 1 tablet (25 mg total) by mouth daily. 90 tablet 3     Allergies:Allergies[1]    Past Medical History:    Atherosclerosis of coronary artery    Deaf, right    Diabetes (HCC)    Disorder of thyroid    Essential hypertension    High blood pressure    High cholesterol    Hyperlipidemia    Thyroid disease      Past Surgical History:   Procedure Laterality Date    Appendectomy  2012    Coronary stent placement      Rotator cuff repair Left       Family History   Problem Relation Age of Onset    Colon Cancer Neg     Colon Polyps Neg       Social History:   Social History     Socioeconomic History    Marital status:    Tobacco Use    Smoking status: Former     Passive exposure: Never    Smokeless tobacco: Never   Vaping Use    Vaping  status: Never Used   Substance and Sexual Activity    Alcohol use: Not Currently    Drug use: Never     Social Drivers of Health     Food Insecurity: Patient Declined (2/23/2025)    Received from [facility]    Hunger Vital Sign     Worried About Running Out of Food in the Last Year: Patient declined     Ran Out of Food in the Last Year: Patient declined   Transportation Needs: Patient Declined (2/23/2025)    Received from [facility]    PRAPARE - Transportation     Lack of Transportation (Medical): Patient declined     Lack of Transportation (Non-Medical): Patient declined   Housing Stability: Unknown (2/23/2025)    Received from [facility]    Housing Stability Vital Sign     Unable to Pay for Housing in the Last Year: Patient declined        Objective:   Physical Exam  Vitals and nursing note reviewed.   Constitutional:       Appearance: He is well-developed.   HENT:      Head: Normocephalic and atraumatic.      Right Ear: External ear normal.      Left Ear: External ear normal.      Nose: Nose normal.   Eyes:      Conjunctiva/sclera: Conjunctivae normal.      Pupils: Pupils are equal, round, and reactive to light.   Cardiovascular:      Rate and Rhythm: Normal rate and regular rhythm.      Heart sounds: Normal heart sounds.   Pulmonary:      Effort: Pulmonary effort is normal.      Breath sounds: Normal breath sounds.   Abdominal:      General: Bowel sounds are normal.      Palpations: Abdomen is soft.   Genitourinary:     Penis: Normal.       Prostate: Normal.      Rectum: Normal.   Musculoskeletal:         General: Normal range of motion.      Cervical back: Normal range of motion and neck supple.   Skin:     General: Skin is warm and dry.   Neurological:      Mental Status: He is alert and oriented to person, place, and time.      Deep Tendon Reflexes: Reflexes are normal and symmetric.         Assessment & Plan:   1. Type 2 diabetes mellitus with  hyperglycemia, with long-term current use of insulin (HCC) follow-up with endocrine watch diet closely take medication use the insulin as prescribed   2. Primary hypertension continue current treatment   3. Subacute cough lungs clear vital stable Haley medication for cough monitor symptoms   4. Mixed hyperlipidemia continue current treatment watch diet       No orders of the defined types were placed in this encounter.      Meds This Visit:  Requested Prescriptions     Signed Prescriptions Disp Refills    insulin glargine (LANTUS SOLOSTAR) 100 UNIT/ML Subcutaneous Solution Pen-injector 60 mL 3     Sig: Inject 60 Units into the skin every morning.    Insulin Lispro, 1 Unit Dial, 100 UNIT/ML Subcutaneous Solution Pen-injector 75 mL 0     Sig: Inject 25 Units into the skin 3 (three) times daily. Inject in the morning, at noon, and at bedtime    benzonatate 100 MG Oral Cap 20 capsule 0     Sig: Take 1 capsule (100 mg total) by mouth 3 (three) times daily as needed for cough.       Imaging & Referrals:  ENDOCRINOLOGY - INTERNAL            [1]   Allergies  Allergen Reactions    Shrimp Extract Allergy Skin Test ITCHING     shrimp allergenic extract    Shrimp OTHER (SEE COMMENTS)     Throat tightness

## 2025-03-06 NOTE — PROGRESS NOTES
Patient: Samy Hoskins (69 year old, male) Referring Provider:  Insurance:   Diagnosis: Greater trochanteric pain syndrome of both lower extremities (M25.551,M25.552 [place] MEDICAID   Date of Surgery: NA Next MD visit:  N/A   Precautions:  HTN, Diabetes none set Referral Information:    Date of Evaluation: Req: 0, Auth: 0, Exp:     02/14/25 POC Auth Visits:  12       Today's Date   3/5/2025    Subjective  Pt states he is still not feeling well with a cough and fatigue. He notes a slight improvement in his B lateral hip pain. He states he is roldan his HEP well.       Pain: 4/10     Objective  Rx: see flow chart         Assessment  Pt able to perform all ex's without increased pain.    Goals (to be met in 12 visits)   Therapy Goals     Pt will be ind a HEP for ROM, flexibility, and strengthening without provocation to reduce the risk of further irritation  Hip AROM will increase to ease of transfers improve hip joint mechanics  Strength of hip stabilizers will increase to increase tolerance for WB activities  Symptoms will decrease by 20%   Pt will be able to stand and walk longer           Plan  Review ITB stretch and isometric abd in standing    Treatment Last 4 Visits       2/14/2025 2/21/2025 2/28/2025 3/5/2025   PT Treatment   Treatment Day  2 3 4   Therapeutic Exercise Self PROM ER B x 3  Piriformis stretch B x 3 All ex's done B  Piriformis stretch seated and hooklying x 3 each  Hip ER self-PROM seated and hooklying x 3 each  Bridge x 10  Clam x 10  Abd sidelying x 5 L; x 3 R due to pain  Standing hip flexor stretch  Isometric abd seated with blue band All done (B)  Standing hip flexor stretch  Standing ITB stretch  Self PROM ER  Piriformis stretch  Man resisted hip IR seated x 10  Sidelying abd 5x2  PROM IR prone with stab x 3    Therapeutic Activity Pt inst in use of CP on lateral hips      Therapeutic Exercise Min 10 45 40    Evaluation Min 35      Total of Timed Procedures 10 45 40    Total of Service  Based 35 0 0    Total Treatment Time 45 45 40           2/21/2025 2/28/2025 3/5/2025   LE Treatment   Treatment Day 2 3 4   Therapeutic Exercise   Man resisted IR/ER seated (B) 10x2  Isometric abd standing x 10  HEP review and progression   Modalities   US 1.0 W/cm2 at 50% to B lateral hips supine x 10   Therapeutic Exercise Minutes   30   Ultrasound Minutes   10   Total Time Of Timed Procedures   40   Total Time Of Service-Based Procedures   0   Total Treatment Time   40   HEP   Isometric abd standing        HEP  Isometric abd standing    Charges      There Ex 2, US

## 2025-03-14 ENCOUNTER — OFFICE VISIT (OUTPATIENT)
Dept: PHYSICAL THERAPY | Facility: HOSPITAL | Age: 70
End: 2025-03-14
Attending: PHYSICAL MEDICINE & REHABILITATION
Payer: MEDICAID

## 2025-03-14 PROCEDURE — 97530 THERAPEUTIC ACTIVITIES: CPT

## 2025-03-14 PROCEDURE — 97110 THERAPEUTIC EXERCISES: CPT

## 2025-03-14 PROCEDURE — 97035 APP MDLTY 1+ULTRASOUND EA 15: CPT

## 2025-03-15 NOTE — PROGRESS NOTES
Patient: Samy Hoskins (69 year old, male) Referring Provider:  Insurance:   Diagnosis: Greater trochanteric pain syndrome of both lower extremities (M25.551,M25.552 [place]  MEDICAID   Date of Surgery: NA Next MD visit:  N/A   Precautions:  Other (use comment) none set Referral Information:    Date of Evaluation: Req: 0, Auth: 0, Exp:     02/14/25 POC Auth Visits:  12       Today's Date   3/14/2025    Subjective  Pt states his B hips feel significantly better. He walked 1.5 hours today and had pain = 3-4/10. He is generallky no longer having pain at night.       Pain:       Objective  Decreased TTP in B lateral hips with L slighlty more TTP than R              Assessment  Pt progressing well at this time    Goals (to be met in 12 visits)   Therapy Goals     Pt will be ind a HEP for ROM, flexibility, and strengthening without provocation to reduce the risk of further irritation  Hip AROM will increase to ease of transfers improve hip joint mechanics  Strength of hip stabilizers will increase to increase tolerance for WB activities  Symptoms will decrease by 20%   Pt will be able to stand and walk longer               Plan  Decrease frequency to once/2 wks due to pt's progress.    Treatment Last 4 Visits       2/21/2025 2/28/2025 3/5/2025 3/14/2025   PT Treatment   Treatment Day 2 3 4 5   Therapeutic Exercise All ex's done B  Piriformis stretch seated and hooklying x 3 each  Hip ER self-PROM seated and hooklying x 3 each  Bridge x 10  Clam x 10  Abd sidelying x 5 L; x 3 R due to pain  Standing hip flexor stretch  Isometric abd seated with blue band All done (B)  Standing hip flexor stretch  Standing ITB stretch  Self PROM ER  Piriformis stretch  Man resisted hip IR seated x 10  Sidelying abd 5x2  PROM IR prone with stab x 3     Therapeutic Exercise Min 45 40     Total of Timed Procedures 45 40     Total of Service Based 0 0     Total Treatment Time 45 40            2/21/2025 2/28/2025 3/5/2025 3/14/2025   [facility]  Treatment   Treatment Day 2 3 4 5   Therapeutic Exercise   Man resisted IR/ER seated (B) 10x2  Isometric abd standing x 10  HEP review and progression Standing hip flexor stretch  Standing ITB stretch into wall  Clam x 10  Abd x 10   Therapeutic Activity    Review of use of stretching and CP following walking   Modalities   US 1.0 W/cm2 at 50% to B lateral hips supine x 10 US 1.0 W/cm2 @ 50% to B lat hips x 5 min each   Therapeutic Exercise Minutes   30 25   Therapeutic Activity Minutes    10   Ultrasound Minutes   10 10   Total Time Of Timed Procedures   40 45   Total Time Of Service-Based Procedures   0 0   Total Treatment Time   40 45   HEP   Isometric abd standing ITB stretch standing        HEP  ITB stretch standing    Charges     Ther Ex 2, Ther Act 1, US

## 2025-03-21 ENCOUNTER — APPOINTMENT (OUTPATIENT)
Dept: PHYSICAL THERAPY | Facility: HOSPITAL | Age: 70
End: 2025-03-21
Attending: PHYSICAL MEDICINE & REHABILITATION
Payer: MEDICAID

## 2025-03-26 ENCOUNTER — TELEPHONE (OUTPATIENT)
Dept: PHYSICAL THERAPY | Facility: HOSPITAL | Age: 70
End: 2025-03-26

## 2025-03-26 ENCOUNTER — APPOINTMENT (OUTPATIENT)
Dept: PHYSICAL THERAPY | Facility: HOSPITAL | Age: 70
End: 2025-03-26
Attending: PHYSICAL MEDICINE & REHABILITATION
Payer: MEDICAID

## 2025-03-26 NOTE — TELEPHONE ENCOUNTER
Left voice mail to pt not showing for his PT appt this afternoon. Requested return call confirming his next appointment. Provided my direct line.

## 2025-03-27 ENCOUNTER — TELEPHONE (OUTPATIENT)
Dept: PHYSICAL THERAPY | Facility: HOSPITAL | Age: 70
End: 2025-03-27

## 2025-03-27 NOTE — TELEPHONE ENCOUNTER
Called pt back following voice mail request from his wife. Confirmed his next 2 PT appts. Pt voiced agreement.

## 2025-04-04 ENCOUNTER — OFFICE VISIT (OUTPATIENT)
Dept: PHYSICAL THERAPY | Facility: HOSPITAL | Age: 70
End: 2025-04-04
Attending: PHYSICAL MEDICINE & REHABILITATION
Payer: MEDICAID

## 2025-04-04 PROCEDURE — 97110 THERAPEUTIC EXERCISES: CPT

## 2025-04-05 NOTE — PROGRESS NOTES
Discharge Summary  Pt has attended 6 visits in Physical Therapy from 2/14 - 4/4/2025       Patient: Samy Hoskins (70 year old, male) Referring Provider:  Insurance:   Diagnosis: Greater trochanteric pain syndrome of both lower extremities (M25.551,M25.552 [place]  MEDICAID   Date of Surgery: NA Next MD visit:  N/A   Precautions:  Diabetes, HTN  4/14/25 Referral Information:    Date of Evaluation: Req: 0, Auth: 0, Exp:     2/14/25 POC Auth Visits:  12       Today's Date   4/4/2025    Subjective  Pt reports 90% improvement overall. He has been able to increase his walking from 15-20 minutes to 1.25 hours with temporary pain in B lateral hips = 3-4/10.  Pt states he is no longer having pain in bed at night. He states he feels symptoms are not interfering with function and are now manageable.       Pain: 2/10     Objective  Pt has been seen 1x/wk for US, stretching, strengthening, HEP inst.       Strength B hip abd and ER increased from 4 to 5/5.  Localized min TTP persists over lat hip but has decreased       Assessment  Pt resonded well with a progressive decrease in pain, increased strength, and increased roldan for amb    Goals (to be met in 12 visits)   Therapy Goals     Pt will be ind a HEP for ROM, flexibility, and strengthening without provocation to reduce the risk of further irritation - MET  Hip AROM will increase to ease of transfers improve hip joint mechanics - MET  Strength of hip stabilizers will increase to increase tolerance for WB activities - MET  Symptoms will decrease by 20% - MET  Pt will be able to stand and walk longer - MET           Plan  Pt d/c'ed from PT with HEP due to meeting his goals for therapy.    Treatment Last 4 Visits  Treatment Day: 6       3/5/2025 3/14/2025 4/4/2025   LE Treatment   Therapeutic Exercise Man resisted IR/ER seated (B) 10x2  Isometric abd standing x 10  HEP review and progression Standing hip flexor stretch  Standing ITB stretch into wall  Clam x 10  Abd x 10  Abd sidelying x 10  ER sidelying x 10  Review of standing hip flexor and ITB stretch  HEP review   Therapeutic Activity  Review of use of stretching and CP following walking    Modalities US 1.0 W/cm2 at 50% to B lateral hips supine x 10 US 1.0 W/cm2 @ 50% to B lat hips x 5 min each US 1.0 W/cm2 at 50% to lat hips   Therapeutic Exercise Minutes 30 25 38   Therapeutic Activity Minutes  10    Ultrasound Minutes 10 10 10   Total Time Of Timed Procedures 40 45 48   Total Time Of Service-Based Procedures 0 0 0   Total Treatment Time 40 45 48   HEP Isometric abd standing ITB stretch standing         HEP  ITB stretch standing    Charges     Ther Ex 3

## 2025-04-11 ENCOUNTER — APPOINTMENT (OUTPATIENT)
Dept: PHYSICAL THERAPY | Facility: HOSPITAL | Age: 70
End: 2025-04-11
Attending: PHYSICAL MEDICINE & REHABILITATION
Payer: MEDICAID

## 2025-04-14 ENCOUNTER — OFFICE VISIT (OUTPATIENT)
Dept: PHYSICAL MEDICINE AND REHAB | Facility: CLINIC | Age: 70
End: 2025-04-14
Payer: MEDICAID

## 2025-04-14 VITALS
DIASTOLIC BLOOD PRESSURE: 76 MMHG | BODY MASS INDEX: 29.82 KG/M2 | SYSTOLIC BLOOD PRESSURE: 118 MMHG | HEIGHT: 71 IN | WEIGHT: 213 LBS | OXYGEN SATURATION: 96 % | HEART RATE: 79 BPM

## 2025-04-14 DIAGNOSIS — M16.0 BILATERAL HIP JOINT ARTHRITIS: ICD-10-CM

## 2025-04-14 DIAGNOSIS — M25.551 GREATER TROCHANTERIC PAIN SYNDROME OF BOTH LOWER EXTREMITIES: Primary | ICD-10-CM

## 2025-04-14 DIAGNOSIS — M25.552 GREATER TROCHANTERIC PAIN SYNDROME OF BOTH LOWER EXTREMITIES: Primary | ICD-10-CM

## 2025-04-14 PROCEDURE — 99213 OFFICE O/P EST LOW 20 MIN: CPT | Performed by: PHYSICAL MEDICINE & REHABILITATION

## 2025-04-14 NOTE — PROGRESS NOTES
The following individual(s) verbally consented to be recorded using ambient AI listening technology and understand that they can each withdraw their consent to this listening technology at any point by asking the clinician to turn off or pause the recording:    Patient name: Samy Hoskins

## 2025-04-14 NOTE — PROGRESS NOTES
RETURN PATIENT VISIT    CHIEF COMPLAINT  Greater trochanteric pain syndrome follow-up    INTERVAL HISTORY  Samy Hoskins is a 70 year old who was last seen in clinic on 1/22/2025 following up on bilateral greater trochanteric bursa injections, overall documents 70 % improvement.  Continues to endorse some bilateral hip pain which radiates into the low back, currently pain is rated 4/10.  Has numbness and tingling and weakness, currently using Tylenol and has been working in physical therapy with some relief.  History of Present Illness  The patient, with a history of chronic pain, presents for a follow-up visit after receiving therapeutic injections. He reports significant improvement in his pain and mobility. Previously, he could only walk for 10-15 minutes before needing to stop due to pain. Now, he can walk for an hour before experiencing discomfort. The pain, rated at a 4-5, is located in the hips and back. He is currently undergoing physical therapy, focusing on strength and stability. He also reports occasional pain in the hips, but it is not as severe as before. He has been doing exercises provided by his therapist and managing any residual pain with ice packs and over-the-counter analgesics like Tylenol. He reports improved sleep and overall quality of life, although he acknowledges he is not yet 100% pain-free.      REVIEW OF SYSTEMS  Review of systems was completed with the patient today as pertinent to today's visit    PHYSICAL EXAMINATION  CONSTITUTIONAL: Well-appearing, in no apparent distress  EYES: No scleral icterus or conjunctival hemorrhage  CARDIOVASCULAR: Skin warm and well-perfused, no peripheral edema  RESPIRATORY: Breathing unlabored without accessory muscle use  PSYCHIATRIC: Alert, cooperative, appropriate mood and affect  SKIN: No lesions or rashes on exposed skin  MUSCULOSKELETAL:   Improved gait, improved range of motion lumbar spine, mild tenderness palpation of the gluteal muscles and  gluteal tendons bilaterally.    REVIEW OF PRIOR X-RAYS/STUDIES  No new imaging to review    IMPRESSION/DIAGNOSIS  1.   Encounter Diagnoses   Name Primary?    Greater trochanteric pain syndrome of both lower extremities Yes    Bilateral hip joint arthritis          TREATMENT/PLAN  Assessment & Plan  Greater trochanteric pain syndrome of both lower extremities  Significant symptom improvement post-injection. Pain rated 4-5/10 after prolonged activity. Emphasis on functional improvement and gluteal muscle strengthening.  - Continue physical therapy focusing on strength and stability.  - Use ice packs and Tylenol for pain management as needed.  - Encourage exercises to strengthen gluteal muscles.  - Consider repeating injection if symptoms worsen or become limiting.    Bilateral hip joint arthritis  Intermittent hip pain improved post-intervention, manageable without significant interference in daily activities.  - Continue exercises and therapy to maintain and improve joint function.      Education was provided regarding the above impression/diagnosis and treatment options/plan were discussed.  All questions were answered during today's visit.  Patient will contact clinic if any other questions or concerns.          Peter Arteaga DO  Interventional Spine and Sports Medicine Specialist   Physical Medicine and Rehabilitation  [place] [id number] [place] [id number]    [address] [place] [address]

## 2025-04-23 RX ORDER — LEVOTHYROXINE SODIUM 125 UG/1
125 TABLET ORAL
Qty: 90 TABLET | Refills: 3 | Status: SHIPPED | OUTPATIENT
Start: 2025-04-23

## 2025-04-29 ENCOUNTER — OFFICE VISIT (OUTPATIENT)
Dept: INTERNAL MEDICINE CLINIC | Facility: CLINIC | Age: 70
End: 2025-04-29

## 2025-04-29 VITALS
HEART RATE: 68 BPM | OXYGEN SATURATION: 96 % | SYSTOLIC BLOOD PRESSURE: 126 MMHG | BODY MASS INDEX: 29.96 KG/M2 | DIASTOLIC BLOOD PRESSURE: 70 MMHG | RESPIRATION RATE: 17 BRPM | TEMPERATURE: 98 F | WEIGHT: 214 LBS | HEIGHT: 71 IN

## 2025-04-29 DIAGNOSIS — R60.0 BILATERAL LEG EDEMA: ICD-10-CM

## 2025-04-29 DIAGNOSIS — E11.65 TYPE 2 DIABETES MELLITUS WITH HYPERGLYCEMIA, WITH LONG-TERM CURRENT USE OF INSULIN (HCC): ICD-10-CM

## 2025-04-29 DIAGNOSIS — Z00.00 ANNUAL PHYSICAL EXAM: Primary | ICD-10-CM

## 2025-04-29 DIAGNOSIS — I25.10 CORONARY ARTERY DISEASE INVOLVING NATIVE CORONARY ARTERY OF NATIVE HEART WITHOUT ANGINA PECTORIS: ICD-10-CM

## 2025-04-29 DIAGNOSIS — I10 PRIMARY HYPERTENSION: ICD-10-CM

## 2025-04-29 DIAGNOSIS — T88.7XXA MEDICATION SIDE EFFECT: ICD-10-CM

## 2025-04-29 DIAGNOSIS — Z79.4 TYPE 2 DIABETES MELLITUS WITH HYPERGLYCEMIA, WITH LONG-TERM CURRENT USE OF INSULIN (HCC): ICD-10-CM

## 2025-04-29 DIAGNOSIS — H26.9 CATARACT OF BOTH EYES, UNSPECIFIED CATARACT TYPE: ICD-10-CM

## 2025-04-29 DIAGNOSIS — E78.5 DYSLIPIDEMIA: ICD-10-CM

## 2025-04-29 RX ORDER — LOSARTAN POTASSIUM 50 MG/1
50 TABLET ORAL DAILY
Qty: 30 TABLET | Refills: 2 | Status: SHIPPED | OUTPATIENT
Start: 2025-04-29

## 2025-05-04 NOTE — PROGRESS NOTES
Subjective:     Patient ID: Samy Hoskins is a 70 year old male.    Patient comes in for annual physical complaints.  Bilateral feet ankle swelling sometimes goes up to the knees has been going on for few weeks patient is on amlodipine 5.  Today the swelling not so bad no chest pain or shortness of breath        History/Other:   Review of Systems   Constitutional: Negative.  Negative for fatigue and fever.   HENT: Negative.  Negative for congestion.    Eyes: Negative.    Respiratory: Negative.  Negative for cough, shortness of breath and wheezing.    Cardiovascular:  Positive for leg swelling. Negative for chest pain and palpitations.   Gastrointestinal: Negative.    Endocrine: Negative for cold intolerance and heat intolerance.   Genitourinary: Negative.  Negative for dysuria, flank pain and hematuria.   Musculoskeletal: Negative.  Negative for arthralgias, back pain and myalgias.   Skin: Negative.    Neurological: Negative.  Negative for dizziness, tremors, syncope, weakness and headaches.   Psychiatric/Behavioral: Negative.  Negative for agitation, behavioral problems and suicidal ideas. The patient is not nervous/anxious.      Current Medications[1]  Allergies:Allergies[2]    Past Medical History[3]   Past Surgical History[4]   Family History[5]   Social History: Short Social Hx on File[6]     Objective:   Physical Exam  Vitals and nursing note reviewed.   Constitutional:       Appearance: He is well-developed.   HENT:      Head: Normocephalic and atraumatic.      Right Ear: External ear normal.      Left Ear: External ear normal.      Nose: Nose normal.   Eyes:      Conjunctiva/sclera: Conjunctivae normal.      Pupils: Pupils are equal, round, and reactive to light.   Cardiovascular:      Rate and Rhythm: Normal rate and regular rhythm.      Heart sounds: Normal heart sounds.   Pulmonary:      Effort: Pulmonary effort is normal.      Breath sounds: Normal breath sounds.   Abdominal:      General: Bowel sounds  are normal.      Palpations: Abdomen is soft.   Genitourinary:     Penis: Normal.       Prostate: Normal.      Rectum: Normal.   Musculoskeletal:         General: Swelling present. Normal range of motion.      Cervical back: Normal range of motion and neck supple.      Comments: Bl feet swelling/ankles swelling    Skin:     General: Skin is warm and dry.   Neurological:      Mental Status: He is alert and oriented to person, place, and time.      Deep Tendon Reflexes: Reflexes are normal and symmetric.         Assessment & Plan:   1. Annual physical exam exam as above will order labs   2. Type 2 diabetes mellitus with hyperglycemia, with long-term current use of insulin (HCC) continue current treatment follow-up with endocrine   3. Dyslipidemia continue current treatment we will retest   4. Primary hypertension well-controlled but will switch from amlodipine to losartan due to feet swelling   5. Bilateral leg edema possibly due to amlodipine was stopped amlodipine will let us know how he is doing watch diet watch salt intake   6. Coronary artery disease involving native coronary artery of native heart without angina pectoris medical management follows with cardiology   7. Cataract of both eyes, unspecified cataract type follow-up with eye specialist    8. Medication side effect as above will hold amlodipine       Orders Placed This Encounter   Procedures    CBC With Differential With Platelet    Comp Metabolic Panel (14)    Lipid Panel    TSH W Reflex To Free T4    Urinalysis, Routine    PSA Total, Screen    Hemoglobin A1C    Microalb/Creat Ratio, Random Urine       Meds This Visit:  Requested Prescriptions     Signed Prescriptions Disp Refills    losartan 50 MG Oral Tab 30 tablet 2     Sig: Take 1 tablet (50 mg total) by mouth daily.       Imaging & Referrals:  ENDOCRINOLOGY - INTERNAL            [1]   Current Outpatient Medications   Medication Sig Dispense Refill    losartan 50 MG Oral Tab Take 1 tablet (50 mg  total) by mouth daily. 30 tablet 2    levothyroxine 125 MCG Oral Tab Take 1 tablet (125 mcg total) by mouth before breakfast. 90 tablet 3    insulin glargine (LANTUS SOLOSTAR) 100 UNIT/ML Subcutaneous Solution Pen-injector Inject 60 Units into the skin every morning. 60 mL 3    Insulin Lispro, 1 Unit Dial, 100 UNIT/ML Subcutaneous Solution Pen-injector Inject 25 Units into the skin 3 (three) times daily. Inject in the morning, at noon, and at bedtime 75 mL 0    isosorbide mononitrate ER 30 MG Oral Tablet 24 Hr Take 1 tablet (30 mg total) by mouth daily. 90 tablet 0    ezetimibe 10 MG Oral Tab Take 1 tablet (10 mg total) by mouth nightly. 90 tablet 0    Continuous Glucose Sensor (FREESTYLE AJAY 3 SENSOR) Does not apply Misc Inject 1 each into the skin every 14 (fourteen) days. 6 each 3    Continuous Glucose  (FREESTYLE AJAY 2 READER) Does not apply Device Inject one each into the skin every fourteen days 6 each 3    clotrimazole 1 % External Solution Apply 1 Application topically 2 (two) times daily. 60 mL 3    aspirin 81 MG Oral Tab EC Take 1 tablet (81 mg total) by mouth daily. 90 tablet 3    rosuvastatin 20 MG Oral Tab Take 1 tablet (20 mg total) by mouth nightly. 90 tablet 3    Insulin Pen Needle (PEN NEEDLES) 32G X 4 MM Does not apply Misc Inject 1 Needle into the skin 4 (four) times daily. For use with insulins 400 each 3    furosemide 40 MG Oral Tab Take 1 tablet (40 mg total) by mouth 2 (two) times daily. 180 tablet 3    Empagliflozin (JARDIANCE) 25 MG Oral Tab Take 25 mg by mouth daily. 90 tablet 2   [2]   Allergies  Allergen Reactions    Amlodipine SWELLING    Shrimp Extract Allergy Skin Test ITCHING     shrimp allergenic extract    Shrimp OTHER (SEE COMMENTS)     Throat tightness   [3]   Past Medical History:   Atherosclerosis of coronary artery    Deaf, right    Diabetes (HCC)    Disorder of thyroid    Essential hypertension    High blood pressure    High cholesterol    Hyperlipidemia    Thyroid  disease   [4]   Past Surgical History:  Procedure Laterality Date    Appendectomy  2012    Coronary stent placement      Rotator cuff repair Left    [5]   Family History  Problem Relation Age of Onset    Colon Cancer Neg     Colon Polyps Neg    [6]   Social History  Socioeconomic History    Marital status:    Tobacco Use    Smoking status: Former     Passive exposure: Never    Smokeless tobacco: Never   Vaping Use    Vaping status: Never Used   Substance and Sexual Activity    Alcohol use: Not Currently    Drug use: Never     Social Drivers of Health     Food Insecurity: Patient Declined (2/23/2025)    Received from [facility]    Hunger Vital Sign     Worried About Running Out of Food in the Last Year: Patient declined     Ran Out of Food in the Last Year: Patient declined   Transportation Needs: Patient Declined (2/23/2025)    Received from [facility]    PRAPARE - Transportation     Lack of Transportation (Medical): Patient declined     Lack of Transportation (Non-Medical): Patient declined   Housing Stability: Unknown (2/23/2025)    Received from [facility]    Housing Stability Vital Sign     Unable to Pay for Housing in the Last Year: Patient declined

## 2025-05-19 DIAGNOSIS — Z79.4 TYPE 2 DIABETES MELLITUS WITH HYPERGLYCEMIA, WITH LONG-TERM CURRENT USE OF INSULIN (HCC): ICD-10-CM

## 2025-05-19 DIAGNOSIS — E11.65 TYPE 2 DIABETES MELLITUS WITH HYPERGLYCEMIA, WITH LONG-TERM CURRENT USE OF INSULIN (HCC): ICD-10-CM

## 2025-05-19 RX ORDER — GLIPIZIDE 10 MG/1
10 TABLET ORAL
Qty: 180 TABLET | Refills: 2 | Status: SHIPPED | OUTPATIENT
Start: 2025-05-19

## 2025-05-21 RX ORDER — INSULIN LISPRO 100 [IU]/ML
25 INJECTION, SOLUTION INTRAVENOUS; SUBCUTANEOUS 3 TIMES DAILY
Qty: 75 ML | Refills: 1 | Status: SHIPPED | OUTPATIENT
Start: 2025-05-21

## 2025-05-21 RX ORDER — ISOSORBIDE MONONITRATE 30 MG/1
30 TABLET, EXTENDED RELEASE ORAL DAILY
Qty: 90 TABLET | Refills: 3 | Status: SHIPPED | OUTPATIENT
Start: 2025-05-21

## 2025-05-21 RX ORDER — EZETIMIBE 10 MG/1
10 TABLET ORAL NIGHTLY
Qty: 90 TABLET | Refills: 3 | Status: SHIPPED | OUTPATIENT
Start: 2025-05-21

## 2025-05-23 ENCOUNTER — TELEPHONE (OUTPATIENT)
Dept: ENDOCRINOLOGY CLINIC | Facility: CLINIC | Age: 70
End: 2025-05-23

## 2025-05-23 NOTE — TELEPHONE ENCOUNTER
Medication Quantity Refills Start End   glipiZIDE 10 MG Oral Tab 180 tablet 2 5/19/2025 --   Sig:   Take 1 tablet (10 mg total) by mouth 2 (two) times daily before meals.     Route:   Oral         KEY: [id number]

## 2025-06-12 ENCOUNTER — OFFICE VISIT (OUTPATIENT)
Dept: PODIATRY CLINIC | Facility: CLINIC | Age: 70
End: 2025-06-12

## 2025-06-12 DIAGNOSIS — B35.1 ONYCHOMYCOSIS: ICD-10-CM

## 2025-06-12 DIAGNOSIS — E11.9 TYPE 2 DIABETES MELLITUS WITHOUT COMPLICATION, WITH LONG-TERM CURRENT USE OF INSULIN (HCC): Primary | ICD-10-CM

## 2025-06-12 DIAGNOSIS — Z79.4 TYPE 2 DIABETES MELLITUS WITHOUT COMPLICATION, WITH LONG-TERM CURRENT USE OF INSULIN (HCC): Primary | ICD-10-CM

## 2025-06-12 PROCEDURE — 99213 OFFICE O/P EST LOW 20 MIN: CPT | Performed by: STUDENT IN AN ORGANIZED HEALTH CARE EDUCATION/TRAINING PROGRAM

## 2025-06-12 NOTE — PROGRESS NOTES
[facility]  Progress Note      Samy Hoskins is a 70 year old male.   Chief Complaint   Patient presents with    Diabetic Foot Care     F/u Diabetic Foot Care. FBS 93 mg/dl this am.  On 1/28/25 A1C= 8.8 %  On 04/29/2025 LOV with PCP Kaleb Castillo MD          8    HPI:   Patient is a pleasant 8-year-old diabetic male presents to clinic for bilateral foot evaluation.  Admits to some numbness and tingling to lower extremity.  Denies any pedal injuries or trauma.  Denies any signs of infection.      Allergies: Amlodipine, Shrimp extract allergy skin test, and Shrimp    Current Outpatient Medications   Medication Sig Dispense Refill    isosorbide mononitrate ER 30 MG Oral Tablet 24 Hr Take 1 tablet (30 mg total) by mouth daily. 90 tablet 3    ezetimibe 10 MG Oral Tab Take 1 tablet (10 mg total) by mouth nightly. 90 tablet 3    Insulin Lispro, 1 Unit Dial, (HUMALOG KWIKPEN) 100 UNIT/ML Subcutaneous Solution Pen-injector Inject 25 Units into the skin 3 (three) times daily. Inject in the morning, at noon, and at bedtime 75 mL 1    glipiZIDE 10 MG Oral Tab Take 1 tablet (10 mg total) by mouth 2 (two) times daily before meals. 180 tablet 2    losartan 50 MG Oral Tab Take 1 tablet (50 mg total) by mouth daily. 30 tablet 2    levothyroxine 125 MCG Oral Tab Take 1 tablet (125 mcg total) by mouth before breakfast. 90 tablet 3    insulin glargine (LANTUS SOLOSTAR) 100 UNIT/ML Subcutaneous Solution Pen-injector Inject 60 Units into the skin every morning. 60 mL 3    Continuous Glucose Sensor (FREESTYLE AJAY 3 SENSOR) Does not apply Misc Inject 1 each into the skin every 14 (fourteen) days. 6 each 3    Continuous Glucose  (FREESTYLE AJAY 2 READER) Does not apply Device Inject one each into the skin every fourteen days 6 each 3    clotrimazole 1 % External Solution Apply 1 Application topically 2 (two) times daily. 60 mL 3    aspirin 81 MG Oral Tab EC Take 1 tablet (81 mg total) by mouth daily. 90 tablet 3     rosuvastatin 20 MG Oral Tab Take 1 tablet (20 mg total) by mouth nightly. 90 tablet 3    Insulin Pen Needle (PEN NEEDLES) 32G X 4 MM Does not apply Misc Inject 1 Needle into the skin 4 (four) times daily. For use with insulins 400 each 3    furosemide 40 MG Oral Tab Take 1 tablet (40 mg total) by mouth 2 (two) times daily. 180 tablet 3    Empagliflozin (JARDIANCE) 25 MG Oral Tab Take 25 mg by mouth daily. 90 tablet 2      Past Medical History:    Atherosclerosis of coronary artery    Deaf, right    Diabetes (HCC)    Disorder of thyroid    Essential hypertension    High blood pressure    High cholesterol    Hyperlipidemia    Thyroid disease      Past Surgical History:   Procedure Laterality Date    Appendectomy  2012    Coronary stent placement      Rotator cuff repair Left       Family History   Problem Relation Age of Onset    Colon Cancer Neg     Colon Polyps Neg       Social History     Socioeconomic History    Marital status:    Tobacco Use    Smoking status: Former     Passive exposure: Never    Smokeless tobacco: Never   Vaping Use    Vaping status: Never Used   Substance and Sexual Activity    Alcohol use: Not Currently    Drug use: Never           REVIEW OF SYSTEMS:     Denies nause, fever, chills  No calf pain  Denies chest pain or SOB      EXAM:   There were no vitals taken for this visit.  GENERAL: well developed, well nourished, in no apparent distress  EXTREMITIES:   1. Integument: Normal skin temperature and turgor. Toenails x 10 are slightly elongated and incuravated.   2. Vascular: Dorsalis pedis two out of four bilateral and posterior tibial pulses two out of   four bilateral, capillary refill normal.   3. Musculoskeletal: All muscle groups are graded 5 out of 5 in the foot and ankle.   4. Neurological: Normal sharp dull sensation; reflexes normal.             ASSESSMENT AND PLAN:   Diagnoses and all orders for this visit:    Type 2 diabetes mellitus without complication, with long-term  current use of insulin (HCC)    Onychomycosis          Plan:       -Patient examined, chart history reviewed.  -Discussed importance of proper pedal hygiene, regular foot checks, and tight glucose control.  -Sharply debrided nails x10 with a sterile nail nipper achieving a 20% reduction in thickness and length, without incident.   -Ambulate with supportive shoes and inserts and avoid walking barefoot.  -Educated patient on acute signs of infection advised patient to seek immediate medical attention if symptoms arise.    RTC in 3 months      The patient indicates understanding of these issues and agrees to the plan.        Marliou Gudino DPM

## 2025-06-26 ENCOUNTER — TELEPHONE (OUTPATIENT)
Dept: INTERNAL MEDICINE CLINIC | Facility: CLINIC | Age: 70
End: 2025-06-26

## 2025-06-26 DIAGNOSIS — Z79.4 TYPE 2 DIABETES MELLITUS WITH HYPERGLYCEMIA, WITH LONG-TERM CURRENT USE OF INSULIN (HCC): ICD-10-CM

## 2025-06-26 DIAGNOSIS — E11.65 TYPE 2 DIABETES MELLITUS WITH HYPERGLYCEMIA, WITH LONG-TERM CURRENT USE OF INSULIN (HCC): ICD-10-CM

## 2025-06-26 NOTE — TELEPHONE ENCOUNTER
To complete the PA for this pt:    1. Go to  [email or web address] and click \"Enter a Key\"  2. Enter the pts last name, , and key.   Key:[id number]     Pts last name: Gato    : 1955             Free style shahbaz 3 snesor  3. Complete the form and click \"Send to Plan\"    Please notify us when you receive the determination from the plan.

## 2025-06-26 NOTE — TELEPHONE ENCOUNTER
[facility] Freestyle Jessica [facility] [id number]

## 2025-07-10 ENCOUNTER — OFFICE VISIT (OUTPATIENT)
Dept: INTERNAL MEDICINE CLINIC | Facility: CLINIC | Age: 70
End: 2025-07-10

## 2025-07-10 VITALS
RESPIRATION RATE: 17 BRPM | HEIGHT: 71 IN | WEIGHT: 209 LBS | HEART RATE: 78 BPM | BODY MASS INDEX: 29.26 KG/M2 | DIASTOLIC BLOOD PRESSURE: 78 MMHG | SYSTOLIC BLOOD PRESSURE: 136 MMHG | OXYGEN SATURATION: 98 % | TEMPERATURE: 98 F

## 2025-07-10 DIAGNOSIS — G43.909 MIGRAINE WITHOUT STATUS MIGRAINOSUS, NOT INTRACTABLE, UNSPECIFIED MIGRAINE TYPE: Primary | ICD-10-CM

## 2025-07-10 PROCEDURE — 99214 OFFICE O/P EST MOD 30 MIN: CPT | Performed by: INTERNAL MEDICINE

## 2025-07-10 RX ORDER — SUMATRIPTAN SUCCINATE 100 MG/1
TABLET ORAL
Qty: 9 TABLET | Refills: 0 | Status: SHIPPED | OUTPATIENT
Start: 2025-07-10

## 2025-07-10 RX ORDER — TOPIRAMATE 25 MG/1
TABLET, FILM COATED ORAL
Qty: 147 TABLET | Refills: 0 | Status: SHIPPED | OUTPATIENT
Start: 2025-07-10

## 2025-07-10 NOTE — PROGRESS NOTES
Subjective:     Patient ID: Samy Hoskins is a 70 year old male.    HPI  patient has history of Migraines   was getting Botox tx (last tx was about 3yrs ago), but then insurance changed and wasn't able to go.   Sx started back up within the year, but in the past month headache/migraine are more constant and this past wkend the pain was too severe that he took 8-10 tylenols to help w/ the pain.        History/Other:   Review of Systems   Constitutional: Negative.    HENT: Negative.     Eyes: Negative.    Respiratory: Negative.     Cardiovascular: Negative.    Gastrointestinal: Negative.    Genitourinary: Negative.    Musculoskeletal: Negative.    Skin: Negative.    Neurological:  Positive for headaches.   Psychiatric/Behavioral: Negative.       Current Medications[1]  Allergies:Allergies[2]    Past Medical History[3]   Past Surgical History[4]   Family History[5]   Social History: Short Social Hx on File[6]     Objective:   Physical Exam  Vitals and nursing note reviewed.   Constitutional:       Appearance: He is well-developed.   HENT:      Head: Normocephalic and atraumatic.      Right Ear: External ear normal.      Left Ear: External ear normal.      Nose: Nose normal.   Eyes:      Conjunctiva/sclera: Conjunctivae normal.      Pupils: Pupils are equal, round, and reactive to light.   Cardiovascular:      Rate and Rhythm: Normal rate and regular rhythm.      Heart sounds: Normal heart sounds.   Pulmonary:      Effort: Pulmonary effort is normal.      Breath sounds: Normal breath sounds.   Abdominal:      General: Bowel sounds are normal.      Palpations: Abdomen is soft.   Genitourinary:     Penis: Normal.       Prostate: Normal.      Rectum: Normal.   Musculoskeletal:         General: Normal range of motion.      Cervical back: Normal range of motion and neck supple.   Skin:     General: Skin is warm and dry.   Neurological:      Mental Status: He is alert and oriented to person, place, and time.      Deep Tendon  Reflexes: Reflexes are normal and symmetric.         Assessment & Plan:   1. Migraine without status migrainosus, not intractable, unspecified migraine type will refer to neuro also will give Imitrex to take as needed will start on Topamax for prophylaxis       No orders of the defined types were placed in this encounter.      Meds This Visit:  Requested Prescriptions     Signed Prescriptions Disp Refills    SUMAtriptan Succinate (IMITREX) 100 MG Oral Tab 9 tablet 0     Sig: Take 1 tab at 1-st sign of migrain iof not better can repat 1 more time  after 2 hr min, but max dose is 200 mg in 24 hr    topiramate (TOPAMAX) 25 MG Oral Tab 147 tablet 0     Sig: Take 25 mg nightly for 1 week then switch to 25 twice a day for 1 week, third week take 25 mg in the morning 50 mg in the evening and then fourth will switch to 50 mg twice a day and they are on continue the same       Imaging & Referrals:  NEURO - INTERNAL            [1]   Current Outpatient Medications   Medication Sig Dispense Refill    SUMAtriptan Succinate (IMITREX) 100 MG Oral Tab Take 1 tab at 1-st sign of migrain iof not better can repat 1 more time  after 2 hr min, but max dose is 200 mg in 24 hr 9 tablet 0    topiramate (TOPAMAX) 25 MG Oral Tab Take 25 mg nightly for 1 week then switch to 25 twice a day for 1 week, third week take 25 mg in the morning 50 mg in the evening and then fourth will switch to 50 mg twice a day and they are on continue the same 147 tablet 0    isosorbide mononitrate ER 30 MG Oral Tablet 24 Hr Take 1 tablet (30 mg total) by mouth daily. 90 tablet 3    ezetimibe 10 MG Oral Tab Take 1 tablet (10 mg total) by mouth nightly. 90 tablet 3    Insulin Lispro, 1 Unit Dial, (HUMALOG KWIKPEN) 100 UNIT/ML Subcutaneous Solution Pen-injector Inject 25 Units into the skin 3 (three) times daily. Inject in the morning, at noon, and at bedtime 75 mL 1    glipiZIDE 10 MG Oral Tab Take 1 tablet (10 mg total) by mouth 2 (two) times daily before meals.  180 tablet 2    losartan 50 MG Oral Tab Take 1 tablet (50 mg total) by mouth daily. 30 tablet 2    levothyroxine 125 MCG Oral Tab Take 1 tablet (125 mcg total) by mouth before breakfast. 90 tablet 3    insulin glargine (LANTUS SOLOSTAR) 100 UNIT/ML Subcutaneous Solution Pen-injector Inject 60 Units into the skin every morning. 60 mL 3    Continuous Glucose Sensor (FREESTYLE AJAY 3 SENSOR) Does not apply Misc Inject 1 each into the skin every 14 (fourteen) days. 6 each 3    Continuous Glucose  (FREESTYLE AJAY 2 READER) Does not apply Device Inject one each into the skin every fourteen days 6 each 3    clotrimazole 1 % External Solution Apply 1 Application topically 2 (two) times daily. 60 mL 3    aspirin 81 MG Oral Tab EC Take 1 tablet (81 mg total) by mouth daily. 90 tablet 3    rosuvastatin 20 MG Oral Tab Take 1 tablet (20 mg total) by mouth nightly. 90 tablet 3    Insulin Pen Needle (PEN NEEDLES) 32G X 4 MM Does not apply Misc Inject 1 Needle into the skin 4 (four) times daily. For use with insulins 400 each 3    furosemide 40 MG Oral Tab Take 1 tablet (40 mg total) by mouth 2 (two) times daily. 180 tablet 3    Empagliflozin (JARDIANCE) 25 MG Oral Tab Take 25 mg by mouth daily. 90 tablet 2   [2]   Allergies  Allergen Reactions    Amlodipine SWELLING    Shrimp Extract Allergy Skin Test ITCHING     shrimp allergenic extract    Shrimp OTHER (SEE COMMENTS)     Throat tightness   [3]   Past Medical History:   Atherosclerosis of coronary artery    Deaf, right    Diabetes (HCC)    Disorder of thyroid    Essential hypertension    High blood pressure    High cholesterol    Hyperlipidemia    Thyroid disease   [4]   Past Surgical History:  Procedure Laterality Date    Appendectomy  2012    Coronary stent placement      Rotator cuff repair Left    [5]   Family History  Problem Relation Age of Onset    Colon Cancer Neg     Colon Polyps Neg    [6]   Social History  Socioeconomic History    Marital status:     Tobacco Use    Smoking status: Former     Passive exposure: Never    Smokeless tobacco: Never   Vaping Use    Vaping status: Never Used   Substance and Sexual Activity    Alcohol use: Not Currently    Drug use: Never     Social Drivers of Health     Food Insecurity: Patient Declined (2/23/2025)    Received from [facility]    Hunger Vital Sign     Worried About Running Out of Food in the Last Year: Patient declined     Ran Out of Food in the Last Year: Patient declined   Transportation Needs: Patient Declined (2/23/2025)    Received from [facility]    PRAPARE - Transportation     Lack of Transportation (Medical): Patient declined     Lack of Transportation (Non-Medical): Patient declined   Housing Stability: Unknown (2/23/2025)    Received from [facility]    Housing Stability Vital Sign     Unable to Pay for Housing in the Last Year: Patient declined

## 2025-07-16 DIAGNOSIS — Z79.4 TYPE 2 DIABETES MELLITUS WITH HYPERGLYCEMIA, WITH LONG-TERM CURRENT USE OF INSULIN (HCC): Primary | ICD-10-CM

## 2025-07-16 DIAGNOSIS — E11.65 TYPE 2 DIABETES MELLITUS WITH HYPERGLYCEMIA, WITH LONG-TERM CURRENT USE OF INSULIN (HCC): Primary | ICD-10-CM

## 2025-07-16 RX ORDER — BLOOD-GLUCOSE METER
1 EACH MISCELLANEOUS
Qty: 1 KIT | Refills: 0 | Status: SHIPPED | OUTPATIENT
Start: 2025-07-16

## 2025-07-16 RX ORDER — BLOOD SUGAR DIAGNOSTIC
1 STRIP MISCELLANEOUS
Qty: 400 STRIP | Refills: 3 | Status: SHIPPED | OUTPATIENT
Start: 2025-07-16

## 2025-07-16 RX ORDER — AVOBENZONE, HOMOSALATE, OCTISALATE, OCTOCRYLENE 30; 40; 45; 26 MG/ML; MG/ML; MG/ML; MG/ML
CREAM TOPICAL
Qty: 400 EACH | Refills: 3 | Status: SHIPPED | OUTPATIENT
Start: 2025-07-16

## 2025-07-16 NOTE — TELEPHONE ENCOUNTER
Patient states that patient is waiting for prior authorization for Freestyle Jessica. In the mean time he is out of test strips for his glucose monitor. Patient uses the Freestyle Premium monitor. Unsure which strips are needed.     Spoke to pharmacy. The monitor that patient uses is outdated. She is recommending sending a whole new prescription for monitor and testing supplies. She ran patient's insurance and the monitor and strips covered are [employer]. Please see new prescription to review and approve.

## 2025-07-17 ENCOUNTER — TELEPHONE (OUTPATIENT)
Dept: INTERNAL MEDICINE CLINIC | Facility: CLINIC | Age: 70
End: 2025-07-17

## 2025-07-17 NOTE — TELEPHONE ENCOUNTER
Called the patient after receiving a request for a PA on a freestyle shahbaz meter and sensor.   I left a VM for him to make an appointment with Dr. Umana who he has seen before so they can put the PA through for his supplies through medicaid.

## 2025-07-17 NOTE — TELEPHONE ENCOUNTER
Spoke with patient's wife per LONNY, Date of Birth verified  She stated she has the letter from the insurance, she will send copy to us.   She was provided with IT team tel # for further assistance with [facility].       SAVI

## 2025-07-22 RX ORDER — ACYCLOVIR 800 MG/1
1 TABLET ORAL
Qty: 6 EACH | Refills: 3 | OUTPATIENT
Start: 2025-07-22

## 2025-07-22 NOTE — TELEPHONE ENCOUNTER
Disp Refills Start End    Continuous Glucose Sensor (FREESTYLE AJAY 3 SENSOR) Does not apply Misc 6 each 3 1/28/2025 --    Sig - Route: Inject 1 each into the skin every 14 (fourteen) days. - Subcutaneous    Sent to pharmacy as: FreeStyle Ajay 3 Sensor    Notes to Pharmacy: DX Code: E11.65, Z79.4   Insulin dependent. Please dispense appropriate supplies as covered by patient's insurance    E-Prescribing Status: Receipt confirmed by pharmacy (1/28/2025  2:02 PM CST)      Pharmacy    [id number] - [place], [place] - [address] [phone], [phone]

## (undated) DEVICE — GIJAW SINGLE-USE BIOPSY FORCEPS WITH NEEDLE: Brand: GIJAW

## (undated) DEVICE — Device

## (undated) DEVICE — KIT CLEAN ENDOKIT 1.1OZ GOWNX2

## (undated) DEVICE — YANKAUER,BULB TIP,W/O VENT,RIGID,STERILE: Brand: MEDLINE

## (undated) DEVICE — MEDI-VAC NON-CONDUCTIVE SUCTION TUBING 6MM X 1.8M (6FT.) L: Brand: CARDINAL HEALTH

## (undated) DEVICE — CONMED SCOPE SAVER BITE BLOCK, 20X27 MM: Brand: SCOPE SAVER

## (undated) DEVICE — CO2 CANNULA,SSOFT,ADLT,7O2,4CO2,FEMALE: Brand: MEDLINE

## (undated) DEVICE — LAWSON - GOWN SURG STD XL STL DISP

## (undated) DEVICE — MEDI-VAC NON-CONDUCTIVE SUCTION TUBING: Brand: CARDINAL HEALTH

## (undated) DEVICE — KIT ENDO ORCAPOD 160/180/190

## (undated) DEVICE — V2 SPECIMEN COLLECTION MANIFOLD KIT: Brand: NEPTUNE

## (undated) DEVICE — SYRINGE, LUER SLIP, STERILE, 60ML: Brand: MEDLINE

## (undated) NOTE — LETTER
AUTHORIZATION FOR SURGICAL OPERATION OR OTHER PROCEDURE    1. I hereby authorize Dr. Peter Arteaga and the [facility] staff assigned to my case to perform the following operation and/or procedure at the [facility]:    Bilateral greater trochanteric bursa injections, ultrasound guidance, local anesthesia     2.  My physician has explained the nature and purpose of the operation or other procedure, possible alternative methods of treatment, the risks involved, and the possibility of complication to me.  I acknowledge that no guarantee has been made as to the result that may be obtained.  3.  I recognize that, during the course of this operation, or other procedure, unforseen conditions may necessitate additional or different procedure than those listed above.  I, therefore, further authorize and request that the above named physician, his/her physician assistants or designees perform such procedures as are, in his/her professional opinion, necessary and desirable.  4.  Any tissue or organs removed in the operation or other procedure may be disposed of by and at the discretion of the [facility] staff and [facility].  5.  I understand that in the event of a medical emergency, I will be transported by local paramedics to [facility] or other hospital emergency department.  6.  I certify that I have read and fully understand the above consent to operation and/or other procedure.    7.  I acknowledge that my physician has explained sedation/analgesia administration to me including the risks and benefits.  I consent to the administration of sedation/analgesia as may be necessary or desirable in the judgement of my physician.    Witness signature: ___________________________________________________ Date:  ______/______/_____                    Time:  ________ A.M.  P.M.       Patient Name:  Samy Hoskins  4/4/1955  [id number]       Patient signature:   ___________________________________________________                 Statement of Physician  My signature below affirms that prior to the time of the procedure, I have explained to the patient and/or his/her guardian, the risks and benefits involved in the proposed treatment and any reasonable alternative to the proposed treatment.  I have also explained the risks and benefits involved in the refusal of the proposed treatment and have answered the patient's questions.                        Date:  ______/______/_______  Provider                      Signature:  __________________________________________________________       Time:  ___________ ATAMMI CHOWDHURY

## (undated) NOTE — ED AVS SNAPSHOT
Reji Jensen   MRN: [id number]    Department:  [facility] Emergency Department   Date of Visit:  10/13/2018           Disclosure     Insurance plans vary and the physician(s) referred by the ER may not be covered by your plan.  Please contact yo CARE PHYSICIAN AT ONCE OR RETURN IMMEDIATELY TO THE EMERGENCY DEPARTMENT. If you have been prescribed any medication(s), please fill your prescription right away and begin taking the medication(s) as directed.   If you believe that any of the medications

## (undated) NOTE — LETTER
12/16/2024    Samy Hoskins  [address] [facility] [address]         Dear Samy,    This letter is to inform you that our office has made several attempts to reach you by phone without success.  We were attempting to contact you by phone regarding radiology report.    Please contact our office at the number listed below as soon as you receive this letter to discuss this issue and to make the necessary changes in our system to your contact information.  Thank you for your cooperation.        Sincerely,    Kaleb Marie MD  [address] [facility] [address]  Ph: [phone]  Fax: [phone]         Document electronically generated by:  Nataliia SALES RN

## (undated) NOTE — Clinical Note
TCM call completed. A TCM-HFU appointment is scheduled on 1/12/2024. A TE was sent to the office with home glucose readings update. Thank you.

## (undated) NOTE — Clinical Note
WILLIAM Notifier: [employer]. Patient Name: Samy Hoskins Identification Number: [id number]                          Advance Beneficiary Notice of Noncoverage (ABN)   NOTE:  If Medicare doesn’t pay for D. item/service(s) below, you may have to pay.  Medicare does not pay for everything, even some care that you or your health care provider have good reason to think you need. We expect Medicare may not pay for the D. item/service(s) below.  D. Items or Services E. Reason Medicare May Not Pay: F. Estimated Cost   __ EKG ($87.00)  __ Pap smear ($101) __Pelvic/Breast ($147.00)  __ Ear Irrigation ($138)  __ Injection(s)  ___ Tdap ($181)       ___ Meningitis ($290)   __Prevnar ($555)  ___ Td ($66)              ___ Prevnar 20 ($549)  ___ Hep A ($152)     ___ Prolia ($1827)         __ Xiaflex ($            )   ___ Hep B ($150)     ___ Pneumovax ($287)                                         ___ Vaccine Administration ($65)   __ Medicare does not cover this service      __ Medicare may not pay for this   item/service for your condition     __ Medicare may not pay for this item/service as often as this        WHAT YOU NEED TO DO NOW:  Read this notice, so you can make an informed decision about your care.  Ask us any questions that you may have after you finish reading.  Choose an option below about whether to receive the D. item/service(s) listed above.  Note: If you choose Option 1 or 2, we may help you to use any other insurance that you might have, but Medicare cannot require us to do this.  G. OPTIONS: Check only one box.  We cannot choose a box for you.   OPTION 1. I want the D. item/service(s) listed above. You may ask to be paid now, but I also want Medicare billed for an official decision on payment, which is sent to me on a Medicare Summary Notice (MSN). I understand that if Medicare doesn’t pay, I am responsible for payment, but I can appeal to Medicare by following the directions on the MSN. If  Medicare does pay, you will refund any payments I made to you, less co-pays or deductibles.  OPTION 2. I want the D. item/service(s) listed above, but do not bill Medicare. You may ask to be paid now as I am responsible for payment. I cannot appeal if Medicare is not billed.  OPTION 3. I don't want the D. item/service(s) listed above. I understand with this choice I am not responsible for payment, and I cannot appeal to see if Medicare would pay.    H. Additional Information:    This notice gives our opinion, not an official Medicare decision. If you have other questions on this notice or Medicare billing, call 1-800-MEDICARE (1-864.571.9078/TTY: 1-843.756.2909). Signing below means that you have received and understand this notice. You also receive a copy.  I. Signature: J. Date:       You have the right to get Medicare information in an accessible format, like large print, Braille, or audio. You also have the right to file a complaint if you feel you’ve been discriminated against. Visit Medicare.gov/about- us/wronmgprtwbru-ydwpvukhlyawilbdg-jkujxn.  According to the Paperwork Reduction Act of 1995, no persons are required to respond to a collection of information unless it displays a valid OMB control number. The valid OMB control number for this information collection is 5202-6954. The time required to complete this information collection is estimated to average 7 minutes per response, including the time to review instructions, search existing data resources, gather the data needed, and complete and review the information collection. If you have comments concerning the accuracy of the time estimate or suggestions for improving this form, please write to: CMS, [facility] [employer]     [place], Attn: ОЛЕГ Reports Clearance Officer, [place], [place] [address].  Form CMS-R-131 (Exp. 1/31/2026) Form Approved OMB No. [id number]

## (undated) NOTE — LETTER
[facility]  Administration of Anesthesia  Samy GRACE agree to be cared for by a physician anesthesiologist alone and/or with a nurse anesthetist, who is specially trained to monitor me and give me medicine to put me to sleep or keep me comfortable during my procedure    I understand that my anesthesiologist and/or anesthetist is not an employee or agent of [facility] or [employer]. He or she works for [facility]    As the patient asking for anesthesia services, I agree to:  Allow the anesthesiologist (anesthesia doctor) to give me medicine and do additional procedures as necessary. Some examples are: Starting or using an “IV” to give me medicine, fluids or blood during my procedure, and having a breathing tube placed to help me breathe when I’m asleep (intubation). In the event that my heart stops working properly, I understand that my anesthesiologist will make every effort to sustain my life, unless otherwise directed by [facility] Do Not Resuscitate documents.  Tell my anesthesia doctor before my procedure:  If I am pregnant.  The last time that I ate or drank.  iii. All of the medicines I take (including prescriptions, herbal supplements, and pills I can buy without a prescription (including street drugs/illegal medications). Failure to inform my anesthesiologist about these medicines may increase my risk of anesthetic complications.  iv.If I am allergic to anything or have had a reaction to anesthesia before.  I understand how the anesthesia medicine will help me (benefits).  I understand that with any type of anesthesia medicine there are risks:  The most common risks are: nausea, vomiting, sore throat, muscle soreness, damage to my eyes, mouth, or teeth (from breathing tube placement).  Rare risks include: remembering what happened during my procedure, allergic reactions to medications, injury to my airway, heart, lungs, vision, nerves, or muscles  and in extremely rare instances death.  My doctor has explained to me other choices available to me for my care (alternatives).  Pregnant Patients (“epidural”):  I understand that the risks of having an epidural (medicine given into my back to help control pain during labor), include itching, low blood pressure, difficulty urinating, headache or slowing of the baby’s heart. Very rare risks include infection, bleeding, seizure, irregular heart rhythms and nerve injury.  Regional Anesthesia (“spinal”, “epidural”, & “nerve blocks”):  I understand that rare but potential complications include headache, bleeding, infection, seizure, irregular heart rhythms, and nerve injury.    _____________________________________________________________________________  Patient (or Representative) Signature/Relationship to Patient  Date   Time    _____________________________________________________________________________   Name (if used)    Language/Organization   Time    _____________________________________________________________________________  Nurse Anesthetist Signature     Date   Time  _____________________________________________________________________________  Anesthesiologist Signature     Date   Time  I have discussed the procedure and information above with the patient (or patient’s representative) and answered their questions. The patient or their representative has agreed to have anesthesia services.    _____________________________________________________________________________  Witness        Date   Time  I have verified that the signature is that of the patient or patient’s representative, and that it was signed before the procedure  Patient Name: Samy Hoskins     : 1955                 Printed: 10/15/2024 at 5:41 PM    Medical Record #: [id number]                                            Page 1 of 1  ----------ANESTHESIA CONSENT----------

## (undated) NOTE — LETTER
01/15/25        Samy Hoskins  [address] [facility] [address]      Dear Samy,    Our records indicate that you have outstanding lab work and or testing that was ordered for you and has not yet been completed:      Calprotectin, Fecal (Order #[id number]) on 10/19/24     To provide you with the best possible care, please complete these orders at your earliest convenience. If you have recently completed these orders please disregard this letter.     If you have any questions please call the office at No information on file..     Thank you,       Not in an encounter context.

## (undated) NOTE — LETTER
Date: 2025      Patient Name: Samy Hoskins      : 1955        Thank you for choosing [facility] as your health care provider. Your physician has deemed the following medical service(s) necessary. However, your insurance plan may not pay for all of your health care and costs and may deny payment for this service. The fact that your insurance plan does not pay for an item or service does not mean you should not receive it. The purpose of this form is to help you make an informed decision about whether or not you want to receive this service(s) that may not be paid for by your insurance plan.    CPT Code Description     Cost     Bilateral greater trochanteric bursa injections, ultrasound guidance, local anesthesia     _________ ______________________________ _____________      _________ ______________________________ _____________      I understand that the above mentioned service(s) or supply may not be covered by my insurance company. I agree to be financially responsible for the cost of this service or supply in the event of my insurance denies payment as a non-covered benefit.        ______________________________________________________________________  Signature of Patient or Patient's Representative  Relationship  Date    ______________________________________________________________________  Signature of Witness to signing of form   Printed Name

## (undated) NOTE — LETTER
[facility]  [address], [place], [place]    Authorization for Surgical Operation and Procedure                               I hereby authorize Shaun Malave MD, my physician and his/her assistants (if applicable), which may include medical students, residents, and/or fellows, to perform the following surgical operation/ procedure and administer such anesthesia as may be determined necessary by my physician: Operation/Procedure name (s) ESOPHAGOGASTRODUODENOSCOPY on Samy Hoskins   2.   I recognize that during the surgical operation/procedure, unforeseen conditions may necessitate additional or different procedures than those listed above.  I, therefore, further authorize and request that the above-named surgeon, assistants, or designees perform such procedures as are, in their judgment, necessary and desirable.    3.   My surgeon/physician has discussed prior to my surgery the potential benefits, risks and side effects of this procedure; the likelihood of achieving goals; and potential problems that might occur during recuperation.  They also discussed reasonable alternatives to the procedure, including risks, benefits, and side effects related to the alternatives and risks related to not receiving this procedure.  I have had all my questions answered and I acknowledge that no guarantee has been made as to the result that may be obtained.    4.   Should the need arise during my operation/procedure, which includes change of level of care prior to discharge, I also consent to the administration of blood and/or blood products.  Further, I understand that despite careful testing and screening of blood or blood products by collecting agencies, I may still be subject to ill effects as a result of receiving a blood transfusion and/or blood products.  The following are some, but not all, of the potential risks that can occur: fever and allergic reactions, hemolytic reactions, transmission of  diseases such as Hepatitis, AIDS and Cytomegalovirus (CMV) and fluid overload.  In the event that I wish to have an autologous transfusion of my own blood, or a directed donor transfusion, I will discuss this with my physician.  Check only if Refusing Blood or Blood Products  I understand refusal of blood or blood products as deemed necessary by my physician may have serious consequences to my condition to include possible death. I hereby assume responsibility for my refusal and release the hospital, its personnel, and my physicians from any responsibility for the consequences of my refusal.    o  Refuse   5.   I authorize the use of any specimen, organs, tissues, body parts or foreign objects that may be removed from my body during the operation/procedure for diagnosis, research or teaching purposes and their subsequent disposal by hospital authorities.  I also authorize the release of specimen test results and/or written reports to my treating physician on the hospital medical staff or other referring or consulting physicians involved in my care, at the discretion of the Pathologist or my treating physician.    6.   I consent to the photographing or videotaping of the operations or procedures to be performed, including appropriate portions of my body for medical, scientific, or educational purposes, provided my identity is not revealed by the pictures or by descriptive texts accompanying them.  If the procedure has been photographed/videotaped, the surgeon will obtain the original picture, image, videotape or CD.  The hospital will not be responsible for storage, release or maintenance of the picture, image, tape or CD.    7.   I consent to the presence of a  or observers in the operating room as deemed necessary by my physician or their designees.    8.   I recognize that in the event my procedure results in extended X-Ray/fluoroscopy time, I may develop a skin reaction.    9. If I have a Do Not  Attempt Resuscitation (DNAR) order in place, that status will be suspended while in the operating room, procedural suite, and during the recovery period unless otherwise explicitly stated by me (or a person authorized to consent on my behalf). The surgeon or my attending physician will determine when the applicable recovery period ends for purposes of reinstating the DNAR order.  10. Patients having a sterilization procedure: I understand that if the procedure is successful the results will be permanent and it will therefore be impossible for me to inseminate, conceive, or bear children.  I also understand that the procedure is intended to result in sterility, although the result has not been guaranteed.   11. I acknowledge that my physician has explained sedation/analgesia administration to me including the risk and benefits I consent to the administration of sedation/analgesia as may be necessary or desirable in the judgment of my physician.    I CERTIFY THAT I HAVE READ AND FULLY UNDERSTAND THE ABOVE CONSENT TO OPERATION and/or OTHER PROCEDURE.     ____________________________________  _________________________________        ______________________________  Signature of Patient    Signature of Responsible Person                Printed Name of Responsible Person                                      ____________________________________  _____________________________                ________________________________  Signature of Witness        Date  Time         Relationship to Patient    STATEMENT OF PHYSICIAN My signature below affirms that prior to the time of the procedure; I have explained to the patient and/or his/her legal representative, the risks and benefits involved in the proposed treatment and any reasonable alternative to the proposed treatment. I have also explained the risks and benefits involved in refusal of the proposed treatment and alternatives to the proposed treatment and have answered the  patient's questions. If I have a significant financial interest in a co-management agreement or a significant financial interest in any product or implant, or other significant relationship used in this procedure/surgery, I have disclosed this and had a discussion with my patient.     _____________________________________________________              _____________________________  (Signature of Physician)                                                                                         (Date)                                   (Time)  Patient Name: Samy Hoskins      : 1955      Printed: 10/15/2024     Medical Record #: [id number]                                      Page 1 of 1